# Patient Record
Sex: FEMALE | Race: BLACK OR AFRICAN AMERICAN | NOT HISPANIC OR LATINO | URBAN - METROPOLITAN AREA
[De-identification: names, ages, dates, MRNs, and addresses within clinical notes are randomized per-mention and may not be internally consistent; named-entity substitution may affect disease eponyms.]

---

## 2017-10-05 ENCOUNTER — OUTPATIENT (OUTPATIENT)
Dept: OUTPATIENT SERVICES | Facility: HOSPITAL | Age: 22
LOS: 1 days | Discharge: HOME | End: 2017-10-05
Payer: MEDICAID

## 2017-10-05 DIAGNOSIS — F20.9 SCHIZOPHRENIA, UNSPECIFIED: ICD-10-CM

## 2019-04-29 ENCOUNTER — EMERGENCY (EMERGENCY)
Facility: HOSPITAL | Age: 24
LOS: 1 days | Discharge: HOME | End: 2019-04-29
Admitting: EMERGENCY MEDICINE
Payer: MEDICAID

## 2019-04-29 VITALS
DIASTOLIC BLOOD PRESSURE: 68 MMHG | HEART RATE: 107 BPM | TEMPERATURE: 98 F | SYSTOLIC BLOOD PRESSURE: 126 MMHG | RESPIRATION RATE: 16 BRPM | OXYGEN SATURATION: 100 %

## 2019-04-29 PROCEDURE — 73562 X-RAY EXAM OF KNEE 3: CPT | Mod: 26,RT

## 2019-04-29 PROCEDURE — 99283 EMERGENCY DEPT VISIT LOW MDM: CPT

## 2019-04-29 RX ORDER — IBUPROFEN 200 MG
600 TABLET ORAL ONCE
Qty: 0 | Refills: 0 | Status: COMPLETED | OUTPATIENT
Start: 2019-04-29 | End: 2019-04-29

## 2019-04-29 RX ADMIN — Medication 600 MILLIGRAM(S): at 18:28

## 2019-04-29 NOTE — ED PROVIDER NOTE - NSFOLLOWUPINSTRUCTIONS_ED_ALL_ED_FT
Take motrin 600mg (3 advil) every 8 hours as needed for pain.  Avoid any strenuous activity,  Follow up with an orthopedic within 1-2 weeks.  Return to ED for any worsening pain, swelling, redness or fever.

## 2019-04-29 NOTE — ED PROVIDER NOTE - CLINICAL SUMMARY MEDICAL DECISION MAKING FREE TEXT BOX
25 yo female c acute onset of right knee pain after hearing a "pop" sound.  likely ligamentous/meniscal injury. will get xrays, nsaids, ortho follow up

## 2019-04-29 NOTE — ED PROVIDER NOTE - MUSCULOSKELETAL MINIMAL EXAM
right knee: TTP medial and lateral joint lines, FROM, no lig instability, no swelling, erythema or ecchymosis noted

## 2019-04-29 NOTE — ED ADULT TRIAGE NOTE - CHIEF COMPLAINT QUOTE
pt comes to ED for knee pain pt was walking and she felt a pop in her knee. pt states this happened before. pt was supposed to use a brace. pt VSS pt appears comfortable NAD

## 2019-05-20 ENCOUNTER — INPATIENT (INPATIENT)
Facility: HOSPITAL | Age: 24
LOS: 0 days | Discharge: HOME | DRG: 563 | End: 2019-05-21
Attending: STUDENT IN AN ORGANIZED HEALTH CARE EDUCATION/TRAINING PROGRAM | Admitting: HOSPITALIST
Payer: MEDICAID

## 2019-05-20 ENCOUNTER — APPOINTMENT (OUTPATIENT)
Dept: ORTHOPEDIC SURGERY | Facility: CLINIC | Age: 24
End: 2019-05-20

## 2019-05-20 VITALS
OXYGEN SATURATION: 96 % | HEART RATE: 114 BPM | DIASTOLIC BLOOD PRESSURE: 69 MMHG | WEIGHT: 274.92 LBS | SYSTOLIC BLOOD PRESSURE: 120 MMHG | HEIGHT: 68 IN | RESPIRATION RATE: 16 BRPM

## 2019-05-20 DIAGNOSIS — Z29.9 ENCOUNTER FOR PROPHYLACTIC MEASURES, UNSPECIFIED: ICD-10-CM

## 2019-05-20 DIAGNOSIS — R10.13 EPIGASTRIC PAIN: ICD-10-CM

## 2019-05-20 DIAGNOSIS — R09.89 OTHER SPECIFIED SYMPTOMS AND SIGNS INVOLVING THE CIRCULATORY AND RESPIRATORY SYSTEMS: ICD-10-CM

## 2019-05-20 DIAGNOSIS — F20.9 SCHIZOPHRENIA, UNSPECIFIED: ICD-10-CM

## 2019-05-20 DIAGNOSIS — M25.561 PAIN IN RIGHT KNEE: ICD-10-CM

## 2019-05-20 DIAGNOSIS — R26.2 DIFFICULTY IN WALKING, NOT ELSEWHERE CLASSIFIED: ICD-10-CM

## 2019-05-20 LAB
ALBUMIN SERPL ELPH-MCNC: 4.2 G/DL — SIGNIFICANT CHANGE UP (ref 3.3–5)
ALP SERPL-CCNC: 119 U/L — SIGNIFICANT CHANGE UP (ref 40–120)
ALT FLD-CCNC: 28 U/L — SIGNIFICANT CHANGE UP (ref 10–45)
ANION GAP SERPL CALC-SCNC: 15 MMOL/L — SIGNIFICANT CHANGE UP (ref 5–17)
AST SERPL-CCNC: 17 U/L — SIGNIFICANT CHANGE UP (ref 10–40)
BASOPHILS # BLD AUTO: 0 K/UL — SIGNIFICANT CHANGE UP (ref 0–0.2)
BASOPHILS NFR BLD AUTO: 0.4 % — SIGNIFICANT CHANGE UP (ref 0–2)
BILIRUB SERPL-MCNC: 0.4 MG/DL — SIGNIFICANT CHANGE UP (ref 0.2–1.2)
BUN SERPL-MCNC: 11 MG/DL — SIGNIFICANT CHANGE UP (ref 7–23)
CALCIUM SERPL-MCNC: 9.7 MG/DL — SIGNIFICANT CHANGE UP (ref 8.4–10.5)
CHLORIDE SERPL-SCNC: 103 MMOL/L — SIGNIFICANT CHANGE UP (ref 96–108)
CO2 SERPL-SCNC: 21 MMOL/L — LOW (ref 22–31)
CREAT SERPL-MCNC: 0.75 MG/DL — SIGNIFICANT CHANGE UP (ref 0.5–1.3)
EOSINOPHIL # BLD AUTO: 0.1 K/UL — SIGNIFICANT CHANGE UP (ref 0–0.5)
EOSINOPHIL NFR BLD AUTO: 1.1 % — SIGNIFICANT CHANGE UP (ref 0–6)
GAS PNL BLDV: SIGNIFICANT CHANGE UP
GLUCOSE SERPL-MCNC: 111 MG/DL — HIGH (ref 70–99)
HCG SERPL-ACNC: <2 MIU/ML — SIGNIFICANT CHANGE UP
HCT VFR BLD CALC: 42.1 % — SIGNIFICANT CHANGE UP (ref 34.5–45)
HGB BLD-MCNC: 13.4 G/DL — SIGNIFICANT CHANGE UP (ref 11.5–15.5)
LYMPHOCYTES # BLD AUTO: 24.5 % — SIGNIFICANT CHANGE UP (ref 13–44)
LYMPHOCYTES # BLD AUTO: 3.1 K/UL — SIGNIFICANT CHANGE UP (ref 1–3.3)
MCHC RBC-ENTMCNC: 30.1 PG — SIGNIFICANT CHANGE UP (ref 27–34)
MCHC RBC-ENTMCNC: 31.7 GM/DL — LOW (ref 32–36)
MCV RBC AUTO: 94.9 FL — SIGNIFICANT CHANGE UP (ref 80–100)
MONOCYTES # BLD AUTO: 0.9 K/UL — SIGNIFICANT CHANGE UP (ref 0–0.9)
MONOCYTES NFR BLD AUTO: 6.7 % — SIGNIFICANT CHANGE UP (ref 2–14)
NEUTROPHILS # BLD AUTO: 8.6 K/UL — HIGH (ref 1.8–7.4)
NEUTROPHILS NFR BLD AUTO: 67.3 % — SIGNIFICANT CHANGE UP (ref 43–77)
PLATELET # BLD AUTO: 433 K/UL — HIGH (ref 150–400)
POTASSIUM SERPL-MCNC: 3.7 MMOL/L — SIGNIFICANT CHANGE UP (ref 3.5–5.3)
POTASSIUM SERPL-SCNC: 3.7 MMOL/L — SIGNIFICANT CHANGE UP (ref 3.5–5.3)
PROT SERPL-MCNC: 8.3 G/DL — SIGNIFICANT CHANGE UP (ref 6–8.3)
RBC # BLD: 4.44 M/UL — SIGNIFICANT CHANGE UP (ref 3.8–5.2)
RBC # FLD: 13.4 % — SIGNIFICANT CHANGE UP (ref 10.3–14.5)
SODIUM SERPL-SCNC: 139 MMOL/L — SIGNIFICANT CHANGE UP (ref 135–145)
WBC # BLD: 12.7 K/UL — HIGH (ref 3.8–10.5)
WBC # FLD AUTO: 12.7 K/UL — HIGH (ref 3.8–10.5)

## 2019-05-20 PROCEDURE — 99223 1ST HOSP IP/OBS HIGH 75: CPT

## 2019-05-20 PROCEDURE — 73562 X-RAY EXAM OF KNEE 3: CPT | Mod: 26,RT

## 2019-05-20 PROCEDURE — 99285 EMERGENCY DEPT VISIT HI MDM: CPT

## 2019-05-20 PROCEDURE — 93971 EXTREMITY STUDY: CPT | Mod: 26

## 2019-05-20 RX ORDER — HALOPERIDOL DECANOATE 100 MG/ML
10 INJECTION INTRAMUSCULAR AT BEDTIME
Refills: 0 | Status: DISCONTINUED | OUTPATIENT
Start: 2019-05-20 | End: 2019-05-21

## 2019-05-20 RX ORDER — FAMOTIDINE 10 MG/ML
20 INJECTION INTRAVENOUS ONCE
Refills: 0 | Status: DISCONTINUED | OUTPATIENT
Start: 2019-05-20 | End: 2019-05-20

## 2019-05-20 RX ORDER — ACETAMINOPHEN 500 MG
975 TABLET ORAL ONCE
Refills: 0 | Status: COMPLETED | OUTPATIENT
Start: 2019-05-20 | End: 2019-05-20

## 2019-05-20 RX ORDER — IBUPROFEN 200 MG
600 TABLET ORAL ONCE
Refills: 0 | Status: COMPLETED | OUTPATIENT
Start: 2019-05-20 | End: 2019-05-20

## 2019-05-20 RX ORDER — ACETAMINOPHEN 500 MG
650 TABLET ORAL EVERY 6 HOURS
Refills: 0 | Status: DISCONTINUED | OUTPATIENT
Start: 2019-05-20 | End: 2019-05-21

## 2019-05-20 RX ORDER — SODIUM CHLORIDE 9 MG/ML
1000 INJECTION INTRAMUSCULAR; INTRAVENOUS; SUBCUTANEOUS ONCE
Refills: 0 | Status: COMPLETED | OUTPATIENT
Start: 2019-05-20 | End: 2019-05-20

## 2019-05-20 RX ORDER — ENOXAPARIN SODIUM 100 MG/ML
40 INJECTION SUBCUTANEOUS EVERY 24 HOURS
Refills: 0 | Status: DISCONTINUED | OUTPATIENT
Start: 2019-05-20 | End: 2019-05-21

## 2019-05-20 RX ORDER — FAMOTIDINE 10 MG/ML
20 INJECTION INTRAVENOUS ONCE
Refills: 0 | Status: DISCONTINUED | OUTPATIENT
Start: 2019-05-20 | End: 2019-05-21

## 2019-05-20 RX ORDER — POLYETHYLENE GLYCOL 3350 17 G/17G
17 POWDER, FOR SOLUTION ORAL DAILY
Refills: 0 | Status: DISCONTINUED | OUTPATIENT
Start: 2019-05-20 | End: 2019-05-21

## 2019-05-20 RX ORDER — OXYCODONE HYDROCHLORIDE 5 MG/1
5 TABLET ORAL EVERY 6 HOURS
Refills: 0 | Status: DISCONTINUED | OUTPATIENT
Start: 2019-05-20 | End: 2019-05-21

## 2019-05-20 RX ORDER — HALOPERIDOL DECANOATE 100 MG/ML
5 INJECTION INTRAMUSCULAR DAILY
Refills: 0 | Status: DISCONTINUED | OUTPATIENT
Start: 2019-05-20 | End: 2019-05-21

## 2019-05-20 RX ORDER — PANTOPRAZOLE SODIUM 20 MG/1
40 TABLET, DELAYED RELEASE ORAL
Refills: 0 | Status: DISCONTINUED | OUTPATIENT
Start: 2019-05-20 | End: 2019-05-21

## 2019-05-20 RX ADMIN — Medication 600 MILLIGRAM(S): at 20:51

## 2019-05-20 RX ADMIN — Medication 600 MILLIGRAM(S): at 17:54

## 2019-05-20 RX ADMIN — Medication 975 MILLIGRAM(S): at 17:32

## 2019-05-20 RX ADMIN — Medication 975 MILLIGRAM(S): at 20:51

## 2019-05-20 NOTE — ED PROVIDER NOTE - NS_ ATTENDINGSCRIBEDETAILS _ED_A_ED_FT
I performed a history and physical exam of the patient and discussed their management with the resident. I reviewed the scribe's note and agree with the documented findings and plan of care.  Slime Cruz MD

## 2019-05-20 NOTE — ED PROVIDER NOTE - SKIN, MLM
Skin normal color for race, warm, dry and intact. No evidence of rash.  no erythema of right knee but some warmth

## 2019-05-20 NOTE — ED PROVIDER NOTE - CLINICAL SUMMARY MEDICAL DECISION MAKING FREE TEXT BOX
23 yo female with unremarkable pmhx presenting with inability to walk for 3 weeks. Suggestive of pain 2/2 prior dislocation. Will get xray, cbc, cmp, will give ivf and will reassess after tylenol

## 2019-05-20 NOTE — H&P ADULT - NSHPPHYSICALEXAM_GEN_ALL_CORE
Vital Signs Last 24 Hrs  T(C): 37.2 (05-20-19 @ 17:55), Max: 37.2 (05-20-19 @ 17:55)  T(F): 98.9 (05-20-19 @ 17:55), Max: 98.9 (05-20-19 @ 17:55)  HR: 116 (05-20-19 @ 17:55) (114 - 116)  BP: 131/84 (05-20-19 @ 17:55) (120/69 - 131/84)  BP(mean): --  RR: 17 (05-20-19 @ 17:55) (16 - 17)  SpO2: 95% (05-20-19 @ 17:55) (95% - 96%)

## 2019-05-20 NOTE — CONSULT NOTE ADULT - SUBJECTIVE AND OBJECTIVE BOX
24y Female presents to [facility] ED s/p [facility] fall 1 week ago c/o worsening R pain and inability to ambulate. Here with mother. Was seen at an outside ER - told she may have dislocated patella - did not require formal reduction. States she twisted her knee and fall to the ground. Initially had pain that resolved over 24 hrs, then 2-3 days later started having worsening pain. Her pain has slowly worsened over the past 4-5 days. No new injuries. Cannot localize her pain. Denies any mechanical symptoms. Does get some swelling. No paresthesias. No back or hip pain. No radiating pain. Patient denies headstrike or LOC. Patient denies numbness/tingling/burning in the RLE. No other bone/joint complaints. No history of knee pain. No fevers or chills.     PAST MEDICAL & SURGICAL HISTORY:  Autism  Schizo-affective psychosis  Schizophrenia  Autism  No significant past surgical history    MEDICATIONS  (STANDING):  enoxaparin Injectable 40 milliGRAM(s) SubCutaneous every 24 hours  haloperidol     Tablet 5 milliGRAM(s) Oral daily  haloperidol     Tablet 10 milliGRAM(s) Oral at bedtime  pantoprazole    Tablet 40 milliGRAM(s) Oral before breakfast  polyethylene glycol 3350 17 Gram(s) Oral daily  sodium chloride 0.9% Bolus 1000 milliLiter(s) IV Bolus once    MEDICATIONS  (PRN):  acetaminophen   Tablet .. 650 milliGRAM(s) Oral every 6 hours PRN Mild Pain (1 - 3), Moderate Pain (4 - 6)  famotidine Injectable 20 milliGRAM(s) IV Push once PRN epigastric burning  oxyCODONE    IR 5 milliGRAM(s) Oral every 6 hours PRN Severe Pain (7 - 10)    Allergies    No Known Allergies    Intolerances        T(C): 36.7 (05-20-19 @ 22:17), Max: 37.2 (05-20-19 @ 17:55)  HR: 98 (05-20-19 @ 22:17) (98 - 116)  BP: 132/85 (05-20-19 @ 22:17) (120/69 - 132/85)  RR: 16 (05-20-19 @ 22:17) (16 - 17)  SpO2: 96% (05-20-19 @ 22:17) (95% - 96%)  Wt(kg): --    PE   RLE:  Skin intact; large leg, difficult to determine any sizeable effusion.   Diffuse TTP.   No deformity.   No quad defect.   full PROM 0-110, pain throughout arc of motion.   Refuses active ROM  No clinical sign of patellar dislocation - appears well tracking. No apprehension. 1+ medial and lateral glide.   Stable collaterals.   Compartments soft;  No TTP to hip/leg/ankle/foot   Able to SLR; - Log Roll/Heel Strike  Motor intact GS/TA/FHL/EHL  SILT L2-S1  DP/PT pulses 2+    LLE/BUE:   No bony TTP; Good ROM w/o pain; Exam Unremarkable    Secondary Survey: No TTP over bony prominences, SILT, palpable pulses, full/painless range of motion, compartments soft      Imaging:  XR demonstrating no fracture. No sunrise view available.     24F with R knee sprain    - Xrays unremarkable.   - History and exam somewhat limited to due to autism/psych hx/baseline mental status.   - Could have dislocated patella however inconsistent exam.   - Would obtain sunrise view.   - Treat as initial patellar dislocator - WBAT in KI locked in extension.   - Would benefit from repeat exam after acute phase to determine if continued ROM restriction required.   - Pain control  - rest, ice, nsaids.   - No acute surgical intervention.   - FU as an outpatient in 7-10 days.   - Will sign off.   - Will DW attending.

## 2019-05-20 NOTE — ED PROVIDER NOTE - PROGRESS NOTE DETAILS
Pawan Lorenz PGY1  Spoke to social work about getting walker for home for right knee pain 2/2 h/o patellar dislocation. SW will evaluate patient. Pawan Lorenz PGY1  Attempted to walk patient with knee immobilizer with walker, patient failed ambulation 2/2 instability and weakness. SW recommended PT eval and possible rehab placement. Will get basic labs for admission. Pawan Lorenz PGY1  Admitted to hospitalist for rehab eval. ortho will come to evaluate patient

## 2019-05-20 NOTE — ED ADULT NURSE NOTE - OBJECTIVE STATEMENT
25 y/o female presents to ed c/o right knee pain. States she hurt her knee 3 weeks ago and was seen at [facility] and told to f/up with ortho. Has not been able to make an appointment. Took Motrin yesterday with slight relief. Denies chest pain, sob, ha, n/v/d, abdominal pain, f/c, urinary symptoms, hematuria. A&Ox4, vss, skin warm dry and intact, MAEx4, lungs CTA, abd soft obese and nontender. Pt resting comfortably with VSS, no complaints at this time. Patient's bed in the lowest position, explained plan of care to patient and family members. Will continue to reassess.

## 2019-05-20 NOTE — H&P ADULT - PROBLEM SELECTOR PLAN 1
Given degree of symptoms will likely need further evaluation. Work up as per orthopedic team.  -F/u ortho recommendations  -Tylenol and oxycodone PRN pain  -miralax  -RLE Doppler

## 2019-05-20 NOTE — CHART NOTE - NSCHARTNOTEFT_GEN_A_CORE
EMERGENCY ROOM SOCIAL WORK: CALI consulted for DME referral. Chart reviewed. Patient is a 23 y/o, female, with PMH of Autism, Schizophrenia, presents to the ED c/o knee pain. Per treating MD, plan is to discharge patient with walker for assist with ambulation. Walker presented at bedside for assessment, patient unable to safety ambulate with walker. Patient reports pain and unsteady balance. Patient attempted ambulation with straight cane, patient unable to support self. Patient resides with mother on a 2nd floor apartment in [place], [place] (1 flight of stairs to navigate). Mother, Priyanka (ph. [phone]) identified as emergency contact. Mother reports patient has been unable to leave home for medical appointments and unable to perform ADLs as a result of dislocation. Disposition pending further medical evaluation.

## 2019-05-20 NOTE — ED PROVIDER NOTE - INTERPRETATION
Possible left atrial enlargement. Left ventricular hypertrophy. Nonspecific T wave abnormality./abnormal

## 2019-05-20 NOTE — H&P ADULT - ATTENDING COMMENTS
I was asked to see this patient by the hospitalist in charge overnight. Day hospitalist to assume care in AM and thereafter.

## 2019-05-20 NOTE — ED PROVIDER NOTE - OBJECTIVE STATEMENT
25 y/o f with no PMHX knee pain s/p twisted her knee when falling 3 weeks ago. Fell in the bathroom onto the right knee and was unable to walk. Went to [facility] after fall they got X-ray and Ibuprofen but it was normal. Pt's mom states that the knee has continued to swell and pain. This happened a year ago but resolved on its own.

## 2019-05-20 NOTE — H&P ADULT - HISTORY OF PRESENT ILLNESS
24F w/ schizoaffective disorder on haldol, recent R patella dislocation? p/w worsening R knee pain and inability to ambulate or perform ADLs. Roughly 3 weeks ago pt was on going to sit on the toilet when she felt a large pop and experienced severe R knee pain which made her unable to ambulate. She was taken to ER where she had x-ray performed and discharged with ortho follow up. Pt was unable to follow up given severe pain and inability to ambulate there. She has been taking ibuprofen and tylenol at home for pain with minimal effect. Pain has not improved or possibly worsened. Pt needs help ADLs. Also endorses some epigastric burning as well. Mother concerned she will need surgery and brought her back to ER today. Pt had similar incident happen to contralateral knee several years ago. Denies trauma.    In ER: Given NS 1L, Tylenol 975mg IV x2, ibuprofen 600mg PO

## 2019-05-20 NOTE — ED PROVIDER NOTE - MUSCULOSKELETAL, MLM
Spine appears normal, range of motion is not limited, no muscle or joint tenderness. limited ROM on right knee secondary to pain, tenderness to palpation right anterior patella.

## 2019-05-20 NOTE — H&P ADULT - ASSESSMENT
24F w/ schizoaffective disorder on haldol, recent R patella dislocation? p/w worsening R knee pain and inability to ambulate or perform ADLs

## 2019-05-20 NOTE — ED PROVIDER NOTE - ATTENDING CONTRIBUTION TO CARE
I performed a history and physical exam of the patient and discussed their management with the resident. I reviewed the resident's note and agree with the documented findings and plan of care.  Slime Cruz MD

## 2019-05-21 ENCOUNTER — TRANSCRIPTION ENCOUNTER (OUTPATIENT)
Age: 24
End: 2019-05-21

## 2019-05-21 VITALS
RESPIRATION RATE: 18 BRPM | TEMPERATURE: 98 F | DIASTOLIC BLOOD PRESSURE: 74 MMHG | HEART RATE: 102 BPM | SYSTOLIC BLOOD PRESSURE: 108 MMHG | OXYGEN SATURATION: 97 %

## 2019-05-21 DIAGNOSIS — E66.01 MORBID (SEVERE) OBESITY DUE TO EXCESS CALORIES: ICD-10-CM

## 2019-05-21 DIAGNOSIS — S83.004A UNSPECIFIED DISLOCATION OF RIGHT PATELLA, INITIAL ENCOUNTER: ICD-10-CM

## 2019-05-21 LAB
ANION GAP SERPL CALC-SCNC: 15 MMOL/L — SIGNIFICANT CHANGE UP (ref 5–17)
APTT BLD: 34.2 SEC — SIGNIFICANT CHANGE UP (ref 27.5–36.3)
BASOPHILS # BLD AUTO: 0.04 K/UL — SIGNIFICANT CHANGE UP (ref 0–0.2)
BASOPHILS NFR BLD AUTO: 0.4 % — SIGNIFICANT CHANGE UP (ref 0–2)
BUN SERPL-MCNC: 12 MG/DL — SIGNIFICANT CHANGE UP (ref 7–23)
CALCIUM SERPL-MCNC: 9.1 MG/DL — SIGNIFICANT CHANGE UP (ref 8.4–10.5)
CHLORIDE SERPL-SCNC: 101 MMOL/L — SIGNIFICANT CHANGE UP (ref 96–108)
CO2 SERPL-SCNC: 22 MMOL/L — SIGNIFICANT CHANGE UP (ref 22–31)
CREAT SERPL-MCNC: 0.7 MG/DL — SIGNIFICANT CHANGE UP (ref 0.5–1.3)
EOSINOPHIL # BLD AUTO: 0.13 K/UL — SIGNIFICANT CHANGE UP (ref 0–0.5)
EOSINOPHIL NFR BLD AUTO: 1.4 % — SIGNIFICANT CHANGE UP (ref 0–6)
GLUCOSE SERPL-MCNC: 95 MG/DL — SIGNIFICANT CHANGE UP (ref 70–99)
HCT VFR BLD CALC: 38.5 % — SIGNIFICANT CHANGE UP (ref 34.5–45)
HGB BLD-MCNC: 12.1 G/DL — SIGNIFICANT CHANGE UP (ref 11.5–15.5)
IMM GRANULOCYTES NFR BLD AUTO: 0.3 % — SIGNIFICANT CHANGE UP (ref 0–1.5)
INR BLD: 1.3 RATIO — HIGH (ref 0.88–1.16)
LYMPHOCYTES # BLD AUTO: 3 K/UL — SIGNIFICANT CHANGE UP (ref 1–3.3)
LYMPHOCYTES # BLD AUTO: 31.5 % — SIGNIFICANT CHANGE UP (ref 13–44)
MAGNESIUM SERPL-MCNC: 2 MG/DL — SIGNIFICANT CHANGE UP (ref 1.6–2.6)
MCHC RBC-ENTMCNC: 29.7 PG — SIGNIFICANT CHANGE UP (ref 27–34)
MCHC RBC-ENTMCNC: 31.4 GM/DL — LOW (ref 32–36)
MCV RBC AUTO: 94.4 FL — SIGNIFICANT CHANGE UP (ref 80–100)
MONOCYTES # BLD AUTO: 0.67 K/UL — SIGNIFICANT CHANGE UP (ref 0–0.9)
MONOCYTES NFR BLD AUTO: 7 % — SIGNIFICANT CHANGE UP (ref 2–14)
NEUTROPHILS # BLD AUTO: 5.65 K/UL — SIGNIFICANT CHANGE UP (ref 1.8–7.4)
NEUTROPHILS NFR BLD AUTO: 59.4 % — SIGNIFICANT CHANGE UP (ref 43–77)
PLATELET # BLD AUTO: 366 K/UL — SIGNIFICANT CHANGE UP (ref 150–400)
POTASSIUM SERPL-MCNC: 3.3 MMOL/L — LOW (ref 3.5–5.3)
POTASSIUM SERPL-SCNC: 3.3 MMOL/L — LOW (ref 3.5–5.3)
PROTHROM AB SERPL-ACNC: 14.8 SEC — HIGH (ref 10–13.1)
RBC # BLD: 4.08 M/UL — SIGNIFICANT CHANGE UP (ref 3.8–5.2)
RBC # FLD: 14.7 % — HIGH (ref 10.3–14.5)
SODIUM SERPL-SCNC: 138 MMOL/L — SIGNIFICANT CHANGE UP (ref 135–145)
WBC # BLD: 9.52 K/UL — SIGNIFICANT CHANGE UP (ref 3.8–10.5)
WBC # FLD AUTO: 9.52 K/UL — SIGNIFICANT CHANGE UP (ref 3.8–10.5)

## 2019-05-21 PROCEDURE — 99239 HOSP IP/OBS DSCHRG MGMT >30: CPT

## 2019-05-21 RX ORDER — ACETAMINOPHEN 500 MG
2 TABLET ORAL
Qty: 0 | Refills: 0 | DISCHARGE
Start: 2019-05-21

## 2019-05-21 RX ORDER — FAMOTIDINE 10 MG/ML
1 INJECTION INTRAVENOUS
Qty: 15 | Refills: 0
Start: 2019-05-21 | End: 2019-06-04

## 2019-05-21 RX ORDER — POTASSIUM CHLORIDE 20 MEQ
40 PACKET (EA) ORAL ONCE
Refills: 0 | Status: COMPLETED | OUTPATIENT
Start: 2019-05-21 | End: 2019-05-21

## 2019-05-21 RX ORDER — IBUPROFEN 200 MG
2 TABLET ORAL
Qty: 45 | Refills: 0
Start: 2019-05-21 | End: 2019-06-04

## 2019-05-21 RX ADMIN — PANTOPRAZOLE SODIUM 40 MILLIGRAM(S): 20 TABLET, DELAYED RELEASE ORAL at 05:02

## 2019-05-21 RX ADMIN — HALOPERIDOL DECANOATE 5 MILLIGRAM(S): 100 INJECTION INTRAMUSCULAR at 09:02

## 2019-05-21 RX ADMIN — Medication 40 MILLIEQUIVALENT(S): at 10:26

## 2019-05-21 RX ADMIN — HALOPERIDOL DECANOATE 10 MILLIGRAM(S): 100 INJECTION INTRAMUSCULAR at 00:19

## 2019-05-21 RX ADMIN — SODIUM CHLORIDE 1000 MILLILITER(S): 9 INJECTION INTRAMUSCULAR; INTRAVENOUS; SUBCUTANEOUS at 00:19

## 2019-05-21 RX ADMIN — ENOXAPARIN SODIUM 40 MILLIGRAM(S): 100 INJECTION SUBCUTANEOUS at 05:02

## 2019-05-21 NOTE — DISCHARGE NOTE PROVIDER - CARE PROVIDER_API CALL
Glenn Sethi (MD)  Orthopaedic Surgery  [address]  [place] [address]  Phone: [phone]  Fax: [phone]  Follow Up Time:

## 2019-05-21 NOTE — PATIENT PROFILE ADULT - FLU SEASON?
02/23/18 0726   Patient Assessment/Suction   Level of Consciousness (AVPU) alert   Respiratory Effort Normal;Unlabored   Expansion/Accessory Muscles/Retractions expansion symmetric   All Lung Fields Breath Sounds clear;equal bilaterally   Rhythm/Pattern, Respiratory pattern regular   Cough Frequency infrequent   Cough Type none   PRE-TX-O2-ETCO2   O2 Device (Oxygen Therapy) nasal cannula   $ Is the patient on Low Flow Oxygen? Yes   Flow (L/min) 2          No

## 2019-05-21 NOTE — DISCHARGE NOTE PROVIDER - HOSPITAL COURSE
24F with R knee sprain        - Xrays unremarkable.     - History and exam somewhat limited to due to autism/psych hx/baseline mental status.     - Could have dislocated patella however inconsistent exam.     - Would obtain sunrise view.     - Treat as initial patellar dislocator - WBAT in KI locked in extension.     - Would benefit from repeat exam after acute phase to determine if continued ROM restriction required.     - Pain control    - rest, ice, nsaids.     - No acute surgical intervention.     - FU as an outpatient in 7-10 days.     - Will sign off.     - Will DW attending.             Electronic Signatures:    Alexey Gill ()  (Signature Pending)    	Co-Signer: Consult Note, Referral/Consultation, Subjective and Objective    Nelson Crocker)  (Signed 20-May-2019 22:52)    	Authored: Consult Note, Referral/Consultation, Subjective and Objective 24F with R knee sprain        - Xrays unremarkable.     - History and exam somewhat limited to due to autism/psych hx/baseline mental status.     - Could have dislocated patella however inconsistent exam.     - Would obtain sunrise view.     - Treat as initial patellar dislocator - WBAT in KI locked in extension.     - Would benefit from repeat exam after acute phase to determine if continued ROM restriction required.     - Pain control    - rest, ice, nsaids.     - No acute surgical intervention.     - FU as an outpatient in 7-10 days. with orthopedic     - DR enrico murrell for D/c 24F with R knee sprain        - Xrays unremarkable.     - History and exam somewhat limited to due to autism/psych hx/baseline mental status.     - Could have dislocated patella however inconsistent exam.     - Treat as initial patellar dislocator - WBAT in KI locked in extension.     - Would benefit from repeat exam after acute phase to determine if continued ROM restriction required.     - Pain control    - rest, ice, nsaids.     - No acute surgical intervention.     - FU as an outpatient in 7-10 days. with orthopedic     - DR enrico murrell for D/c

## 2019-05-21 NOTE — PROGRESS NOTE ADULT - PROBLEM SELECTOR PLAN 1
No further inpatient workup as per Ortho, will treat as patellar dislocation. -F/u ortho outpatient   -Tylenol and oxycodone PRN pain  -Needs PT evaluation   -Miralax  -RLE Doppler negative

## 2019-05-21 NOTE — PHYSICAL THERAPY INITIAL EVALUATION ADULT - PRECAUTIONS/LIMITATIONS, REHAB EVAL
Pt was unable to follow up given severe pain and inability to ambulate there. She has been taking ibuprofen and tylenol at home for pain with minimal effect. Pain has not improved or possibly worsened.  Mother concerned she will need surgery and brought her back to ER today. Pt had similar incident happen to contralateral knee several years ago. Denies trauma. XR R knee: There is no acute fracture or dislocation of the right knee. The joint  spaces are preserved. Small joint effusion

## 2019-05-21 NOTE — PHYSICAL THERAPY INITIAL EVALUATION ADULT - LEVEL OF INDEPENDENCE: SUPINE/SIT, REHAB EVAL
impression [facility] is a 14 y.o. female s/p laparoscopic cholecystectomy, 9/22/2017. She is recovering well. Labs pending. [facility] may follow up in pediatric surgery clinic as needed. It is my pleasure to be involved in Layla's surgical care. If I can be of further assistance please do not hesitate to contact our office. Respectfully,  Selvin Friedman MD   I have seen and examined patient. I have read the residents note above and agree with plan. moderate assist (50% patients effort)

## 2019-05-21 NOTE — PROGRESS NOTE ADULT - SUBJECTIVE AND OBJECTIVE BOX
Patient is a 24y old  Female who presents with a chief complaint of R knee pain and inability to ambulate (20 May 2019 22:44)      INTERVAL HPI/OVERNIGHT EVENTS:  No acute overnight events.          Review of Systems: 12 point review of systems otherwise negative  ( - )fevers/chills  ( - ) dyspnea  ( - ) cough  ( - ) chest pain  ( - ) palpitations  ( - ) dizziness/lightheadedness  ( - ) nausea/vomiting  ( - ) abd pain  ( - ) diarrhea  ( - ) melena  ( - ) hematochezia  ( - ) dysuria  ( - ) hematuria  ( - ) leg swelling  ( -) calf tenderness  ( - ) motor weakness  ( - ) extremity numbness  ( - ) back pain  ( + ) tolerating POs  ( + ) BM    MEDICATIONS  (STANDING):  enoxaparin Injectable 40 milliGRAM(s) SubCutaneous every 24 hours  haloperidol     Tablet 5 milliGRAM(s) Oral daily  haloperidol     Tablet 10 milliGRAM(s) Oral at bedtime  pantoprazole    Tablet 40 milliGRAM(s) Oral before breakfast  polyethylene glycol 3350 17 Gram(s) Oral daily    MEDICATIONS  (PRN):  acetaminophen   Tablet .. 650 milliGRAM(s) Oral every 6 hours PRN Mild Pain (1 - 3), Moderate Pain (4 - 6)  famotidine Injectable 20 milliGRAM(s) IV Push once PRN epigastric burning  oxyCODONE    IR 5 milliGRAM(s) Oral every 6 hours PRN Severe Pain (7 - 10)      Allergies    No Known Allergies    Intolerances          Vital Signs Last 24 Hrs  T(C): 36.8 (21 May 2019 07:56), Max: 37.2 (20 May 2019 17:55)  T(F): 98.2 (21 May 2019 07:56), Max: 98.9 (20 May 2019 17:55)  HR: 111 (21 May 2019 07:56) (98 - 116)  BP: 116/76 (21 May 2019 07:56) (103/74 - 132/85)  BP(mean): --  RR: 18 (21 May 2019 07:56) (16 - 18)  SpO2: 94% (21 May 2019 07:56) (94% - 97%)  CAPILLARY BLOOD GLUCOSE            Physical Exam:    Daily Height in cm: 172.72 (20 May 2019 15:37)      General:  NAD  HEENT:  Nonicteric, PERRLA  CV:  RRR, no murmur  Lungs:  CTA B/L, no wheezes, rales, rhonchi  Abdomen:  Soft, non-tender, no distended, positive BS,  Extremities: R knee mildly swollen    Skin:  Warm and dry, no rashes  :  No monterroso  Neuro:  AAOx3, non-focal  No Restraints    LABS:                        12.1   9.52  )-----------( 366      ( 21 May 2019 09:18 )             38.5     05-21    138  |  101  |  12  ----------------------------<  95  3.3<L>   |  22  |  0.70    Ca    9.1      21 May 2019 05:27  Mg     2.0     05-21    TPro  8.3  /  Alb  4.2  /  TBili  0.4  /  DBili  x   /  AST  17  /  ALT  28  /  AlkPhos  119  05-20    PT/INR - ( 21 May 2019 09:16 )   PT: 14.8 sec;   INR: 1.30 ratio         PTT - ( 21 May 2019 09:16 )  PTT:34.2 sec        RADIOLOGY & ADDITIONAL TESTS:    ---------------------------------------------------------------------------  I personally reviewed: [  ]EKG   [  ]CXR    [  ] CT    [  ]Other  ---------------------------------------------------------------------------  PLEASE CHECK WHEN PRESENT:     [  ]Heart Failure     [  ] Acute     [  ] Acute on Chronic     [  ] Chronic  -------------------------------------------------------------------     [  ]Diastolic [HFpEF]     [  ]Systolic [HFrEF]     [  ]Combined [HFpEF & HFrEF]     [  ]Other:  -------------------------------------------------------------------  [  ]SANDRA     [  ]ATN     [  ]Reneal Medullary Necrosis     [  ]Renal Cortical Necrosis     [  ]Other Pathological Lesions:    [  ]CKD 1  [  ]CKD 2  [  ]CKD 3  [  ]CKD 4  [  ]CKD 5  [  ]Other  -------------------------------------------------------------------  [  ]Other/Unspecified:    --------------------------------------------------------------------    Abdominal Nutritional Status  [  ]Malnutrition: See Nutrition Note  [  ]Cachexia  [  ]Other:   [  ]Supplement Ordered:  [  ]Morbid Obesity (BMI >=40]

## 2019-05-21 NOTE — DISCHARGE NOTE PROVIDER - NSDCCPCAREPLAN_GEN_ALL_CORE_FT
PRINCIPAL DISCHARGE DIAGNOSIS  Diagnosis: Inability to walk  Assessment and Plan of Treatment: rt knee PAIN RESOLVED

## 2019-05-21 NOTE — PHYSICAL THERAPY INITIAL EVALUATION ADULT - PERTINENT HX OF CURRENT PROBLEM, REHAB EVAL
Pt is a  25 y/o female admitted to [facility] on 5/20/19 w/ schizoaffective disorder on haldol, recent R patella dislocation? p/w worsening R knee pain and inability to ambulate or perform ADLs. Roughly 3 weeks ago pt was on going to sit on the toilet when she felt a large pop and experienced severe R knee pain which made her unable to ambulate. She was taken to ER where she had x-ray performed and discharged with ortho follow up.

## 2019-05-21 NOTE — DISCHARGE NOTE NURSING/CASE MANAGEMENT/SOCIAL WORK - NSDCDPATPORTLINK_GEN_ALL_CORE
You can access the [facility] Patient Portal, offered by [facility], by registering with the following website: [email or web address]

## 2019-05-21 NOTE — PHYSICAL THERAPY INITIAL EVALUATION ADULT - PLANNED THERAPY INTERVENTIONS, PT EVAL
stair negotiation: GOAL: Pt will be able to negotiate 10 steps +HR independently with reciprocal pattern in 2 weeks./gait training/bed mobility training/transfer training

## 2019-05-21 NOTE — PHYSICAL THERAPY INITIAL EVALUATION ADULT - BED MOBILITY LIMITATIONS, REHAB EVAL
Recent PHQ 2/9 Score    PHQ 2:  Date PHQ 2 Score   10/10/2017 0       PHQ 9:            decreased ability to use legs for bridging/pushing

## 2019-05-21 NOTE — PHYSICAL THERAPY INITIAL EVALUATION ADULT - GAIT DEVIATIONS NOTED, PT EVAL
decreased step length/decreased weight-shifting ability/decreased dorita/decreased velocity of limb motion

## 2019-05-22 ENCOUNTER — INBOUND DOCUMENT (OUTPATIENT)
Age: 24
End: 2019-05-22

## 2019-05-25 ENCOUNTER — INPATIENT (INPATIENT)
Facility: HOSPITAL | Age: 24
LOS: 3 days | Discharge: HOME | DRG: 563 | End: 2019-05-29
Attending: HOSPITALIST | Admitting: INTERNAL MEDICINE
Payer: MEDICAID

## 2019-05-25 VITALS
RESPIRATION RATE: 18 BRPM | OXYGEN SATURATION: 96 % | WEIGHT: 270.07 LBS | DIASTOLIC BLOOD PRESSURE: 86 MMHG | HEART RATE: 109 BPM | SYSTOLIC BLOOD PRESSURE: 123 MMHG | TEMPERATURE: 99 F

## 2019-05-25 DIAGNOSIS — M25.561 PAIN IN RIGHT KNEE: ICD-10-CM

## 2019-05-25 LAB
ALBUMIN SERPL ELPH-MCNC: 4 G/DL — SIGNIFICANT CHANGE UP (ref 3.3–5)
ALP SERPL-CCNC: 90 U/L — SIGNIFICANT CHANGE UP (ref 40–120)
ALT FLD-CCNC: 26 U/L — SIGNIFICANT CHANGE UP (ref 10–45)
ANION GAP SERPL CALC-SCNC: 13 MMOL/L — SIGNIFICANT CHANGE UP (ref 5–17)
AST SERPL-CCNC: 22 U/L — SIGNIFICANT CHANGE UP (ref 10–40)
BASOPHILS # BLD AUTO: 0 K/UL — SIGNIFICANT CHANGE UP (ref 0–0.2)
BASOPHILS NFR BLD AUTO: 0.4 % — SIGNIFICANT CHANGE UP (ref 0–2)
BILIRUB SERPL-MCNC: 0.2 MG/DL — SIGNIFICANT CHANGE UP (ref 0.2–1.2)
BUN SERPL-MCNC: 8 MG/DL — SIGNIFICANT CHANGE UP (ref 7–23)
CALCIUM SERPL-MCNC: 9.4 MG/DL — SIGNIFICANT CHANGE UP (ref 8.4–10.5)
CHLORIDE SERPL-SCNC: 106 MMOL/L — SIGNIFICANT CHANGE UP (ref 96–108)
CO2 SERPL-SCNC: 22 MMOL/L — SIGNIFICANT CHANGE UP (ref 22–31)
CREAT SERPL-MCNC: 0.63 MG/DL — SIGNIFICANT CHANGE UP (ref 0.5–1.3)
EOSINOPHIL # BLD AUTO: 0.2 K/UL — SIGNIFICANT CHANGE UP (ref 0–0.5)
EOSINOPHIL NFR BLD AUTO: 3.4 % — SIGNIFICANT CHANGE UP (ref 0–6)
GLUCOSE SERPL-MCNC: 101 MG/DL — HIGH (ref 70–99)
HCT VFR BLD CALC: 38.5 % — SIGNIFICANT CHANGE UP (ref 34.5–45)
HGB BLD-MCNC: 12.9 G/DL — SIGNIFICANT CHANGE UP (ref 11.5–15.5)
LYMPHOCYTES # BLD AUTO: 2.7 K/UL — SIGNIFICANT CHANGE UP (ref 1–3.3)
LYMPHOCYTES # BLD AUTO: 38.1 % — SIGNIFICANT CHANGE UP (ref 13–44)
MCHC RBC-ENTMCNC: 32 PG — SIGNIFICANT CHANGE UP (ref 27–34)
MCHC RBC-ENTMCNC: 33.4 GM/DL — SIGNIFICANT CHANGE UP (ref 32–36)
MCV RBC AUTO: 95.7 FL — SIGNIFICANT CHANGE UP (ref 80–100)
MONOCYTES # BLD AUTO: 0.6 K/UL — SIGNIFICANT CHANGE UP (ref 0–0.9)
MONOCYTES NFR BLD AUTO: 8.5 % — SIGNIFICANT CHANGE UP (ref 2–14)
NEUTROPHILS # BLD AUTO: 3.5 K/UL — SIGNIFICANT CHANGE UP (ref 1.8–7.4)
NEUTROPHILS NFR BLD AUTO: 49.6 % — SIGNIFICANT CHANGE UP (ref 43–77)
PLATELET # BLD AUTO: 411 K/UL — HIGH (ref 150–400)
POTASSIUM SERPL-MCNC: 3.8 MMOL/L — SIGNIFICANT CHANGE UP (ref 3.5–5.3)
POTASSIUM SERPL-SCNC: 3.8 MMOL/L — SIGNIFICANT CHANGE UP (ref 3.5–5.3)
PROT SERPL-MCNC: 7.5 G/DL — SIGNIFICANT CHANGE UP (ref 6–8.3)
RBC # BLD: 4.03 M/UL — SIGNIFICANT CHANGE UP (ref 3.8–5.2)
RBC # FLD: 13.7 % — SIGNIFICANT CHANGE UP (ref 10.3–14.5)
SODIUM SERPL-SCNC: 141 MMOL/L — SIGNIFICANT CHANGE UP (ref 135–145)
WBC # BLD: 7 K/UL — SIGNIFICANT CHANGE UP (ref 3.8–10.5)
WBC # FLD AUTO: 7 K/UL — SIGNIFICANT CHANGE UP (ref 3.8–10.5)

## 2019-05-25 PROCEDURE — 73564 X-RAY EXAM KNEE 4 OR MORE: CPT | Mod: 26,RT

## 2019-05-25 PROCEDURE — 99285 EMERGENCY DEPT VISIT HI MDM: CPT

## 2019-05-25 RX ORDER — ACETAMINOPHEN 500 MG
975 TABLET ORAL ONCE
Refills: 0 | Status: COMPLETED | OUTPATIENT
Start: 2019-05-25 | End: 2019-05-25

## 2019-05-25 RX ORDER — IBUPROFEN 200 MG
600 TABLET ORAL ONCE
Refills: 0 | Status: COMPLETED | OUTPATIENT
Start: 2019-05-25 | End: 2019-05-25

## 2019-05-25 RX ADMIN — Medication 975 MILLIGRAM(S): at 21:09

## 2019-05-25 NOTE — ED PROVIDER NOTE - PHYSICAL EXAMINATION
Gen: AAOx3, non-toxic  Head: NCAT  HEENT: EOMI, oral mucosa moist, normal conjunctiva  Lung: CTAB, no respiratory distress, no wheezes/rhonchi/rales B/L, speaking in full sentences  CV: RRR, no murmurs, rubs or gallops  Abd: soft, NTND, no guarding  MSK: no visible deformities, full R knee ROM, pain with ROM  Neuro: No focal sensory or motor deficits  Skin: Warm, well perfused, no rash  Psych: normal affect.   ~Cedric Castro M.D. Resident

## 2019-05-25 NOTE — ED ADULT NURSE NOTE - OBJECTIVE STATEMENT
25 y/o female PMH   c/o right knee pain. States she hurt her knee 3 weeks ago and was seen at [facility] and told to f/up with ortho. Has not been able to make an appointment. Took Motrin yesterday with slight relief. Denies chest pain, sob, ha, n/v/d, abdominal pain, f/c, urinary symptoms, hematuria. A&Ox4, vss, skin warm dry and intact, MAEx4, lungs CTA, abd soft obese and nontender. Pt resting comfortably with VSS, no complaints at this time. Patient's bed in the lowest position, explained plan of care to patient and family members. Will continue to reassess. 23 y/o female PMH autism presents to ED c/o R knee pain,  due to difficult ambulation at home x 3 weeks s/p fall. Pt had a fall 3 weeks in the bathroom, denies hitting head or LOC. Since then, she has been having difficulty walking at  home. Has since been bedbound/chairbound. Was seen in ED 4 days ago, admitted and discharged a day later. Pt's mom hoped pt could be placed in a rehab after, but they were unable to make arrangements. Pt returned due to continued difficulty to walk and R knee pain. Mom also states pt has had skin peeling/ulceration to bottom and R thigh. Pt's mom says she doesn't feel safe taking care of her at home and would like  to see them. Pt denies abdominal pain, chest pain, SOB, n/v/d. Pt A&O x 3. Skin warm, dry. Skin peeling noted to buttocks and diffuse small skin ulcerations. Abd. soft, obese, non-tender. 20G IV placed in R hand. Safety and comfort provided. Family at bedside.

## 2019-05-25 NOTE — ED ADULT NURSE NOTE - NS ED PATIENT SAFETY CONERN FT
pt's mom says she can't take care of pt appropriately alone with pt being unable to ambulate currently.

## 2019-05-25 NOTE — ED PROVIDER NOTE - OBJECTIVE STATEMENT
25 yo F PMHx schizophrenia on haldol p/w R knee pain. Pt fell about 3 weeks ago and had been to the ER (recently discharged from [facility] on 5/21 with ortho follow up in knee immobilizer). Xrays negative for fracture/dislocation, showed small joint effusion. Pt has been essentially bedbound 2/2 pain. She has not been able to ambulate on her leg. She returns to ER with mom for rehab placement. She denies weakness/numbness. Pt had outpatient appt with orthopedics but was unable to go because she couldn't get up out of bed.

## 2019-05-25 NOTE — ED PROVIDER NOTE - ATTENDING CONTRIBUTION TO CARE
Attending MD Linder:  I personally have seen and examined this patient.  Resident note reviewed and agree on plan of care and except where noted.  See HPI, PE, and MDM for details.       Patient presenting from home with acute on chronic knee pain and inability to ambulate, history of patellar dislocations, no obvious clinical dislocation on exam currently, no recurrent trauma. XR without fracture. Will admit for PT and possible placement as family unable to care for patient at home given comorbid psych illness and morbid obesity. Orthopedics was consulted from the ED and will see patient in-house.

## 2019-05-25 NOTE — ED ADULT NURSE NOTE - NSIMPLEMENTINTERV_GEN_ALL_ED
Implemented All Fall Risk Interventions:  [place] to call system. Call bell, personal items and telephone within reach. Instruct patient to call for assistance. Room bathroom lighting operational. Non-slip footwear when patient is off stretcher. Physically safe environment: no spills, clutter or unnecessary equipment. Stretcher in lowest position, wheels locked, appropriate side rails in place. Provide visual cue, wrist band, yellow gown, etc. Monitor gait and stability. Monitor for mental status changes and reorient to person, place, and time. Review medications for side effects contributing to fall risk. Reinforce activity limits and safety measures with patient and family.

## 2019-05-25 NOTE — ED PROVIDER NOTE - CLINICAL SUMMARY MEDICAL DECISION MAKING FREE TEXT BOX
25 yo F PMHx schizophrenia on haldol p/w R knee pain, full joint ROM, painful ROM, no concern for fracture or dislocation, possible ligamentous injury, pt unable to bear weight 2/2 pain, will repeat Xray imaging, pain control, discuss admission for rehab

## 2019-05-25 NOTE — ED PROVIDER NOTE - PROGRESS NOTE DETAILS
Cedric Castro M.D. Resident: Hospitalist paged for admission Cedric Castro M.D. Resident: Discussed patient with orthopedics, recommended referral to previous note with recommendations

## 2019-05-25 NOTE — ED PROVIDER NOTE - NS ED ROS FT
GENERAL: No fever or chills, EYES: no change in vision, HEENT: no trouble swallowing or speaking, CARDIAC: no chest pain, PULMONARY: no cough or SOB, GI: no abdominal pain, no nausea, no vomiting, no diarrhea or constipation, : No changes in urination, SKIN: no rashes, NEURO: no headache,  MSK: R knee pain ~Cedric Castro M.D. Resident

## 2019-05-26 ENCOUNTER — TRANSCRIPTION ENCOUNTER (OUTPATIENT)
Age: 24
End: 2019-05-26

## 2019-05-26 DIAGNOSIS — Z29.9 ENCOUNTER FOR PROPHYLACTIC MEASURES, UNSPECIFIED: ICD-10-CM

## 2019-05-26 DIAGNOSIS — R26.2 DIFFICULTY IN WALKING, NOT ELSEWHERE CLASSIFIED: ICD-10-CM

## 2019-05-26 DIAGNOSIS — M25.561 PAIN IN RIGHT KNEE: ICD-10-CM

## 2019-05-26 DIAGNOSIS — K64.9 UNSPECIFIED HEMORRHOIDS: ICD-10-CM

## 2019-05-26 DIAGNOSIS — F25.9 SCHIZOAFFECTIVE DISORDER, UNSPECIFIED: ICD-10-CM

## 2019-05-26 DIAGNOSIS — L90.9 ATROPHIC DISORDER OF SKIN, UNSPECIFIED: ICD-10-CM

## 2019-05-26 LAB — ERYTHROCYTE [SEDIMENTATION RATE] IN BLOOD: 35 MM/HR — HIGH (ref 0–15)

## 2019-05-26 PROCEDURE — 73560 X-RAY EXAM OF KNEE 1 OR 2: CPT | Mod: 26,RT

## 2019-05-26 PROCEDURE — 99223 1ST HOSP IP/OBS HIGH 75: CPT

## 2019-05-26 RX ORDER — COLCHICINE 0.6 MG
1.2 TABLET ORAL ONCE
Refills: 0 | Status: COMPLETED | OUTPATIENT
Start: 2019-05-26 | End: 2019-05-26

## 2019-05-26 RX ORDER — FAMOTIDINE 10 MG/ML
20 INJECTION INTRAVENOUS
Refills: 0 | Status: DISCONTINUED | OUTPATIENT
Start: 2019-05-26 | End: 2019-05-29

## 2019-05-26 RX ORDER — HALOPERIDOL DECANOATE 100 MG/ML
5 INJECTION INTRAMUSCULAR DAILY
Refills: 0 | Status: DISCONTINUED | OUTPATIENT
Start: 2019-05-26 | End: 2019-05-26

## 2019-05-26 RX ORDER — COLCHICINE 0.6 MG
0.6 TABLET ORAL ONCE
Refills: 0 | Status: COMPLETED | OUTPATIENT
Start: 2019-05-26 | End: 2019-05-26

## 2019-05-26 RX ORDER — ACETAMINOPHEN 500 MG
975 TABLET ORAL EVERY 8 HOURS
Refills: 0 | Status: DISCONTINUED | OUTPATIENT
Start: 2019-05-26 | End: 2019-05-29

## 2019-05-26 RX ORDER — HALOPERIDOL DECANOATE 100 MG/ML
10 INJECTION INTRAMUSCULAR AT BEDTIME
Refills: 0 | Status: DISCONTINUED | OUTPATIENT
Start: 2019-05-26 | End: 2019-05-29

## 2019-05-26 RX ORDER — POLYETHYLENE GLYCOL 3350 17 G/17G
17 POWDER, FOR SOLUTION ORAL DAILY
Refills: 0 | Status: DISCONTINUED | OUTPATIENT
Start: 2019-05-26 | End: 2019-05-28

## 2019-05-26 RX ORDER — ENOXAPARIN SODIUM 100 MG/ML
40 INJECTION SUBCUTANEOUS EVERY 24 HOURS
Refills: 0 | Status: DISCONTINUED | OUTPATIENT
Start: 2019-05-26 | End: 2019-05-29

## 2019-05-26 RX ORDER — HALOPERIDOL DECANOATE 100 MG/ML
5 INJECTION INTRAMUSCULAR ONCE
Refills: 0 | Status: COMPLETED | OUTPATIENT
Start: 2019-05-26 | End: 2019-05-26

## 2019-05-26 RX ORDER — HYDROCORTISONE 1 %
1 OINTMENT (GRAM) TOPICAL
Refills: 0 | Status: DISCONTINUED | OUTPATIENT
Start: 2019-05-26 | End: 2019-05-29

## 2019-05-26 RX ORDER — HALOPERIDOL DECANOATE 100 MG/ML
10 INJECTION INTRAMUSCULAR DAILY
Refills: 0 | Status: DISCONTINUED | OUTPATIENT
Start: 2019-05-27 | End: 2019-05-29

## 2019-05-26 RX ORDER — PANTOPRAZOLE SODIUM 20 MG/1
40 TABLET, DELAYED RELEASE ORAL
Refills: 0 | Status: DISCONTINUED | OUTPATIENT
Start: 2019-05-26 | End: 2019-05-29

## 2019-05-26 RX ORDER — OXYCODONE HYDROCHLORIDE 5 MG/1
5 TABLET ORAL EVERY 8 HOURS
Refills: 0 | Status: DISCONTINUED | OUTPATIENT
Start: 2019-05-26 | End: 2019-05-29

## 2019-05-26 RX ADMIN — HALOPERIDOL DECANOATE 5 MILLIGRAM(S): 100 INJECTION INTRAMUSCULAR at 11:11

## 2019-05-26 RX ADMIN — Medication 1.2 MILLIGRAM(S): at 17:36

## 2019-05-26 RX ADMIN — POLYETHYLENE GLYCOL 3350 17 GRAM(S): 17 POWDER, FOR SOLUTION ORAL at 15:01

## 2019-05-26 RX ADMIN — Medication 1 SUPPOSITORY(S): at 14:34

## 2019-05-26 RX ADMIN — Medication 500 MILLIGRAM(S): at 17:36

## 2019-05-26 RX ADMIN — Medication 500 MILLIGRAM(S): at 05:27

## 2019-05-26 RX ADMIN — Medication 975 MILLIGRAM(S): at 04:37

## 2019-05-26 RX ADMIN — Medication 975 MILLIGRAM(S): at 06:09

## 2019-05-26 RX ADMIN — Medication 500 MILLIGRAM(S): at 05:24

## 2019-05-26 RX ADMIN — Medication 0.6 MILLIGRAM(S): at 18:37

## 2019-05-26 RX ADMIN — OXYCODONE HYDROCHLORIDE 5 MILLIGRAM(S): 5 TABLET ORAL at 21:50

## 2019-05-26 RX ADMIN — Medication 975 MILLIGRAM(S): at 21:22

## 2019-05-26 RX ADMIN — OXYCODONE HYDROCHLORIDE 5 MILLIGRAM(S): 5 TABLET ORAL at 21:20

## 2019-05-26 RX ADMIN — ENOXAPARIN SODIUM 40 MILLIGRAM(S): 100 INJECTION SUBCUTANEOUS at 05:24

## 2019-05-26 RX ADMIN — HALOPERIDOL DECANOATE 10 MILLIGRAM(S): 100 INJECTION INTRAMUSCULAR at 21:21

## 2019-05-26 RX ADMIN — Medication 975 MILLIGRAM(S): at 21:52

## 2019-05-26 RX ADMIN — OXYCODONE HYDROCHLORIDE 5 MILLIGRAM(S): 5 TABLET ORAL at 15:01

## 2019-05-26 RX ADMIN — HALOPERIDOL DECANOATE 5 MILLIGRAM(S): 100 INJECTION INTRAMUSCULAR at 09:34

## 2019-05-26 NOTE — DISCHARGE NOTE PROVIDER - HOSPITAL COURSE
23 yo F w/PMH schizoaffective disorder, autism p/w R knee pain and inability to ambulate; PTA had fall with possible rt patellar dislocation. Pt was treated w/naproxen and oxycodone for pain; ortho re-consulted. She was started on colchicine for possible gout flare. MRI knee was performed, showing... 23 yo F with history of schizoaffective disorder and autism who presented with R knee pain and inability to ambulate. The patient had fall with a possible right patellar dislocation. She was treated with naproxen and oxycodone for pain. Ortho was consulted. She was started on colchicine for a possible gout flare. MRI knee with severe lateral subluxation of the patella without current dislocation, and moderate large joint effusion. The patient is to follow up with Orthopedic Surgery as an outpatient. She was discharged as hemodynamically stable. 25 yo F with history of schizoaffective disorder and autism who presented with R knee pain and inability to ambulate. The patient had fall with a possible right patellar dislocation. She was treated with naproxen and oxycodone for pain. Ortho was consulted.. MRI knee with severe lateral subluxation of the patella without current dislocation, and moderate large joint effusion. Ortho rec 4 weeks of immbolizer to remain on at all times, no surgical intervention. Patient was also noted to have a UTI and was treated with 2 days of ceftriaxone, 3rd day to be completed at home with keflex as Hd stable, afebrile, no leukocytosis and no sign of  upper tract intfections, mild urine cx penidng, no hx of resistant organisms. The patient is to follow up with Orthopedic Surgery as an outpatien PT rec home PT vs [facility] but patient elected for home PT. She was discharged as hemodynamically stable.

## 2019-05-26 NOTE — H&P ADULT - NSHPLABSRESULTS_GEN_ALL_CORE
Labs and imaging personally reviewed and interpreted.     LABS:                        12.9   7.0   )-----------( 411      ( 25 May 2019 21:15 )             38.5     141  |  106  |  8   ----------------------------<  101<H>  3.8   |  22  |  0.63    Ca    9.4      25 May 2019 21:15    TPro  7.5  /  Alb  4.0  /  TBili  0.2  /  DBili  x   /  AST  22  /  ALT  26  /  AlkPhos  90  05-25      RADIOLOGY & ADDITIONAL TESTS:  Imaging Personally Reviewed:    Knee Xray:  ******PRELIMINARY REPORT******        EXAM:  KNEE COMPLETE RIGHT (4 VIEWS)                        PROCEDURE DATE:  05/25/2019    INTERPRETATION:  No acute fracture or dislocation.    Consultant(s) Notes Reviewed:  Yes  Care Discussed with Consultants/Other Providers: Yes  Outpatient and prior hospitalization records reviewed: Yes

## 2019-05-26 NOTE — PROGRESS NOTE ADULT - ASSESSMENT
25 yo F w/ PMH schizoaffective disorder, autism p/w R knee pain and inability to ambulate, presumably from prior patellar dislocation.

## 2019-05-26 NOTE — PROGRESS NOTE ADULT - PROBLEM SELECTOR PLAN 1
--presumed R patella dislocation. Unclear from history if analgesic use or knee immobilizer use has been consistent. Regardless, she remains mostly bedbound and unable to perform ADLs. Mother having difficulty caring for her and assisting with transfers. Today was able to ambulate with assistance but cannot bear weight on rt LE. May also include other etiology, such as gout flare, given hx of gout in family.  -Ortho consulted by ED, follow up recommendations. Will obtain MRI  -c/w naproxen tylenol PRN. For severe pain, can use low dose oxycodone PRN.  -PT consult, likely to need [facility] admission. --presumed R patella dislocation. Unclear from history if analgesic use or knee immobilizer use has been consistent. Remains mostly bedbound and unable to perform ADLs. Mother having difficulty caring for her and assisting with transfers. Today was able to ambulate with assistance but cannot bear weight on rt LE. May also include other etiology, such as gout flare, given hx of gout in family.  -Ortho consulted by ED, follow up recommendations. Will obtain MRI  -c/w naproxen tylenol PRN. For severe pain, can use low dose oxycodone PRN.  -PT consult, likely to need [facility] admission  -trial of colchicine for possible gout flare

## 2019-05-26 NOTE — H&P ADULT - PROBLEM SELECTOR PLAN 3
--pain control. Use knee immobilizer.   --PT consult. Ortho evaluation.   --patient unable to perform ADLs at home and mother having difficulty caring for patient.

## 2019-05-26 NOTE — PROGRESS NOTE ADULT - PROBLEM SELECTOR PLAN 3
--pain control. Use knee immobilizer.   -PT consult. Ortho evaluation.   -patient unable to perform ADLs at home and mother having difficulty caring for patient. -pain control. Use knee immobilizer.   -PT consult. Ortho evaluation.   -patient unable to perform ADLs at home and mother having difficulty caring for patient.

## 2019-05-26 NOTE — PROGRESS NOTE ADULT - PROBLEM SELECTOR PLAN 5
-inconsistent use of topical treatment. Will start standing suppositories. Bowel regimen. -inconsistent use of topical treatment. C/w anusol

## 2019-05-26 NOTE — PROGRESS NOTE ADULT - SUBJECTIVE AND OBJECTIVE BOX
Contact info:  Sherrill Miguel MD  Internal Medicine, PGY3  Pager: [phone] (NS)/[phone] ([facility])    M-F 7AM-7PM: pager covered by primary day team  Mon-Sun 7PM-7AM: Night float page [phone] for teams 1-3, [phone] for teams 4, CMA, CMB  Sa-Sun 7AM-12PM: please see contact sheet in front of chart and/or Provider to RN, primary day team  Sa-Sun 12PM-7PM: Page [phone] for teams 1-4 (NS), primary team for CMA/CMB, [facility] please page covering resident    24 HOUR EVENTS/ROS:    MEDICATIONS:  enoxaparin Injectable 40 milliGRAM(s) SubCutaneous every 24 hours        acetaminophen   Tablet .. 975 milliGRAM(s) Oral every 8 hours PRN  haloperidol     Tablet 5 milliGRAM(s) Oral daily  haloperidol     Tablet 10 milliGRAM(s) Oral at bedtime  naproxen 500 milliGRAM(s) Oral two times a day  oxyCODONE    IR 5 milliGRAM(s) Oral every 8 hours PRN    famotidine    Tablet 20 milliGRAM(s) Oral two times a day PRN  pantoprazole    Tablet 40 milliGRAM(s) Oral before breakfast  polyethylene glycol 3350 17 Gram(s) Oral daily PRN      hydrocortisone hemorrhoidal Suppository 1 Suppository(s) Rectal two times a day      PHYSICAL EXAM:  T(C): 36.9 (05-26-19 @ 06:07), Max: 37 (05-25-19 @ 19:44)  HR: 98 (05-26-19 @ 06:07) (98 - 113)  BP: 108/75 (05-26-19 @ 06:07) (108/75 - 130/84)  RR: 18 (05-26-19 @ 06:07) (18 - 18)  SpO2: 95% (05-26-19 @ 06:07) (95% - 99%)  Wt(kg): --  Daily Height in cm: 162.56 (26 May 2019 06:07)    Daily   I&O's Summary    TELEMETRY:     Appearance: NAD	  HEENT:   Normal oral mucosa, PERRL, EOMI	  Lymphatic: No lymphadenopathy  Cardiovascular: Normal S1 S2, No JVD, No murmurs, No edema  Respiratory: Lungs clear to auscultation	  Neuro/psych: Grossly non-focal, CN 2-12 intact, AAOX3, mood and affect normal  Gastrointestinal:  Soft, Non-tender, + BS	  Skin: No rashes, No ecchymoses, No cyanosis	  Extremities: Normal range of motion, No clubbing, cyanosis or edema  Vascular: Peripheral pulses palpable 2+ bilaterally    LABS:	 	                        12.9   7.0   )-----------( 411      ( 25 May 2019 21:15 )             38.5     05-25    141  |  106  |  8   ----------------------------<  101<H>  3.8   |  22  |  0.63    Ca    9.4      25 May 2019 21:15    TPro  7.5  /  Alb  4.0  /  TBili  0.2  /  DBili  x   /  AST  22  /  ALT  26  /  AlkPhos  90  05-25    proBNP:   Lipid Profile:   HgA1c:   TSH:   FS: CAPILLARY BLOOD GLUCOSE        BCX/UCX:   Coags:     CARDIAC MARKERS:            	    ECG:  	  RADIOLOGY:    Consult notes reviewed:  Care discussed with providers: Contact info:  Sherrill Miguel MD  Internal Medicine, PGY3  Pager: [phone] (NS)/[phone] ([facility])    M-F 7AM-7PM: pager covered by primary day team  Mon-Sun 7PM-7AM: Night float page [phone] for teams 1-3, [phone] for teams 4, CMA, CMB  Sa-Sun 7AM-12PM: please see contact sheet in front of chart and/or Provider to RN, primary day team  Sa-Sun 12PM-7PM: Page [phone] for teams 1-4 (NS), primary team for CMA/CMB, [facility] please page covering resident    24 HOUR EVENTS/ROS: No acute interval events. Pt feels leg may be "stronger." Denies leg/knee pain at rest. Denies headaches, F/C, dizziness, syncope, CP/SOB, N/V, abdominal pain, dysuria, changes in BM, peripheral swelling, skin changes, new joint aches. Tolerating PO.    MEDICATIONS:  enoxaparin Injectable 40 milliGRAM(s) SubCutaneous every 24 hours  acetaminophen   Tablet .. 975 milliGRAM(s) Oral every 8 hours PRN  haloperidol     Tablet 5 milliGRAM(s) Oral daily  haloperidol     Tablet 10 milliGRAM(s) Oral at bedtime  naproxen 500 milliGRAM(s) Oral two times a day  oxyCODONE    IR 5 milliGRAM(s) Oral every 8 hours PRN  famotidine    Tablet 20 milliGRAM(s) Oral two times a day PRN  pantoprazole    Tablet 40 milliGRAM(s) Oral before breakfast  polyethylene glycol 3350 17 Gram(s) Oral daily PRN  hydrocortisone hemorrhoidal Suppository 1 Suppository(s) Rectal two times a day      PHYSICAL EXAM:  T(C): 36.9 (05-26-19 @ 06:07), Max: 37 (05-25-19 @ 19:44)  HR: 98 (05-26-19 @ 06:07) (98 - 113)  BP: 108/75 (05-26-19 @ 06:07) (108/75 - 130/84)  RR: 18 (05-26-19 @ 06:07) (18 - 18)  SpO2: 95% (05-26-19 @ 06:07) (95% - 99%)  Wt(kg): --  Daily Height in cm: 162.56 (26 May 2019 06:07)    Daily   I&O's Summary    Appearance: obese dark-skinned young female pt, in NAD lying on bed	  HEENT:   Normal oral mucosa, PERRL, EOMI	  Lymphatic: No lymphadenopathy  Cardiovascular: Normal S1 S2, No JVD, No murmurs, No edema  Respiratory: Lungs clear to auscultation	  Neuro/psych: Grossly non-focal, CN 2-12 intact, AAOX3, mood and affect normal  Gastrointestinal:  Soft, Non-tender, + BS	  Skin: No rashes, No ecchymoses, No cyanosis	  Extremities: pain with passive rt knee extension, mild warmth, but no effusions or fluctuance palpated  Vascular: Peripheral pulses palpable 2+ bilaterally    LABS:	 	                        12.9   7.0   )-----------( 411      ( 25 May 2019 21:15 )             38.5     05-25    141  |  106  |  8   ----------------------------<  101<H>  3.8   |  22  |  0.63    Ca    9.4      25 May 2019 21:15    TPro  7.5  /  Alb  4.0  /  TBili  0.2  /  DBili  x   /  AST  22  /  ALT  26  /  AlkPhos  90  05-25      RADIOLOGY:  rt knee xray:  FINDINGS:    No acute fracture. No dislocation. Joint spaces are maintained. No soft   tissue swelling. Unable to assess for joint effusion due to lack of a   true lateral view. No radiopaque foreign body.      IMPRESSION:  No acute fracture or dislocation.        Consult notes reviewed:  Care discussed with providers:

## 2019-05-26 NOTE — DISCHARGE NOTE PROVIDER - CARE PROVIDER_API CALL
Cathy Winn)  Orthopaedic Surgery  [address], [address]  [place] [address]  Phone: [phone]  Fax: [phone]  Follow Up Time: 1 week

## 2019-05-26 NOTE — DISCHARGE NOTE PROVIDER - NSDCCPCAREPLAN_GEN_ALL_CORE_FT
PRINCIPAL DISCHARGE DIAGNOSIS  Diagnosis: Knee pain, right  Assessment and Plan of Treatment: PRINCIPAL DISCHARGE DIAGNOSIS  Diagnosis: Knee pain, right  Assessment and Plan of Treatment: You were admitted because you injured your knee after a fall. You were found to have a patellar dislocation which was confirmed with imaging studies. You were treated with pain management and supportive care. You were seen by Orthopedic Surgery who suggested that you wear a knee immbilizer. You began to show signs of clinical improvement. Please continue to wear the knee immobilizer at all times for four weeks. There is a high risk for recurrent dislocations if this is not followed. Please follow up with Orthopedic Surgery as an outpatient.      SECONDARY DISCHARGE DIAGNOSES  Diagnosis: Hemorrhoids  Assessment and Plan of Treatment: During your admission, you were experiencing pain in you rectum due to your hemorrhoids. You were treated with supportive care, suppositories, and other medications to help with your constipation. Please continue with the Anusol and sitz baths as prescribed. Please follow up with your primary care physician on this matter.    Diagnosis: UTI (urinary tract infection)  Assessment and Plan of Treatment: During your admission, you were found to have an infection in your urinary tract. You were treated with antibiotics. Please continue with the keflex as prescribed. We will notify you of the urine culture results if abnormal.  If you begin to experience trouble with urination, burning with urination or stomach pain, please return to the hospital as soon as possible.    Diagnosis: Schizoaffective disorder  Assessment and Plan of Treatment: Please continue with your home medication. PRINCIPAL DISCHARGE DIAGNOSIS  Diagnosis: Knee pain, right  Assessment and Plan of Treatment: You were admitted because you injured your knee after a fall. You were found to have a patellar dislocation which was confirmed with imaging studies. You were treated with pain management and supportive care. You were seen by Orthopedic Surgery who suggested that you wear a knee immbilizer. You began to show signs of clinical improvement. Please continue to wear the knee immobilizer at all times for four weeks. There is a high risk for recurrent dislocations if this is not followed. Please follow up with Orthopedic Surgery as an outpatient.      SECONDARY DISCHARGE DIAGNOSES  Diagnosis: Hemorrhoids  Assessment and Plan of Treatment: During your admission, you were experiencing pain in you rectum due to your hemorrhoids. You were treated with supportive care, suppositories, and other medications to help with your constipation. Please continue with the Anusol as prescribed. You can also find sitz baths over-the-counter at your local pharmacy. Please follow up with your primary care physician on this matter.    Diagnosis: UTI (urinary tract infection)  Assessment and Plan of Treatment: During your admission, you were found to have an infection in your urinary tract. You were treated with antibiotics. Please continue with the keflex as prescribed. We will notify you of the urine culture results if abnormal.  If you begin to experience trouble with urination, burning with urination or stomach pain, please return to the hospital as soon as possible.    Diagnosis: Schizoaffective disorder  Assessment and Plan of Treatment: Please continue with your home medication. PRINCIPAL DISCHARGE DIAGNOSIS  Diagnosis: Patellar dislocation, right, initial encounter  Assessment and Plan of Treatment: You were admitted because you injured your knee after a fall. You were found to have a patellar dislocation which was confirmed with imaging studies. You were treated with pain management and supportive care. You were seen by Orthopedic Surgery who suggested that you wear a knee immbilizer for 4 weeks. You began to show signs of clinical improvement. Please continue to wear the knee immobilizer at all times for four weeks. There is a high risk for recurrent dislocations if this is not followed. Please follow up with Orthopedic Surgery as an outpatient.      SECONDARY DISCHARGE DIAGNOSES  Diagnosis: Hemorrhoids  Assessment and Plan of Treatment: During your admission, you were experiencing pain in you rectum due to your hemorrhoids. You were treated with supportive care, suppositories, and other medications to help with your constipation. Please continue with the Anusol as prescribed. You can also find sitz baths over-the-counter at your local pharmacy. Please follow up with your primary care physician on this matter.    Diagnosis: UTI (urinary tract infection)  Assessment and Plan of Treatment: During your admission, you were found to have an infection in your urinary tract. You were treated with antibiotics. Please continue with the keflex as prescribed. We will notify you of the urine culture results if abnormal.  If you begin to experience trouble with urination, burning with urination or stomach pain, fevers, back pain please return to the hospital as soon as possible.    Diagnosis: Schizoaffective disorder  Assessment and Plan of Treatment: Please continue with your home medication.

## 2019-05-26 NOTE — H&P ADULT - NSHPPHYSICALEXAM_GEN_ALL_CORE
Vital Signs Last 24 Hrs  T(C): 37 (05-25-19 @ 19:44)  T(F): 98.6 (05-25-19 @ 19:44), Max: 98.6 (05-25-19 @ 19:44)  HR: 109 (05-25-19 @ 19:44) (109 - 109)  BP: 123/86 (05-25-19 @ 19:44)  BP(mean): --  RR: 18 (05-25-19 @ 19:44) (18 - 18)  SpO2: 96% (05-25-19 @ 19:44) (96% - 96%)  Wt(kg): --    PHYSICAL EXAM:  GENERAL: NAD, well-developed, obese  HEAD:  Atraumatic, Normocephalic  EYES: EOMI, PERRLA, conjunctiva and sclera clear  NECK: Supple, No JVD  CHEST/LUNG: Clear to auscultation bilaterally; No wheeze  HEART: Regular rate and rhythm; No murmurs, rubs, or gallops  ABDOMEN: Soft, Nontender, Nondistended; Bowel sounds present  EXTREMITIES:  2+ Peripheral Pulses, No clubbing, cyanosis, or edema; no TTP over knee, has full ROM, pain with passive ROM, pt declining active ROM; no swelling  PSYCH: AAOx3  NEUROLOGY: non-focal  SKIN: spin peeling and small wounds R thigh and buttocks

## 2019-05-26 NOTE — DISCHARGE NOTE PROVIDER - NSDCFUADDINST_GEN_ALL_CORE_FT
Please use the knee immobilizer at all times for weeks total. There is a high risk for recurrent dislocations if this is not followed.

## 2019-05-26 NOTE — PROGRESS NOTE ADULT - PROBLEM SELECTOR PLAN 2
--continue with home haldol 10mg in the AM, 10mg PM -continue with home haldol 10mg in the AM, 10mg PM

## 2019-05-26 NOTE — H&P ADULT - NSHPREVIEWOFSYSTEMS_GEN_ALL_CORE
Review of Systems:   CONSTITUTIONAL: No fever, weight loss, or fatigue  EYES: No eye pain, visual disturbances, or discharge  ENMT:  No difficulty hearing, tinnitus, vertigo; No sinus or throat pain  NECK: No pain or stiffness  RESPIRATORY: No cough, wheezing, chills or hemoptysis; No shortness of breath  CARDIOVASCULAR: No chest pain, palpitations, dizziness, or leg swelling  GASTROINTESTINAL: +hemorrhoids; No abdominal or epigastric pain. No nausea, vomiting, or hematemesis; No diarrhea or constipation. No melena or hematochezia.  GENITOURINARY: No dysuria, frequency, hematuria, or incontinence  NEUROLOGICAL: No headaches, memory loss, loss of strength, numbness, or tremors  SKIN: No itching, burning, rashes, or lesions   LYMPH NODES: No enlarged glands  ENDOCRINE: No heat or cold intolerance; No hair loss  MUSCULOSKELETAL: +R knee pain, difficulty ambulating; No muscle, back, or extremity pain  PSYCHIATRIC: No depression, anxiety, mood swings, or difficulty sleeping  HEME/LYMPH: No easy bruising, or bleeding gums

## 2019-05-26 NOTE — H&P ADULT - HISTORY OF PRESENT ILLNESS
24F PMH schizoaffective disorder, autism p/w R knee pain and inability to ambulate. 24F PMH schizoaffective disorder, autism p/w R knee pain and inability to ambulate. Patient's mother Priyanka at bedside providing additional information. Patient experienced fall about 3-4 weeks ago when she was trying to sit down. Felt a large pop and experienced severe R knee pain making it nearly impossible to ambulate. At OSH at time of episode, she had normal xrays, was told she had a possible R patella dislocation, and told to follow up with ortho outpatient. Patient was unable to due to her inability to ambulate, so came to [facility] last week and was admitted for one night. Ortho recommended knee immobilizer and pain control. Was discharged home, but she again could not perform most of her ADLs and has been essentially bedbound over the past few days. Reports knee brace use has been intermittent because she feels like it "isn't working." 24F PMH schizoaffective disorder, autism p/w R knee pain and inability to ambulate. Patient's mother Priyanka at bedside providing additional information. Patient experienced fall about 3-4 weeks ago when she was trying to sit down. Felt a large pop and experienced severe R knee pain making it nearly impossible to ambulate. At OSH at time of episode, she had normal xrays, was told she had a possible R patella dislocation, and told to follow up with ortho outpatient. Patient was unable to due to her inability to ambulate, so came to [facility] last week and was admitted for one night. Ortho recommended knee immobilizer and pain control. Was discharged home, but she again could not perform most of her ADLs and has been essentially bedbound over the past few days. Reports knee brace use has been intermittent because she feels like it "isn't working." She can't elaborate what she means by this. Knee pain seems to be about the same as it was last week, not worsened or improved. She has been using ibuprofen 600mg 1-2 times daily only. Her mother reports she has noticed increased skin breakdown on the patient's buttocks as well as R thigh from being bedbound. Patient also complaining of rectal pain and hemorrhoids not responding to preparation H. Denies any rectal bleeding or constipation.

## 2019-05-26 NOTE — H&P ADULT - ASSESSMENT
24F PMH schizoaffective disorder, autism p/w R knee pain and inability to ambulate, presumably from prior patellar dislocation.

## 2019-05-27 LAB
APPEARANCE UR: ABNORMAL
BACTERIA # UR AUTO: ABNORMAL
BILIRUB UR-MCNC: NEGATIVE — SIGNIFICANT CHANGE UP
COLOR SPEC: ABNORMAL
CRP SERPL-MCNC: 1.54 MG/DL — HIGH (ref 0–0.4)
DIFF PNL FLD: ABNORMAL
EPI CELLS # UR: 17 /HPF — HIGH
GLUCOSE UR QL: NEGATIVE — SIGNIFICANT CHANGE UP
HYALINE CASTS # UR AUTO: 2 /LPF — SIGNIFICANT CHANGE UP (ref 0–2)
KETONES UR-MCNC: NEGATIVE — SIGNIFICANT CHANGE UP
LEUKOCYTE ESTERASE UR-ACNC: ABNORMAL
NITRITE UR-MCNC: NEGATIVE — SIGNIFICANT CHANGE UP
PH UR: 6.5 — SIGNIFICANT CHANGE UP (ref 5–8)
PROT UR-MCNC: ABNORMAL
RBC CASTS # UR COMP ASSIST: 995 /HPF — HIGH (ref 0–4)
SP GR SPEC: 1.02 — SIGNIFICANT CHANGE UP (ref 1.01–1.02)
UROBILINOGEN FLD QL: NEGATIVE — SIGNIFICANT CHANGE UP
WBC UR QL: 307 /HPF — HIGH (ref 0–5)

## 2019-05-27 PROCEDURE — 99232 SBSQ HOSP IP/OBS MODERATE 35: CPT | Mod: GC

## 2019-05-27 PROCEDURE — 73560 X-RAY EXAM OF KNEE 1 OR 2: CPT | Mod: 26,RT

## 2019-05-27 RX ORDER — CALAMINE AND ZINC OXIDE AND PHENOL 160; 10 MG/ML; MG/ML
1 LOTION TOPICAL DAILY
Refills: 0 | Status: DISCONTINUED | OUTPATIENT
Start: 2019-05-27 | End: 2019-05-29

## 2019-05-27 RX ORDER — OXYCODONE HYDROCHLORIDE 5 MG/1
5 TABLET ORAL ONCE
Refills: 0 | Status: DISCONTINUED | OUTPATIENT
Start: 2019-05-27 | End: 2019-05-27

## 2019-05-27 RX ORDER — PHENYLEPHRINE-SHARK LIVER OIL-MINERAL OIL-PETROLATUM RECTAL OINTMENT
1 OINTMENT (GRAM) RECTAL ONCE
Refills: 0 | Status: COMPLETED | OUTPATIENT
Start: 2019-05-27 | End: 2019-05-27

## 2019-05-27 RX ORDER — COLCHICINE 0.6 MG
0.6 TABLET ORAL DAILY
Refills: 0 | Status: COMPLETED | OUTPATIENT
Start: 2019-05-27 | End: 2019-05-28

## 2019-05-27 RX ORDER — DIPHENHYDRAMINE HCL 50 MG
25 CAPSULE ORAL ONCE
Refills: 0 | Status: COMPLETED | OUTPATIENT
Start: 2019-05-27 | End: 2019-05-27

## 2019-05-27 RX ADMIN — PHENYLEPHRINE-SHARK LIVER OIL-MINERAL OIL-PETROLATUM RECTAL OINTMENT 1 APPLICATION(S): at 17:00

## 2019-05-27 RX ADMIN — Medication 500 MILLIGRAM(S): at 17:09

## 2019-05-27 RX ADMIN — CALAMINE AND ZINC OXIDE AND PHENOL 1 APPLICATION(S): 160; 10 LOTION TOPICAL at 11:44

## 2019-05-27 RX ADMIN — HALOPERIDOL DECANOATE 10 MILLIGRAM(S): 100 INJECTION INTRAMUSCULAR at 21:39

## 2019-05-27 RX ADMIN — OXYCODONE HYDROCHLORIDE 5 MILLIGRAM(S): 5 TABLET ORAL at 10:00

## 2019-05-27 RX ADMIN — ENOXAPARIN SODIUM 40 MILLIGRAM(S): 100 INJECTION SUBCUTANEOUS at 05:22

## 2019-05-27 RX ADMIN — Medication 0.6 MILLIGRAM(S): at 17:07

## 2019-05-27 RX ADMIN — OXYCODONE HYDROCHLORIDE 5 MILLIGRAM(S): 5 TABLET ORAL at 01:40

## 2019-05-27 RX ADMIN — OXYCODONE HYDROCHLORIDE 5 MILLIGRAM(S): 5 TABLET ORAL at 11:00

## 2019-05-27 RX ADMIN — POLYETHYLENE GLYCOL 3350 17 GRAM(S): 17 POWDER, FOR SOLUTION ORAL at 16:59

## 2019-05-27 RX ADMIN — OXYCODONE HYDROCHLORIDE 5 MILLIGRAM(S): 5 TABLET ORAL at 02:10

## 2019-05-27 RX ADMIN — Medication 1 SUPPOSITORY(S): at 05:23

## 2019-05-27 RX ADMIN — HALOPERIDOL DECANOATE 10 MILLIGRAM(S): 100 INJECTION INTRAMUSCULAR at 11:45

## 2019-05-27 RX ADMIN — Medication 500 MILLIGRAM(S): at 05:23

## 2019-05-27 RX ADMIN — PANTOPRAZOLE SODIUM 40 MILLIGRAM(S): 20 TABLET, DELAYED RELEASE ORAL at 05:24

## 2019-05-27 RX ADMIN — OXYCODONE HYDROCHLORIDE 5 MILLIGRAM(S): 5 TABLET ORAL at 18:01

## 2019-05-27 RX ADMIN — Medication 1 SUPPOSITORY(S): at 17:10

## 2019-05-27 RX ADMIN — Medication 25 MILLIGRAM(S): at 01:40

## 2019-05-27 NOTE — PROGRESS NOTE ADULT - SUBJECTIVE AND OBJECTIVE BOX
Patient is a 24y old  Female who presents with a chief complaint of knee pain (27 May 2019 04:14)      SUBJECTIVE / OVERNIGHT EVENTS:  Overnight, the patient had rectal pain with itching.  She received benadryl and calamine lotion.  This morning, she had no complaints in all review of systems.    MEDICATIONS  (STANDING):  calamine Lotion 1 Application(s) Topical daily  enoxaparin Injectable 40 milliGRAM(s) SubCutaneous every 24 hours  haloperidol     Tablet 10 milliGRAM(s) Oral daily  haloperidol     Tablet 10 milliGRAM(s) Oral at bedtime  hydrocortisone hemorrhoidal Suppository 1 Suppository(s) Rectal two times a day  naproxen 500 milliGRAM(s) Oral two times a day  pantoprazole    Tablet 40 milliGRAM(s) Oral before breakfast    MEDICATIONS  (PRN):  acetaminophen   Tablet .. 975 milliGRAM(s) Oral every 8 hours PRN Mild Pain (1 - 3), Moderate Pain (4 - 6)  famotidine    Tablet 20 milliGRAM(s) Oral two times a day PRN reflux/heartburn  oxyCODONE    IR 5 milliGRAM(s) Oral every 8 hours PRN Severe Pain (7 - 10)  polyethylene glycol 3350 17 Gram(s) Oral daily PRN Constipation      Vital Signs Last 24 Hrs  T(C): 36.6 (27 May 2019 05:49), Max: 36.9 (26 May 2019 14:31)  T(F): 97.9 (27 May 2019 05:49), Max: 98.5 (26 May 2019 14:31)  HR: 88 (27 May 2019 05:49) (80 - 107)  BP: 130/84 (27 May 2019 05:49) (130/84 - 139/85)  BP(mean): --  RR: 20 (27 May 2019 05:49) (18 - 20)  SpO2: 94% (27 May 2019 05:49) (94% - 96%)  CAPILLARY BLOOD GLUCOSE        I&O's Summary    26 May 2019 07:01  -  27 May 2019 07:00  --------------------------------------------------------  IN: 850 mL / OUT: 1850 mL / NET: -1000 mL        PHYSICAL EXAM:  GENERAL: NAD, well-developed  HEAD:  Atraumatic, Normocephalic  EYES: EOMI, PERRLA, conjunctiva and sclera clear  NECK: Supple, No JVD  CHEST/LUNG: Clear to auscultation bilaterally; No wheeze  HEART: Regular rate and rhythm; No murmurs, rubs, or gallops  ABDOMEN: Soft, Nontender, Nondistended; Bowel sounds present  EXTREMITIES:  2+ Peripheral Pulses, No clubbing, cyanosis, or edema; right knee tender to palpation without erythema or swelling  PSYCH: AAOx3  NEUROLOGY: non-focal  SKIN: No rashes or lesions    LABS:                        12.9   7.0   )-----------( 411      ( 25 May 2019 21:15 )             38.5     05-25    141  |  106  |  8   ----------------------------<  101<H>  3.8   |  22  |  0.63    Ca    9.4      25 May 2019 21:15    TPro  7.5  /  Alb  4.0  /  TBili  0.2  /  DBili  x   /  AST  22  /  ALT  26  /  AlkPhos  90  05-25              RADIOLOGY & ADDITIONAL TESTS:    Imaging Personally Reviewed:    Consultant(s) Notes Reviewed:      Care Discussed with Consultants/Other Providers:

## 2019-05-27 NOTE — PROGRESS NOTE ADULT - PROBLEM SELECTOR PLAN 1
Presumed R patella dislocation. Remains mostly bedbound and unable to perform ADLs.    - Ortho is following; MRI is pending  - c/w naproxen Tylenol PRN and low dose oxycodone PRN for severe pain  - PT evaluation pending Presumed R patella dislocation. Remains mostly bedbound and unable to perform ADLs.    - Ortho is following; MRI is pending  - pain improved with trial of colchicine; will give another two doses.  - c/w naproxen Tylenol PRN and low dose oxycodone PRN for severe pain  - PT evaluation pending

## 2019-05-27 NOTE — PHYSICAL THERAPY INITIAL EVALUATION ADULT - ACTIVE RANGE OF MOTION EXAMINATION, REHAB EVAL
bilateral upper extremity Active ROM was WFL (within functional limits)/RLE in a KI/Right LE Active ROM was WFL (within functional limits)

## 2019-05-27 NOTE — PROGRESS NOTE ADULT - ASSESSMENT
23 yo F w/ PMH schizoaffective disorder, autism p/w R knee pain and inability to ambulate, presumably from prior patellar dislocation.

## 2019-05-27 NOTE — CONSULT NOTE ADULT - SUBJECTIVE AND OBJECTIVE BOX
24F PMH schizoaffective disorder, autism p/w R knee pain and inability to ambulate. Pt was previously seen by ortho on 5/20 for similar R knee pain s/p fall. It was determined at that time patient had an initial patellar dislocation. She was placed in knee immobilizer and scheduled for outpt follow up. Now pt with continuation of same pain. Patient has not been compliant with knee immobilizer. She states the pain is the same as prior. No new trauma. Denies other extremity pain. Denies nubmness/tingling/weakness to any extremities.       PAST MEDICAL & SURGICAL HISTORY:  Autism  Schizo-affective psychosis  Schizophrenia  Autism  No significant past surgical history    [] No significant past history as reviewed with the patient and family    MEDICATIONS  (STANDING):  calamine Lotion 1 Application(s) Topical daily  enoxaparin Injectable 40 milliGRAM(s) SubCutaneous every 24 hours  haloperidol     Tablet 10 milliGRAM(s) Oral daily  haloperidol     Tablet 10 milliGRAM(s) Oral at bedtime  hydrocortisone hemorrhoidal Suppository 1 Suppository(s) Rectal two times a day  naproxen 500 milliGRAM(s) Oral two times a day  pantoprazole    Tablet 40 milliGRAM(s) Oral before breakfast    MEDICATIONS  (PRN):  acetaminophen   Tablet .. 975 milliGRAM(s) Oral every 8 hours PRN Mild Pain (1 - 3), Moderate Pain (4 - 6)  famotidine    Tablet 20 milliGRAM(s) Oral two times a day PRN reflux/heartburn  oxyCODONE    IR 5 milliGRAM(s) Oral every 8 hours PRN Severe Pain (7 - 10)  polyethylene glycol 3350 17 Gram(s) Oral daily PRN Constipation    Allergies    No Known Allergies    Intolerances        Vital Signs Last 24 Hrs  T(C): 36.4 (26 May 2019 21:37), Max: 36.9 (26 May 2019 06:07)  T(F): 97.5 (26 May 2019 21:37), Max: 98.5 (26 May 2019 14:31)  HR: 80 (26 May 2019 21:37) (80 - 113)  BP: 139/85 (26 May 2019 21:37) (108/75 - 139/85)  BP(mean): --  RR: 20 (26 May 2019 21:37) (18 - 20)  SpO2: 96% (26 May 2019 21:37) (95% - 99%)    05-26 @ 07:01  -  05-27 @ 04:15  --------------------------------------------------------  IN: 0 mL / OUT: 1050 mL / NET: -1050 mL        PHYSICAL EXAM:  NAD  R knee: ttp, mild effusion  +5/5 T/GS/EHL/FHL  +SILT DP/SP/T/S/S  2+ DP  WWP                          12.9   7.0   )-----------( 411      ( 25 May 2019 21:15 )             38.5     05-25    141  |  106  |  8   ----------------------------<  101<H>  3.8   |  22  |  0.63    Ca    9.4      25 May 2019 21:15    TPro  7.5  /  Alb  4.0  /  TBili  0.2  /  DBili  x   /  AST  22  /  ALT  26  /  AlkPhos  90  05-25          IMAGING STUDIES:  XR R knee: dislocated patella    Procedure - closed reduction was preformed at bedside. XRay not available for post reduction films at time. Will follow up post reduction films.

## 2019-05-27 NOTE — PHYSICAL THERAPY INITIAL EVALUATION ADULT - CRITERIA FOR SKILLED THERAPEUTIC INTERVENTIONS
risk reduction/prevention/impairments found/functional limitations in following categories/therapy frequency/anticipated equipment needs at discharge/rehab potential/predicted duration of therapy intervention/anticipated discharge recommendation

## 2019-05-27 NOTE — PHYSICAL THERAPY INITIAL EVALUATION ADULT - RANGE OF MOTION EXAMINATION, REHAB EVAL
RLE in a KI/Right LE ROM was WFL (within functional limits)/bilateral upper extremity ROM was WFL (within functional limits)

## 2019-05-27 NOTE — PHYSICAL THERAPY INITIAL EVALUATION ADULT - ADDITIONAL COMMENTS
as per mother at bedside. pt can live on first floor of house and previous to all knee issues Pt was Ind with all ADLs and amb without AD. since knee dislocations pt has required more assistance and needed a RW.

## 2019-05-27 NOTE — PROGRESS NOTE ADULT - PROBLEM SELECTOR PLAN 3
- c/w plan as above. Use knee immobilizer.   - patient unable to perform ADLs at home and mother having difficulty caring for patient.

## 2019-05-27 NOTE — CONSULT NOTE ADULT - ASSESSMENT
24F w/ recurrent R patellar dislocation  -Follow up post reduction films  -R knee MRI  -WBAT in Knee immobilizer

## 2019-05-28 DIAGNOSIS — N39.0 URINARY TRACT INFECTION, SITE NOT SPECIFIED: ICD-10-CM

## 2019-05-28 LAB
APPEARANCE UR: ABNORMAL
BACTERIA # UR AUTO: ABNORMAL
BILIRUB UR-MCNC: NEGATIVE — SIGNIFICANT CHANGE UP
COLOR SPEC: YELLOW — SIGNIFICANT CHANGE UP
DIFF PNL FLD: ABNORMAL
EPI CELLS # UR: 0 /HPF — SIGNIFICANT CHANGE UP
GLUCOSE UR QL: NEGATIVE — SIGNIFICANT CHANGE UP
HYALINE CASTS # UR AUTO: 0 /LPF — SIGNIFICANT CHANGE UP (ref 0–2)
KETONES UR-MCNC: NEGATIVE — SIGNIFICANT CHANGE UP
LEUKOCYTE ESTERASE UR-ACNC: ABNORMAL
NITRITE UR-MCNC: NEGATIVE — SIGNIFICANT CHANGE UP
PH UR: 6.5 — SIGNIFICANT CHANGE UP (ref 5–8)
PROT UR-MCNC: ABNORMAL
RBC CASTS # UR COMP ASSIST: 10 /HPF — SIGNIFICANT CHANGE UP (ref 0–4)
SP GR SPEC: 1.01 — SIGNIFICANT CHANGE UP (ref 1.01–1.02)
UROBILINOGEN FLD QL: NEGATIVE — SIGNIFICANT CHANGE UP
WBC UR QL: >50 /HPF — SIGNIFICANT CHANGE UP (ref 0–5)

## 2019-05-28 PROCEDURE — 73721 MRI JNT OF LWR EXTRE W/O DYE: CPT | Mod: 26,RT

## 2019-05-28 PROCEDURE — 99232 SBSQ HOSP IP/OBS MODERATE 35: CPT | Mod: GC

## 2019-05-28 RX ORDER — SENNA PLUS 8.6 MG/1
2 TABLET ORAL AT BEDTIME
Refills: 0 | Status: DISCONTINUED | OUTPATIENT
Start: 2019-05-28 | End: 2019-05-29

## 2019-05-28 RX ORDER — POLYETHYLENE GLYCOL 3350 17 G/17G
17 POWDER, FOR SOLUTION ORAL DAILY
Refills: 0 | Status: DISCONTINUED | OUTPATIENT
Start: 2019-05-28 | End: 2019-05-29

## 2019-05-28 RX ORDER — CEFTRIAXONE 500 MG/1
INJECTION, POWDER, FOR SOLUTION INTRAMUSCULAR; INTRAVENOUS
Refills: 0 | Status: DISCONTINUED | OUTPATIENT
Start: 2019-05-28 | End: 2019-05-29

## 2019-05-28 RX ORDER — CEFTRIAXONE 500 MG/1
1 INJECTION, POWDER, FOR SOLUTION INTRAMUSCULAR; INTRAVENOUS ONCE
Refills: 0 | Status: COMPLETED | OUTPATIENT
Start: 2019-05-28 | End: 2019-05-28

## 2019-05-28 RX ORDER — DOCUSATE SODIUM 100 MG
100 CAPSULE ORAL THREE TIMES A DAY
Refills: 0 | Status: DISCONTINUED | OUTPATIENT
Start: 2019-05-28 | End: 2019-05-29

## 2019-05-28 RX ORDER — CEFTRIAXONE 500 MG/1
1 INJECTION, POWDER, FOR SOLUTION INTRAMUSCULAR; INTRAVENOUS EVERY 24 HOURS
Refills: 0 | Status: DISCONTINUED | OUTPATIENT
Start: 2019-05-29 | End: 2019-05-29

## 2019-05-28 RX ADMIN — Medication 500 MILLIGRAM(S): at 17:09

## 2019-05-28 RX ADMIN — HALOPERIDOL DECANOATE 10 MILLIGRAM(S): 100 INJECTION INTRAMUSCULAR at 23:22

## 2019-05-28 RX ADMIN — HALOPERIDOL DECANOATE 10 MILLIGRAM(S): 100 INJECTION INTRAMUSCULAR at 12:32

## 2019-05-28 RX ADMIN — Medication 0.6 MILLIGRAM(S): at 12:31

## 2019-05-28 RX ADMIN — Medication 100 MILLIGRAM(S): at 12:32

## 2019-05-28 RX ADMIN — SENNA PLUS 2 TABLET(S): 8.6 TABLET ORAL at 23:22

## 2019-05-28 RX ADMIN — CALAMINE AND ZINC OXIDE AND PHENOL 1 APPLICATION(S): 160; 10 LOTION TOPICAL at 12:32

## 2019-05-28 RX ADMIN — PANTOPRAZOLE SODIUM 40 MILLIGRAM(S): 20 TABLET, DELAYED RELEASE ORAL at 06:32

## 2019-05-28 RX ADMIN — Medication 975 MILLIGRAM(S): at 19:37

## 2019-05-28 RX ADMIN — Medication 500 MILLIGRAM(S): at 06:32

## 2019-05-28 RX ADMIN — Medication 1 SUPPOSITORY(S): at 06:40

## 2019-05-28 RX ADMIN — Medication 975 MILLIGRAM(S): at 19:01

## 2019-05-28 RX ADMIN — ENOXAPARIN SODIUM 40 MILLIGRAM(S): 100 INJECTION SUBCUTANEOUS at 06:32

## 2019-05-28 RX ADMIN — Medication 100 MILLIGRAM(S): at 23:22

## 2019-05-28 RX ADMIN — POLYETHYLENE GLYCOL 3350 17 GRAM(S): 17 POWDER, FOR SOLUTION ORAL at 12:32

## 2019-05-28 RX ADMIN — CEFTRIAXONE 100 GRAM(S): 500 INJECTION, POWDER, FOR SOLUTION INTRAMUSCULAR; INTRAVENOUS at 17:09

## 2019-05-28 NOTE — PROGRESS NOTE ADULT - SUBJECTIVE AND OBJECTIVE BOX
Patient is a 24y old  Female who presents with a chief complaint of knee pain (27 May 2019 10:04)      SUBJECTIVE / OVERNIGHT EVENTS:  There were no acute events overnight.  The knee pain is better.  She still cannot move her bowels and continues to have rectal pain.    MEDICATIONS  (STANDING):  calamine Lotion 1 Application(s) Topical daily  colchicine 0.6 milliGRAM(s) Oral daily  enoxaparin Injectable 40 milliGRAM(s) SubCutaneous every 24 hours  haloperidol     Tablet 10 milliGRAM(s) Oral daily  haloperidol     Tablet 10 milliGRAM(s) Oral at bedtime  hydrocortisone hemorrhoidal Suppository 1 Suppository(s) Rectal two times a day  naproxen 500 milliGRAM(s) Oral two times a day  pantoprazole    Tablet 40 milliGRAM(s) Oral before breakfast    MEDICATIONS  (PRN):  acetaminophen   Tablet .. 975 milliGRAM(s) Oral every 8 hours PRN Mild Pain (1 - 3), Moderate Pain (4 - 6)  famotidine    Tablet 20 milliGRAM(s) Oral two times a day PRN reflux/heartburn  oxyCODONE    IR 5 milliGRAM(s) Oral every 8 hours PRN Severe Pain (7 - 10)  polyethylene glycol 3350 17 Gram(s) Oral daily PRN Constipation      Vital Signs Last 24 Hrs  T(C): 36.8 (28 May 2019 04:51), Max: 37.1 (27 May 2019 14:54)  T(F): 98.2 (28 May 2019 04:51), Max: 98.7 (27 May 2019 14:54)  HR: 89 (28 May 2019 04:51) (89 - 114)  BP: 120/81 (28 May 2019 04:51) (108/68 - 136/88)  BP(mean): --  RR: 20 (28 May 2019 04:51) (18 - 20)  SpO2: 96% (28 May 2019 04:51) (95% - 96%)  CAPILLARY BLOOD GLUCOSE        I&O's Summary    27 May 2019 07:01  -  28 May 2019 07:00  --------------------------------------------------------  IN: 360 mL / OUT: 800 mL / NET: -440 mL        PHYSICAL EXAM:  GENERAL: NAD, well-developed  HEAD:  Atraumatic, Normocephalic  EYES: EOMI, PERRLA, conjunctiva and sclera clear  NECK: Supple, No JVD  CHEST/LUNG: Clear to auscultation bilaterally; No wheeze  HEART: Regular rate and rhythm; No murmurs, rubs, or gallops  ABDOMEN: Soft, Nontender, Nondistended; Bowel sounds present  EXTREMITIES:  2+ Peripheral Pulses, No clubbing, cyanosis, or edema; right knee tender with increased temperature, but no erythema, or swelling. Wearing right knee immobilizer.  PSYCH: AAOx3  NEUROLOGY: non-focal  SKIN: No rashes or lesions    LABS:                Urinalysis Basic - ( 27 May 2019 13:22 )    Color: Light Orange / Appearance: Turbid / S.016 / pH: x  Gluc: x / Ketone: Negative  / Bili: Negative / Urobili: Negative   Blood: x / Protein: 30 mg/dL / Nitrite: Negative   Leuk Esterase: Large / RBC: 995 /hpf /  /HPF   Sq Epi: x / Non Sq Epi: 17 /hpf / Bacteria: Few        RADIOLOGY & ADDITIONAL TESTS:    Imaging Personally Reviewed:    Consultant(s) Notes Reviewed:      Care Discussed with Consultants/Other Providers: Patient is a 24y old  Female who presents with a chief complaint of knee pain (27 May 2019 10:04)      SUBJECTIVE / OVERNIGHT EVENTS:  There were no acute events overnight.  The knee pain is better.  She still cannot move her bowels and continues to have rectal pain.  She also endorsed dysuria 2 days ago and currently is unsure if it still burns. Denies f/c, back pain, abd pain.  [place] menstruation     MEDICATIONS  (STANDING):  calamine Lotion 1 Application(s) Topical daily  colchicine 0.6 milliGRAM(s) Oral daily  enoxaparin Injectable 40 milliGRAM(s) SubCutaneous every 24 hours  haloperidol     Tablet 10 milliGRAM(s) Oral daily  haloperidol     Tablet 10 milliGRAM(s) Oral at bedtime  hydrocortisone hemorrhoidal Suppository 1 Suppository(s) Rectal two times a day  naproxen 500 milliGRAM(s) Oral two times a day  pantoprazole    Tablet 40 milliGRAM(s) Oral before breakfast    MEDICATIONS  (PRN):  acetaminophen   Tablet .. 975 milliGRAM(s) Oral every 8 hours PRN Mild Pain (1 - 3), Moderate Pain (4 - 6)  famotidine    Tablet 20 milliGRAM(s) Oral two times a day PRN reflux/heartburn  oxyCODONE    IR 5 milliGRAM(s) Oral every 8 hours PRN Severe Pain (7 - 10)  polyethylene glycol 3350 17 Gram(s) Oral daily PRN Constipation      Vital Signs Last 24 Hrs  T(C): 36.8 (28 May 2019 04:51), Max: 37.1 (27 May 2019 14:54)  T(F): 98.2 (28 May 2019 04:51), Max: 98.7 (27 May 2019 14:54)  HR: 89 (28 May 2019 04:51) (89 - 114)  BP: 120/81 (28 May 2019 04:51) (108/68 - 136/88)  BP(mean): --  RR: 20 (28 May 2019 04:51) (18 - 20)  SpO2: 96% (28 May 2019 04:51) (95% - 96%)  CAPILLARY BLOOD GLUCOSE        I&O's Summary    27 May 2019 07:01  -  28 May 2019 07:00  --------------------------------------------------------  IN: 360 mL / OUT: 800 mL / NET: -440 mL        PHYSICAL EXAM:  GENERAL: NAD, well-developed  HEAD:  Atraumatic, Normocephalic  EYES: EOMI, PERRLA, conjunctiva and sclera clear  NECK: Supple, No JVD  CHEST/LUNG: Clear to auscultation bilaterally; No wheeze  HEART: Regular rate and rhythm; No murmurs, rubs, or gallops  ABDOMEN: Soft, Nontender, Nondistended; Bowel sounds present  EXTREMITIES:  2+ Peripheral Pulses, No clubbing, cyanosis, or edema; right knee mild tenderness but no erythema, or swelling. Wearing right knee immobilizer.  PSYCH: AAOx3  NEUROLOGY: non-focal      LABS:              Urinalysis Basic - ( 27 May 2019 13:22 )    Color: Light Orange / Appearance: Turbid / S.016 / pH: x  Gluc: x / Ketone: Negative  / Bili: Negative / Urobili: Negative   Blood: x / Protein: 30 mg/dL / Nitrite: Negative   Leuk Esterase: Large / RBC: 995 /hpf /  /HPF   Sq Epi: x / Non Sq Epi: 17 /hpf / Bacteria: Few        RADIOLOGY & ADDITIONAL TESTS:    Imaging Personally Reviewed:    Consultant(s) Notes Reviewed:      Care Discussed with Consultants/Other Providers:

## 2019-05-28 NOTE — PROGRESS NOTE ADULT - PROBLEM SELECTOR PLAN 6
- DVT: lovenox  - Diet: regular  - Dispo: home with PT vs [facility] - inconsistent use of topical treatment. C/w anusol

## 2019-05-28 NOTE — PROGRESS NOTE ADULT - PROBLEM SELECTOR PLAN 5
- inconsistent use of topical treatment. C/w anusol - wound care by nursing, frequent repositioning.

## 2019-05-28 NOTE — PROGRESS NOTE ADULT - PROBLEM SELECTOR PLAN 3
- c/w plan as above. Use knee immobilizer.   - patient unable to perform ADLs at home and mother having difficulty caring for patient. - continue with home haldol 10mg in the AM, 10mg PM

## 2019-05-28 NOTE — PROGRESS NOTE ADULT - PROBLEM SELECTOR PLAN 1
Presumed R patella dislocation. Remains mostly bedbound and unable to perform ADLs.    - Ortho is following; MRI is pending  - pain improved with trial of colchicine; will give another dose today.  - c/w naproxen, Tylenol PRN and low dose oxycodone PRN for severe pain  - PT evaluation pending Presumed R patella dislocation.  - Ortho is following; MRI is pending  - pain improved s/p colchicine   - c/w naproxen, Tylenol PRN and low dose oxycodone PRN for severe pain  - PT evaluation rec home PT vs [facility]

## 2019-05-28 NOTE — PROGRESS NOTE ADULT - PROBLEM SELECTOR PLAN 2
- continue with home haldol 10mg in the AM, 10mg PM +UA x  2with intermittent dysuria   -repeat UA with +bacteria, LE, wbc no epithtial cells  -start ceftriaxone 1 gram daily  -f/u urine culture  -cbc in AM

## 2019-05-29 ENCOUNTER — TRANSCRIPTION ENCOUNTER (OUTPATIENT)
Age: 24
End: 2019-05-29

## 2019-05-29 VITALS
SYSTOLIC BLOOD PRESSURE: 130 MMHG | RESPIRATION RATE: 20 BRPM | OXYGEN SATURATION: 98 % | DIASTOLIC BLOOD PRESSURE: 82 MMHG | TEMPERATURE: 99 F | HEART RATE: 101 BPM

## 2019-05-29 LAB
BASOPHILS # BLD AUTO: 0 K/UL — SIGNIFICANT CHANGE UP (ref 0–0.2)
BASOPHILS NFR BLD AUTO: 0.5 % — SIGNIFICANT CHANGE UP (ref 0–2)
EOSINOPHIL # BLD AUTO: 0.2 K/UL — SIGNIFICANT CHANGE UP (ref 0–0.5)
EOSINOPHIL NFR BLD AUTO: 2.7 % — SIGNIFICANT CHANGE UP (ref 0–6)
HCT VFR BLD CALC: 36.4 % — SIGNIFICANT CHANGE UP (ref 34.5–45)
HGB BLD-MCNC: 12.2 G/DL — SIGNIFICANT CHANGE UP (ref 11.5–15.5)
LYMPHOCYTES # BLD AUTO: 3.3 K/UL — SIGNIFICANT CHANGE UP (ref 1–3.3)
LYMPHOCYTES # BLD AUTO: 39.7 % — SIGNIFICANT CHANGE UP (ref 13–44)
MCHC RBC-ENTMCNC: 32.2 PG — SIGNIFICANT CHANGE UP (ref 27–34)
MCHC RBC-ENTMCNC: 33.5 GM/DL — SIGNIFICANT CHANGE UP (ref 32–36)
MCV RBC AUTO: 95.9 FL — SIGNIFICANT CHANGE UP (ref 80–100)
MONOCYTES # BLD AUTO: 0.8 K/UL — SIGNIFICANT CHANGE UP (ref 0–0.9)
MONOCYTES NFR BLD AUTO: 9.9 % — SIGNIFICANT CHANGE UP (ref 2–14)
NEUTROPHILS # BLD AUTO: 3.9 K/UL — SIGNIFICANT CHANGE UP (ref 1.8–7.4)
NEUTROPHILS NFR BLD AUTO: 47.2 % — SIGNIFICANT CHANGE UP (ref 43–77)
PLATELET # BLD AUTO: 381 K/UL — SIGNIFICANT CHANGE UP (ref 150–400)
RBC # BLD: 3.8 M/UL — SIGNIFICANT CHANGE UP (ref 3.8–5.2)
RBC # FLD: 14.1 % — SIGNIFICANT CHANGE UP (ref 10.3–14.5)
WBC # BLD: 8.3 K/UL — SIGNIFICANT CHANGE UP (ref 3.8–10.5)
WBC # FLD AUTO: 8.3 K/UL — SIGNIFICANT CHANGE UP (ref 3.8–10.5)

## 2019-05-29 PROCEDURE — 87086 URINE CULTURE/COLONY COUNT: CPT

## 2019-05-29 PROCEDURE — 80053 COMPREHEN METABOLIC PANEL: CPT

## 2019-05-29 PROCEDURE — 99239 HOSP IP/OBS DSCHRG MGMT >30: CPT

## 2019-05-29 PROCEDURE — 97162 PT EVAL MOD COMPLEX 30 MIN: CPT

## 2019-05-29 PROCEDURE — 84443 ASSAY THYROID STIM HORMONE: CPT

## 2019-05-29 PROCEDURE — 99285 EMERGENCY DEPT VISIT HI MDM: CPT

## 2019-05-29 PROCEDURE — 85027 COMPLETE CBC AUTOMATED: CPT

## 2019-05-29 PROCEDURE — 73560 X-RAY EXAM OF KNEE 1 OR 2: CPT

## 2019-05-29 PROCEDURE — 85652 RBC SED RATE AUTOMATED: CPT

## 2019-05-29 PROCEDURE — 73564 X-RAY EXAM KNEE 4 OR MORE: CPT

## 2019-05-29 PROCEDURE — 86140 C-REACTIVE PROTEIN: CPT

## 2019-05-29 PROCEDURE — 81001 URINALYSIS AUTO W/SCOPE: CPT

## 2019-05-29 PROCEDURE — 73721 MRI JNT OF LWR EXTRE W/O DYE: CPT

## 2019-05-29 PROCEDURE — 87186 SC STD MICRODIL/AGAR DIL: CPT

## 2019-05-29 RX ORDER — HYDROCORTISONE 1 %
1 OINTMENT (GRAM) TOPICAL
Qty: 28 | Refills: 0
Start: 2019-05-29 | End: 2019-06-11

## 2019-05-29 RX ORDER — CEPHALEXIN 500 MG
1 CAPSULE ORAL
Qty: 3 | Refills: 0
Start: 2019-05-29 | End: 2019-05-29

## 2019-05-29 RX ADMIN — Medication 100 MILLIGRAM(S): at 06:10

## 2019-05-29 RX ADMIN — CEFTRIAXONE 100 GRAM(S): 500 INJECTION, POWDER, FOR SOLUTION INTRAMUSCULAR; INTRAVENOUS at 13:07

## 2019-05-29 RX ADMIN — ENOXAPARIN SODIUM 40 MILLIGRAM(S): 100 INJECTION SUBCUTANEOUS at 06:09

## 2019-05-29 RX ADMIN — POLYETHYLENE GLYCOL 3350 17 GRAM(S): 17 POWDER, FOR SOLUTION ORAL at 11:07

## 2019-05-29 RX ADMIN — PANTOPRAZOLE SODIUM 40 MILLIGRAM(S): 20 TABLET, DELAYED RELEASE ORAL at 06:09

## 2019-05-29 RX ADMIN — Medication 500 MILLIGRAM(S): at 06:09

## 2019-05-29 RX ADMIN — CALAMINE AND ZINC OXIDE AND PHENOL 1 APPLICATION(S): 160; 10 LOTION TOPICAL at 11:07

## 2019-05-29 RX ADMIN — HALOPERIDOL DECANOATE 10 MILLIGRAM(S): 100 INJECTION INTRAMUSCULAR at 11:07

## 2019-05-29 RX ADMIN — Medication 100 MILLIGRAM(S): at 11:07

## 2019-05-29 NOTE — PROGRESS NOTE ADULT - PROBLEM SELECTOR PLAN 7
- DVT: lovenox  - Diet: regular  - Dispo: home with PT
- DVT: lovenox  - Diet: regular  - Dispo: home with PT vs [facility]

## 2019-05-29 NOTE — PROGRESS NOTE ADULT - ASSESSMENT
25 yo F w/ PMH schizoaffective disorder, autism p/w R knee pain and inability to ambulate, presumably from prior patellar dislocation. 23 yo F w/ PMH schizoaffective disorder, autism p/w R knee pain and inability to ambulate, presumably from prior patellar dislocation also with UTI

## 2019-05-29 NOTE — PROGRESS NOTE ADULT - PROBLEM SELECTOR PLAN 1
2/2 R patella dislocation.  - Ortho is following; MRI with severe lateral subluxation of patella without current dislocation, moderate large joint effusion  - as per Ortho, no further inpatient plans, will discharge with outpatient follow up  - patient is to remain in knee immobilizer at all times for 4 weeks.  - pain improved, s/p colchicine   - c/w naproxen, Tylenol PRN and low dose oxycodone PRN for severe pain  - PT evaluation rec home PT vs [facility]; patient will go home with PT 2/2 R patella dislocation.  - Ortho is following; MRI with severe lateral subluxation of patella without current dislocation, moderate large joint effusion  - as per Ortho, no further inpatient plans, will discharge with outpatient follow up  - patient is to remain in knee immobilizer at all times for 4 weeks, outpatient f/u with Dr Yepez  - c/w naproxen, Tylenol PRN and low dose oxycodone PRN for severe pain  - PT evaluation rec home PT vs [facility]; patient choose home with PT

## 2019-05-29 NOTE — PROGRESS NOTE ADULT - PROBLEM SELECTOR PLAN 2
+UA x 2 with intermittent dysuria   - repeat U/A with +bacteria, LE, wbc no epithelial cells  - Day 2/3 ceftriaxone; will discharge with one day of kephlex  - UCX pending  - CBC today WNL Improvined, +UA x 2 with intermittent dysuria, urine cx with GNR, symptoms improved no leukocytosis, afebrile, no hx of resistant or recurrent UTI per mom  - repeat U/A with +bacteria, LE, wbc no epithelial cells  - Day 2/3 ceftriaxone; will discharge with one day of keflex 500mg TID to complete 3 day course for uncomplicated UTI, will call patient if any resistance on urine cx

## 2019-05-29 NOTE — PROGRESS NOTE ADULT - SUBJECTIVE AND OBJECTIVE BOX
Patient is a 24y old  Female who presents with a chief complaint of knee pain (28 May 2019 10:21)      SUBJECTIVE / OVERNIGHT EVENTS:  There were no acute events overnight.   The patient's right knee pain is better.  She is moving her bowels more.    MEDICATIONS  (STANDING):  calamine Lotion 1 Application(s) Topical daily  cefTRIAXone   IVPB 1 Gram(s) IV Intermittent every 24 hours  cefTRIAXone   IVPB      docusate sodium 100 milliGRAM(s) Oral three times a day  enoxaparin Injectable 40 milliGRAM(s) SubCutaneous every 24 hours  haloperidol     Tablet 10 milliGRAM(s) Oral daily  haloperidol     Tablet 10 milliGRAM(s) Oral at bedtime  hydrocortisone hemorrhoidal Suppository 1 Suppository(s) Rectal two times a day  naproxen 500 milliGRAM(s) Oral two times a day  pantoprazole    Tablet 40 milliGRAM(s) Oral before breakfast  polyethylene glycol 3350 17 Gram(s) Oral daily  senna 2 Tablet(s) Oral at bedtime    MEDICATIONS  (PRN):  acetaminophen   Tablet .. 975 milliGRAM(s) Oral every 8 hours PRN Mild Pain (1 - 3), Moderate Pain (4 - 6)  famotidine    Tablet 20 milliGRAM(s) Oral two times a day PRN reflux/heartburn  oxyCODONE    IR 5 milliGRAM(s) Oral every 8 hours PRN Severe Pain (7 - 10)      Vital Signs Last 24 Hrs  T(C): 36.7 (29 May 2019 04:35), Max: 37.1 (28 May 2019 13:35)  T(F): 98.1 (29 May 2019 04:35), Max: 98.8 (28 May 2019 13:35)  HR: 96 (29 May 2019 04:35) (96 - 106)  BP: 113/75 (29 May 2019 04:35) (113/75 - 136/81)  BP(mean): --  RR: 18 (29 May 2019 04:35) (18 - 20)  SpO2: 97% (29 May 2019 04:35) (95% - 97%)  CAPILLARY BLOOD GLUCOSE        I&O's Summary    28 May 2019 07:01  -  29 May 2019 07:00  --------------------------------------------------------  IN: 720 mL / OUT: 1400 mL / NET: -680 mL        PHYSICAL EXAM:  GENERAL: NAD, well-developed  HEAD:  Atraumatic, Normocephalic  EYES: EOMI, PERRLA, conjunctiva and sclera clear  NECK: Supple, No JVD  CHEST/LUNG: Clear to auscultation bilaterally; No wheeze  HEART: Regular rate and rhythm; No murmurs, rubs, or gallops  ABDOMEN: Soft, Nontender, Nondistended; Bowel sounds present  EXTREMITIES:  2+ Peripheral Pulses, No clubbing, cyanosis, or edema; right knee is nontender with normal temperature; knee immobilizer absent  PSYCH: AAOx3  NEUROLOGY: non-focal  SKIN: No rashes or lesions    LABS:                        12.2   8.3   )-----------( 381      ( 29 May 2019 06:46 )             36.4                 Urinalysis Basic - ( 28 May 2019 12:55 )    Color: Yellow / Appearance: Turbid / S.014 / pH: x  Gluc: x / Ketone: Negative  / Bili: Negative / Urobili: Negative   Blood: x / Protein: Trace / Nitrite: Negative   Leuk Esterase: Large / RBC: 10 /hpf / WBC >50 /HPF   Sq Epi: x / Non Sq Epi: 0 /hpf / Bacteria: Many        RADIOLOGY & ADDITIONAL TESTS:    Imaging Personally Reviewed:    Consultant(s) Notes Reviewed:      Care Discussed with Consultants/Other Providers:    < from:  Knee No Cont, Right (19 @ 22:40) >      < end of copied text > Patient is a 24y old  Female who presents with a chief complaint of knee pain (28 May 2019 10:21)      SUBJECTIVE / OVERNIGHT EVENTS:  There were no acute events overnight.   The patient's right knee pain is better.  She is moving her bowels more.    MEDICATIONS  (STANDING):  calamine Lotion 1 Application(s) Topical daily  cefTRIAXone   IVPB 1 Gram(s) IV Intermittent every 24 hours  cefTRIAXone   IVPB      docusate sodium 100 milliGRAM(s) Oral three times a day  enoxaparin Injectable 40 milliGRAM(s) SubCutaneous every 24 hours  haloperidol     Tablet 10 milliGRAM(s) Oral daily  haloperidol     Tablet 10 milliGRAM(s) Oral at bedtime  hydrocortisone hemorrhoidal Suppository 1 Suppository(s) Rectal two times a day  naproxen 500 milliGRAM(s) Oral two times a day  pantoprazole    Tablet 40 milliGRAM(s) Oral before breakfast  polyethylene glycol 3350 17 Gram(s) Oral daily  senna 2 Tablet(s) Oral at bedtime    MEDICATIONS  (PRN):  acetaminophen   Tablet .. 975 milliGRAM(s) Oral every 8 hours PRN Mild Pain (1 - 3), Moderate Pain (4 - 6)  famotidine    Tablet 20 milliGRAM(s) Oral two times a day PRN reflux/heartburn  oxyCODONE    IR 5 milliGRAM(s) Oral every 8 hours PRN Severe Pain (7 - 10)      Vital Signs Last 24 Hrs  T(C): 36.7 (29 May 2019 04:35), Max: 37.1 (28 May 2019 13:35)  T(F): 98.1 (29 May 2019 04:35), Max: 98.8 (28 May 2019 13:35)  HR: 96 (29 May 2019 04:35) (96 - 106)  BP: 113/75 (29 May 2019 04:35) (113/75 - 136/81)  BP(mean): --  RR: 18 (29 May 2019 04:35) (18 - 20)  SpO2: 97% (29 May 2019 04:35) (95% - 97%)  CAPILLARY BLOOD GLUCOSE        I&O's Summary    28 May 2019 07:01  -  29 May 2019 07:00  --------------------------------------------------------  IN: 720 mL / OUT: 1400 mL / NET: -680 mL        PHYSICAL EXAM:  GENERAL: NAD, well-developed  HEAD:  Atraumatic, Normocephalic  EYES: EOMI, PERRLA, conjunctiva and sclera clear  NECK: Supple, No JVD  CHEST/LUNG: Clear to auscultation bilaterally; No wheeze  HEART: Regular rate and rhythm; No murmurs, rubs, or gallops  ABDOMEN: Soft, Nontender, Nondistended; Bowel sounds present  EXTREMITIES:  2+ Peripheral Pulses, No clubbing, cyanosis, or edema; right knee is nontender with normal temperature; knee immobilizer absent  PSYCH: AAOx3  NEUROLOGY: non-focal  SKIN: No rashes or lesions    LABS:                        12.2   8.3   )-----------( 381      ( 29 May 2019 06:46 )             36.4                 Urinalysis Basic - ( 28 May 2019 12:55 )    Color: Yellow / Appearance: Turbid / S.014 / pH: x  Gluc: x / Ketone: Negative  / Bili: Negative / Urobili: Negative   Blood: x / Protein: Trace / Nitrite: Negative   Leuk Esterase: Large / RBC: 10 /hpf / WBC >50 /HPF   Sq Epi: x / Non Sq Epi: 0 /hpf / Bacteria: Many        RADIOLOGY & ADDITIONAL TESTS:    Imaging Personally Reviewed:    Consultant(s) Notes Reviewed:      Care Discussed with Consultants/Other Providers:    < from: MR Knee No Cont, Right (19 @ 22:40) >  IMPRESSION: Findings compatible with a recent transient patellar   dislocation with persistent lateral subluxation of the patella but   without current dislocation. Partial tearing at the MPFL origin and   retinacular insertion medially. Partial tearing of the lateral   retinacular structures as well with marked adjacent periretinacular and   perimuscular soft tissue edema laterally and posteriorly.    Moderate to large joint effusion.

## 2019-05-29 NOTE — PROGRESS NOTE ADULT - PROBLEM SELECTOR PLAN 5
- wound care by nursing, frequent repositioning. - inconsistent use of topical treatment. C/w anusol

## 2019-05-29 NOTE — CHART NOTE - NSCHARTNOTEFT_GEN_A_CORE
MRI Reviewed.   Evidence of recent patellar dislocation. patella laterally subluxed.   Recommend WBAT in Knee immobilizer at all times x 4 weeks total.   High risk for recurrent dislocations with noncompliance.   FU with Dr. Winn in office in 1 week.   No further inpatient treatment at this time.

## 2019-05-29 NOTE — PROGRESS NOTE ADULT - PROBLEM SELECTOR PLAN 6
- inconsistent use of topical treatment. C/w anusol - DVT: lovenox  - Diet: regular  - Dispo: discharge home with PT with outpatient ortho followup   spent 45 min on discharge process time

## 2019-05-29 NOTE — PROGRESS NOTE ADULT - PROBLEM SELECTOR PROBLEM 1
Right knee pain, unspecified chronicity

## 2019-05-30 LAB
-  AMIKACIN: SIGNIFICANT CHANGE UP
-  AMIKACIN: SIGNIFICANT CHANGE UP
-  AMPICILLIN/SULBACTAM: SIGNIFICANT CHANGE UP
-  AMPICILLIN/SULBACTAM: SIGNIFICANT CHANGE UP
-  AMPICILLIN: SIGNIFICANT CHANGE UP
-  AMPICILLIN: SIGNIFICANT CHANGE UP
-  AZTREONAM: SIGNIFICANT CHANGE UP
-  AZTREONAM: SIGNIFICANT CHANGE UP
-  CEFEPIME: SIGNIFICANT CHANGE UP
-  CEFEPIME: SIGNIFICANT CHANGE UP
-  CEFOXITIN: SIGNIFICANT CHANGE UP
-  CEFOXITIN: SIGNIFICANT CHANGE UP
-  CEFTRIAXONE: SIGNIFICANT CHANGE UP
-  CEFTRIAXONE: SIGNIFICANT CHANGE UP
-  CIPROFLOXACIN: SIGNIFICANT CHANGE UP
-  CIPROFLOXACIN: SIGNIFICANT CHANGE UP
-  ERTAPENEM: SIGNIFICANT CHANGE UP
-  ERTAPENEM: SIGNIFICANT CHANGE UP
-  GENTAMICIN: SIGNIFICANT CHANGE UP
-  GENTAMICIN: SIGNIFICANT CHANGE UP
-  IMIPENEM: SIGNIFICANT CHANGE UP
-  LEVOFLOXACIN: SIGNIFICANT CHANGE UP
-  LEVOFLOXACIN: SIGNIFICANT CHANGE UP
-  MEROPENEM: SIGNIFICANT CHANGE UP
-  MEROPENEM: SIGNIFICANT CHANGE UP
-  NITROFURANTOIN: SIGNIFICANT CHANGE UP
-  NITROFURANTOIN: SIGNIFICANT CHANGE UP
-  PIPERACILLIN/TAZOBACTAM: SIGNIFICANT CHANGE UP
-  PIPERACILLIN/TAZOBACTAM: SIGNIFICANT CHANGE UP
-  TIGECYCLINE: SIGNIFICANT CHANGE UP
-  TOBRAMYCIN: SIGNIFICANT CHANGE UP
-  TOBRAMYCIN: SIGNIFICANT CHANGE UP
-  TRIMETHOPRIM/SULFAMETHOXAZOLE: SIGNIFICANT CHANGE UP
-  TRIMETHOPRIM/SULFAMETHOXAZOLE: SIGNIFICANT CHANGE UP
CULTURE RESULTS: SIGNIFICANT CHANGE UP
METHOD TYPE: SIGNIFICANT CHANGE UP
METHOD TYPE: SIGNIFICANT CHANGE UP
ORGANISM # SPEC MICROSCOPIC CNT: SIGNIFICANT CHANGE UP
SPECIMEN SOURCE: SIGNIFICANT CHANGE UP

## 2019-06-21 ENCOUNTER — APPOINTMENT (OUTPATIENT)
Dept: ORTHOPEDIC SURGERY | Facility: CLINIC | Age: 24
End: 2019-06-21
Payer: MEDICAID

## 2019-06-21 VITALS — WEIGHT: 258 LBS | HEIGHT: 63 IN | BODY MASS INDEX: 45.71 KG/M2

## 2019-06-21 DIAGNOSIS — S83.004A UNSPECIFIED DISLOCATION OF RIGHT PATELLA, INITIAL ENCOUNTER: ICD-10-CM

## 2019-06-21 DIAGNOSIS — M25.461 EFFUSION, RIGHT KNEE: ICD-10-CM

## 2019-06-21 DIAGNOSIS — M25.561 PAIN IN RIGHT KNEE: ICD-10-CM

## 2019-06-21 PROCEDURE — 73562 X-RAY EXAM OF KNEE 3: CPT | Mod: RT

## 2019-06-21 PROCEDURE — 99204 OFFICE O/P NEW MOD 45 MIN: CPT

## 2019-07-10 PROCEDURE — 80048 BASIC METABOLIC PNL TOTAL CA: CPT

## 2019-07-10 PROCEDURE — 85730 THROMBOPLASTIN TIME PARTIAL: CPT

## 2019-07-10 PROCEDURE — 84295 ASSAY OF SERUM SODIUM: CPT

## 2019-07-10 PROCEDURE — 82947 ASSAY GLUCOSE BLOOD QUANT: CPT

## 2019-07-10 PROCEDURE — 82550 ASSAY OF CK (CPK): CPT

## 2019-07-10 PROCEDURE — 85027 COMPLETE CBC AUTOMATED: CPT

## 2019-07-10 PROCEDURE — 85610 PROTHROMBIN TIME: CPT

## 2019-07-10 PROCEDURE — 73562 X-RAY EXAM OF KNEE 3: CPT

## 2019-07-10 PROCEDURE — 84702 CHORIONIC GONADOTROPIN TEST: CPT

## 2019-07-10 PROCEDURE — 82803 BLOOD GASES ANY COMBINATION: CPT

## 2019-07-10 PROCEDURE — 97161 PT EVAL LOW COMPLEX 20 MIN: CPT

## 2019-07-10 PROCEDURE — 85014 HEMATOCRIT: CPT

## 2019-07-10 PROCEDURE — 82435 ASSAY OF BLOOD CHLORIDE: CPT

## 2019-07-10 PROCEDURE — 82330 ASSAY OF CALCIUM: CPT

## 2019-07-10 PROCEDURE — 93971 EXTREMITY STUDY: CPT

## 2019-07-10 PROCEDURE — 80053 COMPREHEN METABOLIC PANEL: CPT

## 2019-07-10 PROCEDURE — 84132 ASSAY OF SERUM POTASSIUM: CPT

## 2019-07-10 PROCEDURE — 99285 EMERGENCY DEPT VISIT HI MDM: CPT

## 2019-07-10 PROCEDURE — 83735 ASSAY OF MAGNESIUM: CPT

## 2019-07-10 PROCEDURE — 83605 ASSAY OF LACTIC ACID: CPT

## 2019-12-19 NOTE — H&P ADULT - MUSCULOSKELETAL
A1c 6.5 been off the januvia for 4 months due to cost  LDL 36  GFR stable 42, last year normal urine microalbumin, repeat lab ordered  Meds: asa 81 mg,, simvastatin 40 mg, glimepiride 2 mg, fenofibrate 134mg  Fasting BS are excellent  since he stopped drinking a gallon of milk daily.    details… detailed exam

## 2020-01-19 ENCOUNTER — EMERGENCY (EMERGENCY)
Facility: HOSPITAL | Age: 25
LOS: 1 days | Discharge: HOME | End: 2020-01-19
Admitting: PERSONAL EMERGENCY RESPONSE ATTENDANT
Payer: MEDICAID

## 2020-01-19 VITALS
DIASTOLIC BLOOD PRESSURE: 78 MMHG | RESPIRATION RATE: 18 BRPM | TEMPERATURE: 98 F | SYSTOLIC BLOOD PRESSURE: 126 MMHG | HEART RATE: 101 BPM | OXYGEN SATURATION: 100 %

## 2020-01-19 PROCEDURE — 71046 X-RAY EXAM CHEST 2 VIEWS: CPT | Mod: 26

## 2020-01-19 PROCEDURE — 99283 EMERGENCY DEPT VISIT LOW MDM: CPT

## 2020-01-19 RX ORDER — IPRATROPIUM/ALBUTEROL SULFATE 18-103MCG
3 AEROSOL WITH ADAPTER (GRAM) INHALATION ONCE
Refills: 0 | Status: COMPLETED | OUTPATIENT
Start: 2020-01-19 | End: 2020-01-19

## 2020-01-19 RX ADMIN — Medication 3 MILLILITER(S): at 14:54

## 2020-01-19 NOTE — ED PROVIDER NOTE - PATIENT PORTAL LINK FT
You can access the FollowMyHealth Patient Portal offered by [facility] by registering at the following website: [email or web address]. By joining [employer]’s FollowMyHealth portal, you will also be able to view your health information using other applications (apps) compatible with our system.

## 2020-01-19 NOTE — ED PROVIDER NOTE - CLINICAL SUMMARY MEDICAL DECISION MAKING FREE TEXT BOX
26 y/o female c/o cough x 2 weeks after influenza infection  -probable flu residual, r/o pna  -cxr, duoneb  -tessalon rx, pmd follow up

## 2020-01-19 NOTE — ED ADULT TRIAGE NOTE - CHIEF COMPLAINT QUOTE
as per mother with dry non productive cough, persistent causing nausea and vomiting at times. denies fever, reports' was diagnosed with flu 2 weeks ago. hx of Autism, on Haldol for schizophrenia. pt is calm and cooperative in triage, denies c/p or SOB at present

## 2020-01-19 NOTE — ED PROVIDER NOTE - OBJECTIVE STATEMENT
26 y/o female hx autism, schizoaffetive (on haldol) presents to ER c/o cough x 2 weeks. Pt. states 2 weeks ago developed congestion cough ect - went to urgent care and diagnosed with + flu. Pt. states overall feeling much better but states still persistent cough - dry and usually worse at night. Denies fever chills nausea vomit diarrhea weakness dizziness.

## 2020-02-23 ENCOUNTER — EMERGENCY (EMERGENCY)
Facility: HOSPITAL | Age: 25
LOS: 1 days | Discharge: HOME | End: 2020-02-23
Attending: EMERGENCY MEDICINE
Payer: MEDICAID

## 2020-02-23 VITALS
TEMPERATURE: 98 F | DIASTOLIC BLOOD PRESSURE: 90 MMHG | HEART RATE: 85 BPM | SYSTOLIC BLOOD PRESSURE: 134 MMHG | OXYGEN SATURATION: 99 % | RESPIRATION RATE: 16 BRPM

## 2020-02-23 PROCEDURE — 99283 EMERGENCY DEPT VISIT LOW MDM: CPT

## 2020-02-23 RX ORDER — LORATADINE 10 MG/1
1 TABLET ORAL
Qty: 30 | Refills: 0
Start: 2020-02-23 | End: 2020-03-23

## 2020-02-23 NOTE — ED PROVIDER NOTE - NSFOLLOWUPCLINICS_GEN_ALL_ED_FT
[facility] Pulmonolgy and Sleep Medicine  Pulmonology  [address] [place] [address]  Phone: [phone]  Fax:   Follow Up Time:

## 2020-02-23 NOTE — ED ADULT TRIAGE NOTE - SOURCE OF INFORMATION
The Service to [facility] med order in workqueue [id number] requested on 2/1/2019 has been removed as, unable to contact. Ordering provider has been notified.    Please contact patient, if further communication is needed.   Patient

## 2020-02-23 NOTE — ED PROVIDER NOTE - PATIENT PORTAL LINK FT
You can access the FollowMyHealth Patient Portal offered by [facility] by registering at the following website: [email or web address]. By joining [employer]’s FollowMyHealth portal, you will also be able to view your health information using other applications (apps) compatible with our system.

## 2020-02-23 NOTE — ED PROVIDER NOTE - OBJECTIVE STATEMENT
26 y/o female with Schizo-affective disorder presents to the ED c/o cough for several weeks. States her cough is worse at night. She had an albuterol treatment at  ER but not at home which minimally helped her cough. She was prescribed a course of antibiotic, but only took Augmentin for 3 days as she experienced diarrhea that prevented her from completing the course. She does not note any sinus infection or pain. Denies ear pain. Mouth is dry from her schizo affective disorder medication. She is not on any conlgentin. Denies any other concerns. Denies headache, N/V, fever or SOB. She does report occasional diarrhea. She is in the exam room with her mother. Her last menstrual was mid January and states she is not having any sex. Her mother confirms that her menstrual cycle is irregular. Denies smoking or any one in the family smoking . She does not find that walking in the cold makes her cough any worse and she does not have any exercise in her life to compare her breathing to while exercising.

## 2020-02-23 NOTE — ED PROVIDER NOTE - CLINICAL SUMMARY MEDICAL DECISION MAKING FREE TEXT BOX
24 y/o female with likely allergic cough not on Benadryl. Will give benadryl and refer her to get PFT with her PMD.

## 2020-02-23 NOTE — ED PROVIDER NOTE - CONSTITUTIONAL, MLM
normal... Obese, Well appearing, awake, alert, oriented to person, place, time/situation and in no apparent distress.

## 2020-02-23 NOTE — ED PROVIDER NOTE - NSFOLLOWUPINSTRUCTIONS_ED_ALL_ED_FT
cough at night is often allergies.  you can take loratidine (longer acting) 10mg 1 time a day or 50 mg of benadryl (diphenhydramine) , especially at night but can be every 6 hours    return if fever.    go to primary care doctor and ask for pulmonary function tests

## 2020-09-28 NOTE — ED ADULT NURSE NOTE - NSFALLRSKOUTCOME_ED_ALL_ED
[Normal Growth] : growth [Normal Development] : development  [No Elimination Concerns] : elimination [Continue Regimen] : feeding [No Skin Concerns] : skin [Normal Sleep Pattern] : sleep [None] : no medical problems [Anticipatory Guidance Given] : Anticipatory guidance addressed as per the history of present illness section [Physical Growth and Development] : physical growth and development [Social and Academic Competence] : social and academic competence [Emotional Well-Being] : emotional well-being [Risk Reduction] : risk reduction [No Vaccines] : no vaccines needed Universal Safety Interventions [No Medications] : ~He/She~ is not on any medications [Patient] : patient [Parent/Guardian] : Parent/Guardian [Full Activity without restrictions including Physical Education & Athletics] : Full Activity without restrictions including Physical Education & Athletics [FreeTextEntry1] : THis is a adolescent patient here for routine exam .I  have recommended that the patient participates in 60 minutes or more of physical activity a day.   I explained that it is important to limit screen time to less than 2 hrs a day. Patients diet was discussed and advice given on how to maintain good healthy caloric intake .\par Physical exam is within normal limits . Immunizations were discussed and received HPV and flu\par Patient to follow up in 1 year for routine exam \par \par \par

## 2021-01-12 NOTE — DISCHARGE NOTE NURSING/CASE MANAGEMENT/SOCIAL WORK - NSDCDPATPORTLINK_GEN_ALL_CORE
Kymberly Torres is a 28 y.o. male is here today for medication management follow-up.    Chief Complaint:  Recheck on behaviors and hallucinations    History of Present Illness: Patient presents with saline staff member.  She states that overall he is doing well.  States that he is sleeping better now on the current medication.  States that his mother has brought up that she would like him to have the XR version of the Adderall instead of the immediate release.  Staff member does state that his Adderall seems to wear off early in the afternoon and he becomes a little more irritable and hyperactive.  States that there has only been 1 episode of bad behavior.  She says that he always does this prior to a home visit.  He did go home over [place] and a couple of days before going he was out of control and was biting, scratching and tried to throw a table on a staff member.  She says that other than this behavior it has been manageable.  Says that his Adderall seems to be wearing off around noon to 1:00 PM.  Says that he has not been talking to himself or laughing hysterically as in the past since he is back up on the current dose of Seroquel.  Sleeping well without any difficulty.  No acute medical stressors.  No negative side effects to the medication.  No tremors.Body mass index is 25.19 kg/m². no appetite changes.      The following portions of the patient's history were reviewed and updated as appropriate: allergies, current medications, past family history, past medical history, past social history, past surgical history and problem list.    Review of Systems   Constitutional: Negative for activity change, appetite change and fatigue.   HENT: Negative.    Eyes: Negative for visual disturbance.   Respiratory: Negative.    Cardiovascular: Negative.    Gastrointestinal: Negative for nausea.   Endocrine: Negative.    Genitourinary: Negative.    Musculoskeletal: Negative for arthralgias.   Skin:  "Negative.    Allergic/Immunologic: Negative.    Neurological: Negative for dizziness, seizures and headaches.   Hematological: Negative.    Psychiatric/Behavioral: Positive for agitation and behavioral problems. Negative for confusion, decreased concentration, dysphoric mood, hallucinations, self-injury, sleep disturbance and suicidal ideas. The patient is not nervous/anxious and is not hyperactive.        Objective   Physical Exam  Vitals signs reviewed.   Constitutional:       Appearance: He is normal weight.   Neck:      Musculoskeletal: Normal range of motion.   Neurological:      Mental Status: He is alert.   Psychiatric:         Attention and Perception: He is inattentive.         Mood and Affect: Mood is anxious.         Behavior: Behavior is cooperative.         Cognition and Memory: Cognition is impaired.         Judgment: Judgment is impulsive.      Comments: Cooperative.  Had to be redirected by staff member several times.  Speech is hard to understand.         Blood pressure 122/78, pulse 97, temperature 96.9 °F (36.1 °C), height 162.6 cm (64.02\"), weight 66.6 kg (146 lb 12.8 oz).    Medication List:   Current Outpatient Medications   Medication Sig Dispense Refill   • levothyroxine (SYNTHROID, LEVOTHROID) 125 MCG tablet Take 125 mcg by mouth Daily.     • propranolol (INDERAL) 20 MG tablet Take 1 tablet by mouth 2 (Two) Times a Day. 60 tablet 2   • QUEtiapine (SEROquel) 400 MG tablet 1/2 in the AM 1/2 in the afternoon and 1 at HS 60 tablet 2   • amphetamine-dextroamphetamine XR (ADDERALL XR) 15 MG 24 hr capsule Take 1 capsule by mouth Every Morning 30 capsule 0     No current facility-administered medications for this visit.        Mental Status Exam:   Hygiene:   fair  Cooperation:  Cooperative  Eye Contact:  Poor  Psychomotor Behavior:  Restless  Affect:  Blunted  Hopelessness: Denies  Speech:  difficult to understand  Thought Process:  Unable to demonstrate  Thought Content:  Unable to " demonstrate  Suicidal:  None  Homicidal:  None  Hallucinations:  Not demonstrated today  Delusion:  Unable to demonstrate  Memory:  Unable to evaluate  Orientation:  Unable to evaluate  Reliability:  poor  Insight:  Poor  Judgement:  Poor  Impulse Control:  Poor  Physical/Medical Issues:  Yes hypothyroidism    Assessment/Plan   Problems Addressed this Visit     None      Visit Diagnoses     Intermittent explosive disorder in adult    -  Primary    Relevant Medications    QUEtiapine (SEROquel) 400 MG tablet    propranolol (INDERAL) 20 MG tablet    amphetamine-dextroamphetamine XR (ADDERALL XR) 15 MG 24 hr capsule    Attention deficit hyperactivity disorder (ADHD), combined type        Relevant Medications    QUEtiapine (SEROquel) 400 MG tablet    amphetamine-dextroamphetamine XR (ADDERALL XR) 15 MG 24 hr capsule    Delusional disorder (CMS/HCC)        Relevant Medications    QUEtiapine (SEROquel) 400 MG tablet    amphetamine-dextroamphetamine XR (ADDERALL XR) 15 MG 24 hr capsule    Insomnia due to other mental disorder        Relevant Medications    QUEtiapine (SEROquel) 400 MG tablet    amphetamine-dextroamphetamine XR (ADDERALL XR) 15 MG 24 hr capsule    Long-term use of high-risk medication        Relevant Orders    CBC & Differential    Comprehensive Metabolic Panel    Hemoglobin A1c    Lipid Panel    CBC Auto Differential      Diagnoses       Codes Comments    Intermittent explosive disorder in adult    -  Primary ICD-10-CM: F63.81  ICD-9-CM: 312.34     Attention deficit hyperactivity disorder (ADHD), combined type     ICD-10-CM: F90.2  ICD-9-CM: 314.01     Delusional disorder (CMS/HCC)     ICD-10-CM: F22  ICD-9-CM: 297.1     Insomnia due to other mental disorder     ICD-10-CM: F51.05, F99  ICD-9-CM: 300.9, 327.02     Long-term use of high-risk medication     ICD-10-CM: Z79.899  ICD-9-CM: V58.69           Functionality: pt having significant impairment in important areas of daily functioning. Lives in  residential housing.    Prognosis: Guarded dependent on medication/follow up and treatment plan compliance.    I am changing his adderall to XR version for the ADHD, continuing the inderal for anxiety and the seroquel for the delusions and IED.  Pt tried to urinate for UDs today but could not.  I am adding the drug screen to his blood work panel today.  I am obtaining labs today to assess for metabolic syndrome with long term use antipsychotics.  Staff will notify me should any problems develop.  He will RTC 3 months.                    This document has been electronically signed by TAHIR Galloway on   January 12, 2021 10:27 EST.       You can access the [facility] Patient Portal, offered by [facility], by registering with the following website: [email or web address]

## 2021-04-13 PROCEDURE — 99214 OFFICE O/P EST MOD 30 MIN: CPT | Mod: 95

## 2021-08-24 PROCEDURE — 99214 OFFICE O/P EST MOD 30 MIN: CPT | Mod: 95

## 2021-09-01 ENCOUNTER — APPOINTMENT (OUTPATIENT)
Dept: ORTHOPEDIC SURGERY | Facility: CLINIC | Age: 26
End: 2021-09-01
Payer: MEDICAID

## 2021-09-01 VITALS
HEIGHT: 64 IN | BODY MASS INDEX: 40.29 KG/M2 | DIASTOLIC BLOOD PRESSURE: 88 MMHG | WEIGHT: 236 LBS | SYSTOLIC BLOOD PRESSURE: 148 MMHG | HEART RATE: 88 BPM

## 2021-09-01 DIAGNOSIS — M22.2X1 PATELLOFEMORAL DISORDERS, RIGHT KNEE: ICD-10-CM

## 2021-09-01 PROCEDURE — 99213 OFFICE O/P EST LOW 20 MIN: CPT

## 2021-09-01 PROCEDURE — 73562 X-RAY EXAM OF KNEE 3: CPT | Mod: RT

## 2022-09-13 PROCEDURE — 99214 OFFICE O/P EST MOD 30 MIN: CPT | Mod: 95

## 2022-10-06 NOTE — DISCHARGE NOTE PROVIDER - NSDCHHHOMEBOUND_GEN_ALL_CORE
Fall risk Colchicine Pregnancy And Lactation Text: This medication is Pregnancy Category C and isn't considered safe during pregnancy. It is excreted in breast milk.

## 2022-11-21 ENCOUNTER — EMERGENCY (EMERGENCY)
Facility: HOSPITAL | Age: 27
LOS: 1 days | Discharge: HOME | End: 2022-11-21
Admitting: EMERGENCY MEDICINE
Payer: MEDICAID

## 2022-11-21 VITALS
RESPIRATION RATE: 18 BRPM | TEMPERATURE: 98 F | SYSTOLIC BLOOD PRESSURE: 112 MMHG | OXYGEN SATURATION: 100 % | HEART RATE: 77 BPM | DIASTOLIC BLOOD PRESSURE: 60 MMHG

## 2022-11-21 LAB
APPEARANCE UR: ABNORMAL
BACTERIA # UR AUTO: NEGATIVE — SIGNIFICANT CHANGE UP
BASOPHILS # BLD AUTO: 0.02 K/UL — SIGNIFICANT CHANGE UP (ref 0–0.2)
BASOPHILS NFR BLD AUTO: 0.2 % — SIGNIFICANT CHANGE UP (ref 0–2)
BILIRUB UR-MCNC: NEGATIVE — SIGNIFICANT CHANGE UP
COLOR SPEC: ABNORMAL
DIFF PNL FLD: ABNORMAL
EOSINOPHIL # BLD AUTO: 0 K/UL — SIGNIFICANT CHANGE UP (ref 0–0.5)
EOSINOPHIL NFR BLD AUTO: 0 % — SIGNIFICANT CHANGE UP (ref 0–6)
EPI CELLS # UR: 14 /HPF — HIGH (ref 0–5)
GLUCOSE UR QL: NEGATIVE — SIGNIFICANT CHANGE UP
HCT VFR BLD CALC: 39.1 % — SIGNIFICANT CHANGE UP (ref 34.5–45)
HGB BLD-MCNC: 12.1 G/DL — SIGNIFICANT CHANGE UP (ref 11.5–15.5)
HYALINE CASTS # UR AUTO: 4 /LPF — SIGNIFICANT CHANGE UP (ref 0–7)
IANC: 8.29 K/UL — HIGH (ref 1.8–7.4)
IMM GRANULOCYTES NFR BLD AUTO: 0.3 % — SIGNIFICANT CHANGE UP (ref 0–0.9)
KETONES UR-MCNC: ABNORMAL
LEUKOCYTE ESTERASE UR-ACNC: NEGATIVE — SIGNIFICANT CHANGE UP
LYMPHOCYTES # BLD AUTO: 0.92 K/UL — LOW (ref 1–3.3)
LYMPHOCYTES # BLD AUTO: 9.6 % — LOW (ref 13–44)
MCHC RBC-ENTMCNC: 28.9 PG — SIGNIFICANT CHANGE UP (ref 27–34)
MCHC RBC-ENTMCNC: 30.9 GM/DL — LOW (ref 32–36)
MCV RBC AUTO: 93.3 FL — SIGNIFICANT CHANGE UP (ref 80–100)
MONOCYTES # BLD AUTO: 0.28 K/UL — SIGNIFICANT CHANGE UP (ref 0–0.9)
MONOCYTES NFR BLD AUTO: 2.9 % — SIGNIFICANT CHANGE UP (ref 2–14)
NEUTROPHILS # BLD AUTO: 8.29 K/UL — HIGH (ref 1.8–7.4)
NEUTROPHILS NFR BLD AUTO: 87 % — HIGH (ref 43–77)
NITRITE UR-MCNC: NEGATIVE — SIGNIFICANT CHANGE UP
NRBC # BLD: 0 /100 WBCS — SIGNIFICANT CHANGE UP (ref 0–0)
NRBC # FLD: 0 K/UL — SIGNIFICANT CHANGE UP (ref 0–0)
PH UR: 6.5 — SIGNIFICANT CHANGE UP (ref 5–8)
PLATELET # BLD AUTO: 381 K/UL — SIGNIFICANT CHANGE UP (ref 150–400)
PROT UR-MCNC: ABNORMAL
RBC # BLD: 4.19 M/UL — SIGNIFICANT CHANGE UP (ref 3.8–5.2)
RBC # FLD: 15.7 % — HIGH (ref 10.3–14.5)
RBC CASTS # UR COMP ASSIST: >720 /HPF — HIGH (ref 0–4)
SP GR SPEC: 1.03 — SIGNIFICANT CHANGE UP (ref 1.01–1.05)
UROBILINOGEN FLD QL: ABNORMAL
WBC # BLD: 9.54 K/UL — SIGNIFICANT CHANGE UP (ref 3.8–10.5)
WBC # FLD AUTO: 9.54 K/UL — SIGNIFICANT CHANGE UP (ref 3.8–10.5)
WBC UR QL: 4 /HPF — SIGNIFICANT CHANGE UP (ref 0–5)

## 2022-11-21 PROCEDURE — 99284 EMERGENCY DEPT VISIT MOD MDM: CPT

## 2022-11-21 RX ORDER — FAMOTIDINE 10 MG/ML
20 INJECTION INTRAVENOUS ONCE
Refills: 0 | Status: COMPLETED | OUTPATIENT
Start: 2022-11-21 | End: 2022-11-21

## 2022-11-21 RX ADMIN — Medication 20 MILLIGRAM(S): at 23:11

## 2022-11-21 RX ADMIN — FAMOTIDINE 20 MILLIGRAM(S): 10 INJECTION INTRAVENOUS at 23:13

## 2022-11-21 RX ADMIN — Medication 30 MILLILITER(S): at 23:11

## 2022-11-21 NOTE — ED PROVIDER NOTE - PROGRESS NOTE DETAILS
THEO Sierra- Pt received at sign out pending CT scan. CT + for "0.3 x 0.2 cm left proximal ureteral stone with mild left hydroureteronephrosis, perinephric/renal stranding and delayed nephrogram". UA negative for infection. Labs wnl. Pt denies fevers, chills, nausea, vomiting. Pt tolerating PO. NCVAT b/l. Abdomen is soft and non-tender. Heart and lung sounds normal. Pt stable for DC home and outpatient f/u with urologist. Plan for dc on IBU and zofran ODT. Urology referral given. Pt and family at bedside aware of possible side effect and interactions of zofran and home medications. All questions and concerns addressed.

## 2022-11-21 NOTE — ED ADULT NURSE NOTE - OBJECTIVE STATEMENT
Received in intake 9 with c/o mid abdominal pain and nausea since morning. Not in acute distress. Alert and oriented x 4, safety maintained. 22G IV line inserted in left hand. Due labs sent. Meds given as per order.

## 2022-11-21 NOTE — ED PROVIDER NOTE - NSFOLLOWUPINSTRUCTIONS_ED_ALL_ED_FT
Patient advised to follow up with PRIMARY CARE DOCTOR IN 1-2 DAYS AND UROLOGIST WITHIN WEEK  and told to return to the emergency department immediately for any new or concerning symptoms such as FEVERS, CHILLS, PALPITATIONS, NAUSEA, VOMITING  OR ANY OTHER COMPLAINTS. Patient agrees with plan.    Rest, stay hydrated   Take motrin 600 mg every 6 hours as needed for pain   Can take zofran as needed for nausea or vomiting   urology clinic (call [phone] to make an appointment)      Advance activity as tolerated.  Continue all previously prescribed medications as directed unless otherwise instructed.  Follow up with your primary care physician in 48-72 hours- bring copies of your results.  Return to the ER for worsening or persistent symptoms, and/or ANY NEW OR CONCERNING SYMPTOMS. If you have issues obtaining follow up, please call: [phone] to obtain a doctor or specialist who takes your insurance in your area.  You may call [phone] to make an appointment with the internal medicine clinic.

## 2022-11-21 NOTE — ED PROVIDER NOTE - PATIENT PORTAL LINK FT
You can access the FollowMyHealth Patient Portal offered by [facility] by registering at the following website: [email or web address]. By joining [employer]’s FollowMyHealth portal, you will also be able to view your health information using other applications (apps) compatible with our system.

## 2022-11-21 NOTE — ED PROVIDER NOTE - CLINICAL SUMMARY MEDICAL DECISION MAKING FREE TEXT BOX
27-year-old female past medical history of autism, presenting with a complaint of generalized abdominal pain times few hours associated with nausea.  Patient is well-appearing, vitals are stable, no tenderness appreciated on exam.  Plan for routine labs, including UA, trial of Pepcid, Maalox, Bentyl.  Will review labs, reassess and dispo. imaging not clinically warranted at this time.

## 2022-11-21 NOTE — ED PROVIDER NOTE - OBJECTIVE STATEMENT
Patient is a 27-year-old female with a past medical history of GERD, presenting with a complaint of generalized abdominal pain times few hours prior to arrival.  Pain is sharp associated with nausea.  No vomiting, diarrhea, constipation, urinary symptoms, fever, chills, chest pain shortness of breath.  She denies food as possible inciting factor, sick contacts recent travel Patient is a 27-year-old female with a past medical history of GERD, autism presenting with a complaint of generalized abdominal pain times few hours prior to arrival.  Pain is sharp associated with nausea.  No vomiting, diarrhea, constipation, urinary symptoms, fever, chills, chest pain shortness of breath.  She denies food as possible inciting factor, sick contacts recent travel

## 2022-11-21 NOTE — ED PROVIDER NOTE - NSICDXPASTMEDICALHX_GEN_ALL_CORE_FT
PULMONARY CONSULT    2020 11:00 am    Inpatient consult to Pulmonology  Consult performed by: Jose Pepe MD  Consult ordered by: Coltenromain Key         Accompanied by grandmother. Mother on Video call    CC: Request to wean off home O2 for PMH of BPD    HPI: Beck is a 8 month old male, complex PMH including prematurity at 23wga, BPD with baseline O2 requirement, s/p PDA ligation, and extensive GI history including SB perforation, ostomy creation and reversal who is admitted with incarcerated SB inguinal hernia s/p resection and repair during this admission on 10/14. He is now POD 4 from surgery and has been stable on 0.2L of low flow O2.   Per mom, he was sent home from the NICU on 0.5L of oxygen and HCTZ. His O2 has slowly been weaned to 0.2L. He does not have respiratory distress at baseline, and no increased work of breathing. In terms of his baseline, there is no coughing or wheezing,he does not have any watery eyes or rhinorrhea. He does not snore.      REVIEW OF SYSTEMS: Systems reviewed and negative unless stated above.    PAST MEDICAL HISTORY:   Past Medical History:   Diagnosis Date   • Extreme prematurity 2020   • NEC (necrotizing enterocolitis) (CMS/[facility])      s/p intestinal perforation s/p resection (2020)   • ROP (retinopathy prematurity), unspecified laterality        SURGICAL HISTORY:   Past Surgical History:   Procedure Laterality Date   • Circumcision, non-  2020    performed concurrently with inguinal hernia repair   • Exploratory of abdomen  2020    EXPLORATORY LAPAROTOMY WITH LYSIS OF ADHESIONS, REDUCTION OF PROLAPSED JEJUNOSTOMY, AND CLOSURE OF JEJUNOSTOMY AND MUCOUS FISTULA WITH PRIMARY ANASTAMOSIS  ([facility])   • Ileostomy closure  2020    performed at [facility]   • Inguinal hernia repair  2020    performed concurrently with circumcision ([facility])       FAMILY HISTORY:   family history is not on file.    SOCIAL HISTORY:    Lives at home with  family.    INPATIENT MEDICATIONS:  Current Facility-Administered Medications   Medication   • acetaminophen (TYLENOL) rectal suppository 90 mg   • dextrose 5% / sodium chloride 0.9% 1,000 mL with potassium CHLORIDE 10 mEq infusion   • hydroCHLOROthiazide 5 MG/ML (compounded) suspension 8 mg   • influenza virus quadrivalent vaccine inactivated (PRESERVATIVE FREE) injection 0.5 mL   • lidocaine (ANECREAM/LMX) 4 % cream 1 application   • [MAR Hold] pediatric multivitamin (POLY-VI-SOL) oral solution 1 mL   • [MAR Hold] sodium chloride 4 mEq/mL oral solution 13.2 mEq       HOME MEDICATIONS:  Prior to Admission medications    Medication Sig Start Date End Date Taking? Authorizing Provider   hydroCHLOROthiazide 5 MG/ML (compounded) suspension Take 8 mg by mouth 2 times daily. [facility]: Contact [facility] for compounding recipe, if needed.   Yes Outside Provider   Multiple Vitamin (MULTIVITAMIN PO) Oral drops   Yes Outside Provider   sodium chloride 4 mEq/mL oral solution Take 13.2 mEq by mouth 2 times daily.   Yes Outside Provider       ALLERGIES:   ALLERGIES:  Patient has no known allergies.     IMMUNIZATIONS:   Immunization History   Administered Date(s) Administered   • None   Deferred Date(s) Deferred   • Influenza, injectable, quadrivalent, preservative-free 2020          VITAL SIGNS:  Visit Vitals  BP (!) 97/64 (BP Location: RLE - Right lower extremity)   Pulse 106   Temp 98.1 °F (36.7 °C) (Temporal)   Resp 40   Ht 22.84\" (58 cm)   Wt 6.475 kg (14 lb 4.4 oz)   SpO2 100%   BMI 19.25 kg/m²     Growth percentiles:   <1 %ile (Z= -5.77) based on WHO (Boys, 0-2 years) Length-for-age data based on Length recorded on 2020.  <1 %ile (Z= -2.69) based on WHO (Boys, 0-2 years) weight-for-age data using vitals from 2020.      Intake/Output Summary (Last 24 hours) at 2020 2103  Last data filed at 2020 1859  Gross per 24 hour   Intake 801.5 ml   Output 453 ml   Net 348.5 ml         Vent Settings  Last Value   FiO2 100 % (10/19/20 0712)   Mode     Rate     Tidal Volume     Pressure Support     PEEP/CPAC/EPAP         PHYSICAL EXAM  General: In NAD  Skin: No eczema, rashes noted   HEENT: Anterior fontanelle is flat, NGT in place, NCAT, normal nasal mucosa, Neck supple  Chest: Nontender chest wall, normal chest wall anatomy and symmetry, good aeration in all lung fields, no retractions, wheezing, crackles  CV: RRR, no murmurs noted, capillary refill  <2 seconds  Abdomen: soft, non-tender, non-distended, no masses  Ext: No cyanosis, clubbing, edema  Neuro: Reflexes 2+/2+ bilaterally      IMAGING: Reviewed.  XR CHEST PA OR AP 1 VIEW   Final Result   1. Prominence of the central lung markings may be related to atelectasis from low lung volumes. Airspace disease or viral or reactive small airways disease are not excluded.   2. Gaseous distention of loops of bowel about the abdomen. Ileus or enteritis are not excluded.      Electronically Signed by: RONN KEYS M.D.   Signed on: 2020 09:27 PM      FL UPPER GI AND SMALL BOWEL SINGLE CONTRAST   Final Result   Impression:      Nonspecific dilated small bowel loops, without complete obstruction.  Contrast reaches the colon within three hours of the exam.  Contrast is noted to enter small bowel within a right inguinal hernia.      Gastroesophageal reflux during the exam.      Electronically Signed by: SAEID LARIOS M.D.    Signed on: 2020 3:40 PM                LABS:   Recent Results (from the past 24 hour(s))   BLOOD GAS CAP-RC    Collection Time: 10/19/20 12:42 PM   Result Value Ref Range    PH RC Capillary 7.40 7.35 - 7.45 Units    PCO2 RC Capillary 48 35 - 48 mm Hg    PO2 RC Capillary 43 34 - 45 mm Hg    HCO3 RC Capillary 30 (H) 22 - 28 mmol/L    Base Excess RC Capillary 4 (H) 0 - 3 mmol/L    O2 SAT RC Capillary 79 (L) >95 %    Temperature RC 37.0 degrees    Site RC CAPILLARY     CONDITION RC 0.2 L %    Potassium (performed by respiratory)     Collection Time: 10/19/20 12:42 PM   Result Value Ref Range    Potassium RC 4.5 3.5 - 6.0 mmol/L   DRVVT CONFIRM    Collection Time: 10/19/20 12:42 PM   Result Value Ref Range    Sodium  135 - 145 mmol/L   IONIZED CALCIUM-RC    Collection Time: 10/19/20 12:42 PM   Result Value Ref Range    CALCIUM IONIZED RC 1.37 (H) 1.15 - 1.29 mmol/L         ASSESSMENT:  Ibrahima \"Yakima\" is an 8 month old male, ex-23 weeker with BPD requiring home oxygen when discharged from the NICU, now POD 4 from a small bowel resection and primary anastomosis. He has since been weaned from 0.5L to 0.2L without significant changes in his SpO2 and work of breathing. Recent ECHO completed 9/22 at [facility] showed small residual ductal shunt vs small AP collateral vessel without any pulmonary hypertension and most recent VBG (10/5) showed slightly elevated CO2 at 37.  We will reassess his CO2 status via CBG and wean from 0.2L of O2 to RA if CO2 is within appropriate range.     PLAN:  - Please obtain CBG to determine CO2 retention, if value is appropriate (high 40s), can slow wean off O2 to RA  - Recommend obtaining an initial CBG after weaned off O2 and again a 3-4 days later.    Prognosis and treatment plan d/w Dr. Pepe and hospitalist team in detail. Addendum to follow.    Myriam Martinez, PGY-1  [employer]   [facility]    I have personally interviewed, seen and examined the patient, discussed with resident and agree with the findings and plan as documented. Counseled the family in regards to next steps. Plan was discussed with the team in detail.    Jose Pepe MD  2020 9:04 PM       PAST MEDICAL HISTORY:  Autism     Autism     Schizo-affective psychosis     Schizophrenia

## 2022-11-22 VITALS
TEMPERATURE: 98 F | HEART RATE: 69 BPM | SYSTOLIC BLOOD PRESSURE: 123 MMHG | RESPIRATION RATE: 18 BRPM | DIASTOLIC BLOOD PRESSURE: 79 MMHG | OXYGEN SATURATION: 100 %

## 2022-11-22 LAB
ALBUMIN SERPL ELPH-MCNC: 4.7 G/DL — SIGNIFICANT CHANGE UP (ref 3.3–5)
ALP SERPL-CCNC: 66 U/L — SIGNIFICANT CHANGE UP (ref 40–120)
ALT FLD-CCNC: 5 U/L — SIGNIFICANT CHANGE UP (ref 4–33)
ANION GAP SERPL CALC-SCNC: 15 MMOL/L — HIGH (ref 7–14)
AST SERPL-CCNC: 12 U/L — SIGNIFICANT CHANGE UP (ref 4–32)
BILIRUB SERPL-MCNC: 0.2 MG/DL — SIGNIFICANT CHANGE UP (ref 0.2–1.2)
BUN SERPL-MCNC: 9 MG/DL — SIGNIFICANT CHANGE UP (ref 7–23)
CALCIUM SERPL-MCNC: 9.4 MG/DL — SIGNIFICANT CHANGE UP (ref 8.4–10.5)
CHLORIDE SERPL-SCNC: 104 MMOL/L — SIGNIFICANT CHANGE UP (ref 98–107)
CO2 SERPL-SCNC: 22 MMOL/L — SIGNIFICANT CHANGE UP (ref 22–31)
CREAT SERPL-MCNC: 0.93 MG/DL — SIGNIFICANT CHANGE UP (ref 0.5–1.3)
EGFR: 85 ML/MIN/1.73M2 — SIGNIFICANT CHANGE UP
GLUCOSE SERPL-MCNC: 113 MG/DL — HIGH (ref 70–99)
HCG SERPL-ACNC: <5 MIU/ML — SIGNIFICANT CHANGE UP
LIDOCAIN IGE QN: 22 U/L — SIGNIFICANT CHANGE UP (ref 7–60)
POTASSIUM SERPL-MCNC: 3.9 MMOL/L — SIGNIFICANT CHANGE UP (ref 3.5–5.3)
POTASSIUM SERPL-SCNC: 3.9 MMOL/L — SIGNIFICANT CHANGE UP (ref 3.5–5.3)
PROT SERPL-MCNC: 8.1 G/DL — SIGNIFICANT CHANGE UP (ref 6–8.3)
SODIUM SERPL-SCNC: 141 MMOL/L — SIGNIFICANT CHANGE UP (ref 135–145)

## 2022-11-22 PROCEDURE — 74177 CT ABD & PELVIS W/CONTRAST: CPT | Mod: 26,MA

## 2022-11-22 RX ORDER — IBUPROFEN 200 MG
1 TABLET ORAL
Qty: 28 | Refills: 0
Start: 2022-11-22 | End: 2022-11-28

## 2022-11-22 RX ORDER — ONDANSETRON 8 MG/1
1 TABLET, FILM COATED ORAL
Qty: 9 | Refills: 0
Start: 2022-11-22 | End: 2022-11-24

## 2022-11-22 RX ORDER — SODIUM CHLORIDE 9 MG/ML
1000 INJECTION INTRAMUSCULAR; INTRAVENOUS; SUBCUTANEOUS ONCE
Refills: 0 | Status: COMPLETED | OUTPATIENT
Start: 2022-11-22 | End: 2022-11-22

## 2022-11-22 RX ADMIN — SODIUM CHLORIDE 1000 MILLILITER(S): 9 INJECTION INTRAMUSCULAR; INTRAVENOUS; SUBCUTANEOUS at 01:18

## 2022-11-23 ENCOUNTER — APPOINTMENT (OUTPATIENT)
Dept: UROLOGY | Facility: CLINIC | Age: 27
End: 2022-11-23

## 2022-11-23 VITALS
SYSTOLIC BLOOD PRESSURE: 108 MMHG | HEART RATE: 76 BPM | DIASTOLIC BLOOD PRESSURE: 70 MMHG | BODY MASS INDEX: 36.02 KG/M2 | OXYGEN SATURATION: 100 % | WEIGHT: 211 LBS | HEIGHT: 64 IN | TEMPERATURE: 98.7 F

## 2022-11-23 DIAGNOSIS — N13.30 UNSPECIFIED HYDRONEPHROSIS: ICD-10-CM

## 2022-11-23 DIAGNOSIS — Z78.9 OTHER SPECIFIED HEALTH STATUS: ICD-10-CM

## 2022-11-23 DIAGNOSIS — F84.0 AUTISTIC DISORDER: ICD-10-CM

## 2022-11-23 DIAGNOSIS — F25.9 SCHIZOAFFECTIVE DISORDER, UNSPECIFIED: ICD-10-CM

## 2022-11-23 DIAGNOSIS — R31.0 GROSS HEMATURIA: ICD-10-CM

## 2022-11-23 DIAGNOSIS — N20.1 CALCULUS OF URETER: ICD-10-CM

## 2022-11-23 DIAGNOSIS — Z84.1 FAMILY HISTORY OF DISORDERS OF KIDNEY AND URETER: ICD-10-CM

## 2022-11-23 DIAGNOSIS — N20.0 CALCULUS OF KIDNEY: ICD-10-CM

## 2022-11-23 PROCEDURE — 99204 OFFICE O/P NEW MOD 45 MIN: CPT

## 2022-11-23 RX ORDER — ARIPIPRAZOLE 5 MG/1
5 TABLET ORAL
Qty: 30 | Refills: 0 | Status: ACTIVE | COMMUNITY
Start: 2022-10-11

## 2022-11-23 RX ORDER — IBUPROFEN 600 MG/1
600 TABLET, FILM COATED ORAL
Qty: 28 | Refills: 0 | Status: ACTIVE | COMMUNITY
Start: 2022-11-22

## 2022-11-23 RX ORDER — HALOPERIDOL 10 MG/1
10 TABLET ORAL
Refills: 0 | Status: ACTIVE | COMMUNITY

## 2022-11-23 RX ORDER — FLUTICASONE PROPIONATE 50 UG/1
50 SPRAY, METERED NASAL
Qty: 16 | Refills: 0 | Status: ACTIVE | COMMUNITY
Start: 2022-10-28

## 2022-11-23 RX ORDER — ONDANSETRON 4 MG/1
4 TABLET, ORALLY DISINTEGRATING ORAL
Qty: 9 | Refills: 0 | Status: ACTIVE | COMMUNITY
Start: 2022-11-22

## 2022-11-23 RX ORDER — ARIPIPRAZOLE 5 MG/1
5 TABLET ORAL
Refills: 0 | Status: ACTIVE | COMMUNITY

## 2022-11-23 NOTE — HISTORY OF PRESENT ILLNESS
[FreeTextEntry1] : MIKA SMALLWOOD is a 27 year old F who presents with first stone attack recently: went to ED 2 nights ago.  Had pain, n/v and Gross hematuria. No bacteria present and no WBC. Cr was 0.93 and GFR 85 ml/min\par \par UA with >400 RBC's but also had her period at the time and she tends to get very heavy periods.\par saw Gyn: no fibroids and normal PAP's though currently not sexually active. She has been in the past.\par \par has chronic constipation:  \par 11/22/22 CT with IV contrast - 2 mm prox ureteral stone x 3mm w delayed Left nephrogram\par \par Pt with normal HgBA1c and uric acid (5.5) in 4/21\par \par No STD's or UTI in past\par No known h/o childhood UTI\par

## 2022-11-23 NOTE — LETTER BODY
[Dear  ___] : Dear  [unfilled], [Consult Letter:] : I had the pleasure of evaluating your patient, [unfilled]. [Please see my note below.] : Please see my note below. [Consult Closing:] : Thank you very much for allowing me to participate in the care of this patient.  If you have any questions, please do not hesitate to contact me. [FreeTextEntry1] : Please see my note> I will keep you informed. [FreeTextEntry3] : Sincerely,\par \par Paula Sharma MD\par Clinical \par [facility]\par [facility]\par

## 2022-11-23 NOTE — REVIEW OF SYSTEMS
[Eyesight Problems] : eyesight problems [Abdominal Pain] : abdominal pain [Date of last menstrual period ____] : date of last menstrual period: [unfilled] [Told you have blood in urine on a urine test] : told blood was present in a urine test [Anxiety] : anxiety [Depression] : depression [Negative] : Heme/Lymph

## 2022-11-23 NOTE — ASSESSMENT
[FreeTextEntry1] : Explained all of above and reviewed hospital records and the CT scan with patient and mother. Also explained that though her cr and GFR are "normal", the GFR has dropped c/w her labs in 4/22 from PCP.  Though I do believe her vomiting and significant abdominal pain may have come from gastritis, as there were findings of a "prominent gastric wall which could be from gastritis", and she states she felt better after vomiting green, the fact that there is a delayed nephrogram in setting of no left flank pain, suggests chronic obstruction from this ureteral stone, which is causing hydronephrosis and requires treatment.\par \par Reviewed ureteroscopy, ureteral stent placement and laser lithotripsy, as well as fact that sometimes it must be staged if you can't "fit" up the ureter first time around.\par \par Lastly, pt states she believes the blood in toilet was from her heavy period that day. Either way, we will be in her bladder cystoscopically for the procedure to evaluate bladder.\par \par Only culture sent today as she only had enough urine for culture.\par will book procedure.\par \par Urged to go right back to ED if n/v/f/c or rigors, sweats, etc.\par \par Also, told to work on her bowels as she c/o constipation

## 2022-11-24 LAB
CULTURE RESULTS: SIGNIFICANT CHANGE UP
SPECIMEN SOURCE: SIGNIFICANT CHANGE UP

## 2022-11-25 LAB
APPEARANCE: ABNORMAL
BACTERIA: NEGATIVE
BILIRUBIN URINE: NEGATIVE
BLOOD URINE: ABNORMAL
COLOR: YELLOW
GLUCOSE QUALITATIVE U: NEGATIVE
HYALINE CASTS: 0 /LPF
KETONES URINE: NEGATIVE
LEUKOCYTE ESTERASE URINE: ABNORMAL
MICROSCOPIC-UA: NORMAL
NITRITE URINE: NEGATIVE
PH URINE: 6.5
PROTEIN URINE: ABNORMAL
RED BLOOD CELLS URINE: 224 /HPF
SPECIFIC GRAVITY URINE: 1.02
SQUAMOUS EPITHELIAL CELLS: 16 /HPF
UROBILINOGEN URINE: NORMAL
WHITE BLOOD CELLS URINE: 23 /HPF

## 2022-11-26 LAB — BACTERIA UR CULT: NORMAL

## 2022-12-20 ENCOUNTER — NON-APPOINTMENT (OUTPATIENT)
Age: 27
End: 2022-12-20

## 2023-01-01 NOTE — ED PROVIDER NOTE - NS ED MD DISPO DISCHARGE CCDA
"9 m.o. WELL CHILD CHECKUP    Sussy Harrington is a 9 m.o. female who presents to the office today with both parents for routine health care examination.    Left ear infection 3 days ago - completing amoxicillin     SUBJECTIVE  Concerns: No     PMH: History reviewed. No pertinent past medical history.  PSH: History reviewed. No pertinent surgical history.  FH: No family history on file.  SH: Lives with mother, father, brother  : No   Sleep: crib    ROS:   Nutrition: bottle - Enfamil Gentlease;     Pureed fruits/vegetables: Yes;     Meats: Yes    ;  Peanut butter:   Yes   Voiding and Stooling concerns:  No     Development:      2023     9:09 AM 2023    11:52 AM 2023     1:29 PM 2023    11:12 AM   Survey of Wellbeing of Young Children Milestones   Makes sounds that let you know he or she is happy or upset    Not Yet   Seems happy to see you    Not Yet   Follows a moving toy with his or her eyes    Somewhat   Turns head to find the person who is talking    Somewhat   Holds head steady when being pulled up to a sitting position    Somewhat   Brings hands together    Very Much   Laughs    Not Yet   Keeps head steady when held in a sitting position    Somewhat   Makes sounds like "ga," "ma," or "ba"    Not Yet   Looks when you call his or her name    Not Yet   2-Month Developmental Score Incomplete Incomplete Incomplete 6   Holds head steady when being pulled up to a sitting position   Very Much    Brings hands together   Very Much    Laughs   Very Much    Keeps head steady when held in a sitting position   Very Much    Makes sounds like "ga,"  "ma," or "ba"      Very Much    Looks when you call his or her name   Very Much    Rolls over    Not Yet    Passes a toy from one hand to the other   Very Much    Looks for you or another caregiver when upset   Very Much    Holds two objects and bangs them together   Not Yet    4-Month Developmental Score Incomplete Incomplete 16 Incomplete   Makes sounds " "like "ga", "ma", or "ba"  Somewhat     Looks when you call his or her name  Very Much     Rolls over  Very Much     Passes a toy from one hand to the other  Very Much     Looks for you or another caregiver when upset  Very Much     Holds two objects and bangs them together  Very Much     Holds up arms to be picked up  Somewhat     Gets to a sitting position by him or herself  Somewhat     Picks up food and eats it  Somewhat     Pulls up to standing  Somewhat     6-Month Developmental Score Incomplete 15 Incomplete Incomplete   Holds up arms to be picked up Very Much      Gets to a sitting position by him or herself Very Much      Picks up food and eats it Very Much      Pulls up to standing Very Much      Plays games like "peek-a-barker" or "pat-a-cake" Very Much      Calls you "mama" or "sky" or similar name Somewhat      Looks around when you say things like "Where's your bottle?" or "Where's your blanket?" Very Much      Copies sounds that you make Somewhat      Walks across a room without help Not Yet      Follows directions - like "Come here" or "Give me the ball" Somewhat      9-Month Developmental Score 15 Incomplete Incomplete Incomplete   12-Month Developmental Score Incomplete Incomplete Incomplete Incomplete   15-Month Developmental Score Incomplete Incomplete Incomplete Incomplete   18-Month Developmental Score Incomplete Incomplete Incomplete Incomplete   24-Month Developmental Score Incomplete Incomplete Incomplete Incomplete   30-Month Developmental Score Incomplete Incomplete Incomplete Incomplete   36-Month Developmental Score Incomplete Incomplete Incomplete Incomplete   48-Month Developmental Score Incomplete Incomplete Incomplete Incomplete   60-Month Developmental Score Incomplete Incomplete Incomplete Incomplete           OBJECTIVE:   64 %ile (Z= 0.36) based on WHO (Girls, 0-2 years) weight-for-age data using vitals from 2023.  55 %ile (Z= 0.13) based on WHO (Girls, 0-2 years) " Length-for-age data based on Length recorded on 2023.    PHYSICAL  GENERAL: WDWN female  HEAD: anterior fontanelle open 1cm, soft, flat  EYES: + red reflex b/l, normal eye alignment  EARS: left Tm with mild opaque effusion, right TM gray, normal EAC's bilat without excessive cerumen  VISION and HEARING: Subjective Normal.  NOSE: nasal passages clear  OP: OP clear  NECK: supple, no masses, no lymphadenopathy, no thyroid prominence  RESP: clear to auscultation bilaterally, no wheezes or rhonchi  CV: RRR, normal S1/S2, no murmurs;  2+ brachial pulses, 2+ femoral pulses  ABD: soft, nontender, no masses, no hepatosplenomegaly  : normal female exam, Matt I  MS: No torticollis, FROM all joints and symmetric, no hip click/clunk to Atkinson/Ortalani   SKIN: no rashes or lesions  NEURO: normal tone and strength    ASSESSMENT:   Well Child    PLAN:   Sussy was seen today for well child.    Diagnoses and all orders for this visit:    Encounter for well child check without abnormal findings        Normal growth and development  Immunizations UTD - offered influenza  If  - continue vitamin D   Feeding and sleep advice given  Resolving Aom - complete course of amoxicillin    Anticipatory Guidance:  - limit word no  - no Television  - self feeding  - no bottle in bed  -  Brush teeth  - safety: car seat, falls, watery safety    Follow up as needed.  Return for 12 month  well visit.     Patient/Caregiver provided printed discharge information.

## 2023-03-13 PROCEDURE — ZZZZZ: CPT

## 2023-06-12 PROCEDURE — 99213 OFFICE O/P EST LOW 20 MIN: CPT | Mod: 95

## 2023-07-24 PROCEDURE — 99214 OFFICE O/P EST MOD 30 MIN: CPT | Mod: 95

## 2023-11-23 ENCOUNTER — EMERGENCY (EMERGENCY)
Facility: HOSPITAL | Age: 28
LOS: 1 days | Discharge: HOME | End: 2023-11-23
Attending: EMERGENCY MEDICINE | Admitting: EMERGENCY MEDICINE
Payer: MEDICAID

## 2023-11-23 VITALS
OXYGEN SATURATION: 100 % | TEMPERATURE: 98 F | HEART RATE: 92 BPM | SYSTOLIC BLOOD PRESSURE: 111 MMHG | RESPIRATION RATE: 16 BRPM | DIASTOLIC BLOOD PRESSURE: 96 MMHG

## 2023-11-23 LAB
FLUAV AG NPH QL: SIGNIFICANT CHANGE UP
FLUAV AG NPH QL: SIGNIFICANT CHANGE UP
FLUBV AG NPH QL: SIGNIFICANT CHANGE UP
FLUBV AG NPH QL: SIGNIFICANT CHANGE UP
RSV RNA NPH QL NAA+NON-PROBE: SIGNIFICANT CHANGE UP
RSV RNA NPH QL NAA+NON-PROBE: SIGNIFICANT CHANGE UP
SARS-COV-2 RNA SPEC QL NAA+PROBE: SIGNIFICANT CHANGE UP
SARS-COV-2 RNA SPEC QL NAA+PROBE: SIGNIFICANT CHANGE UP

## 2023-11-23 PROCEDURE — 71046 X-RAY EXAM CHEST 2 VIEWS: CPT | Mod: 26

## 2023-11-23 PROCEDURE — 99284 EMERGENCY DEPT VISIT MOD MDM: CPT

## 2023-11-23 PROCEDURE — 93010 ELECTROCARDIOGRAM REPORT: CPT

## 2023-11-23 NOTE — ED ADULT NURSE NOTE - OBJECTIVE STATEMENT
29 y/o F arrives to E.D. intake area c/o episode of "choking sensation" this AM. Endorses similar sensation in the past, worsened by "dry air." PMHx: autism, schizoaffective disorder. Pt is a&ox4, ambulatory, neg SOB, tolerating secretions, airway patent, denies CP, neg N/V/D, denies fever/cough/body aches. Denies complaints currently. Frequent rounding in place.

## 2023-11-23 NOTE — ED PROVIDER NOTE - CLINICAL SUMMARY MEDICAL DECISION MAKING FREE TEXT BOX
29 yo F PMH Autism, Schizoaffective Disorder p/w sob after having a single choking episode this morning. Reports dry throat with cold air in room. Reports compliance with Abilify and Haldol. Episode happened 2 hours after eating.  No sinus tenderness, no lymph node swelling. Denies chest pain, current sob, abdominal pain, fever, chills, pelvic pressure or pain.   COVID with Flu, Chest Xray, HCG  Dry mouth due to psych medications vs. URI  Dispo Home

## 2023-11-23 NOTE — ED PROVIDER NOTE - OBJECTIVE STATEMENT
29 yo F PMH Autism, Schizoaffective Disorder p/w sob after having a single choking episode this morning. Reports dry throat with cold air in room. Reports compliance with Abilify and Haldol. Episode happened 2 hours after eating. No sinus tenderness, no lymph node swelling. Denies chest pain, current sob, abdominal pain, fever, chills, pelvic pressure or pain.

## 2023-11-23 NOTE — ED PROVIDER NOTE - ATTENDING CONTRIBUTION TO CARE
I performed a face to face evaluation of this patient and performed a full history and physical examination on the patient.  I agree with the resident's history, physical examination, and plan of the patient.  Pt c/o per mother brief dry cough episode, no color change lasted briefly no more than a minute, was fine after- has occurred before. Was not eating.    Pt in nad well appearing,    Lungs cta heart wnl    abd softnontender, and OP wnl    Pt with SDH because of psychiatric issues

## 2023-11-23 NOTE — ED PROVIDER NOTE - PATIENT PORTAL LINK FT
You can access the FollowMyHealth Patient Portal offered by [facility] by registering at the following website: [email or web address]. By joining [facility]’s FollowMyHealth portal, you will also be able to view your health information using other applications (apps) compatible with our system.

## 2023-11-23 NOTE — ED ADULT NURSE NOTE - NSFALLUNIVINTERV_ED_ALL_ED
Bed/Stretcher in lowest position, wheels locked, appropriate side rails in place/Call bell, personal items and telephone in reach/Instruct patient to call for assistance before getting out of bed/chair/stretcher/Non-slip footwear applied when patient is off stretcher/[place]iver to call system/Physically safe environment - no spills, clutter or unnecessary equipment/Purposeful proactive rounding/Room/bathroom lighting operational, light cord in reach

## 2023-11-23 NOTE — ED PROVIDER NOTE - NSFOLLOWUPINSTRUCTIONS_ED_ALL_ED_FT
Aripiprazole Tablets  What is this medication?  ARIPIPRAZOLE (ay ri PIP ray zolleonor) treats schizophrenia, bipolar I disorder, autism spectrum disorder, and Tourette disorder. It may also be used with antidepressant medications to treat depression. It works by balancing the levels of dopamine and serotonin in the brain, hormones that help regulate mood, behaviors, and thoughts. It belongs to a group of medications called antipsychotics. Antipsychotics can be used to treat several kinds of mental health conditions.    This medicine may be used for other purposes; ask your health care provider or pharmacist if you have questions.    COMMON BRAND NAME(S): Abilify    What should I tell my care team before I take this medication?  They need to know if you have any of these conditions:    Dementia  Diabetes  Difficulty swallowing  Have trouble controlling your muscles  Heart disease  History of irregular heartbeat  History of stroke  Low blood cell levels (white cells, red cells, and platelets)  Low blood pressure  Parkinson disease  Seizures  Suicidal thoughts, plans, or attempt by you or a family member  Urges to engage in impulsive behaviors in ways that are unusual for you  An unusual or allergic reaction to aripiprazole, other medications, foods, dyes, or preservatives  Pregnant or trying to get pregnant  Breastfeeding  How should I use this medication?  Take this medication by mouth with a glass of water. Take it as directed on the prescription label at the same time every day. You can take it with or without food. If it upsets your stomach, take it with food. Do not take your medication more often than directed. Keep taking it unless your care team tells you to stop.    A special MedGuide will be given to you by the pharmacist with each prescription and refill. Be sure to read this information carefully each time.    Talk to your care team about the use of this medication in children. While it may be prescribed for children as young as 6 years for selected conditions, precautions do apply.    Overdosage: If you think you have taken too much of this medicine contact a poison control center or emergency room at once.    NOTE: This medicine is only for you. Do not share this medicine with others.    What if I miss a dose?  If you miss a dose, take it as soon as you can. If it is almost time for your next dose, take only that dose. Do not take double or extra doses.    What may interact with this medication?  Do not take this medication with any of the following:    Brexpiprazole  Cisapride  Dextromethorphan; quinidine  Dronedarone  Metoclopramide  Pimozide  Quinidine  Thioridazine  This medication may also interact with the following:    Antihistamines for allergy, cough, and cold  Carbamazepine  Certain medications for anxiety or sleep  Certain medications for depression, such as amitriptyline, fluoxetine, paroxetine, or sertraline  Certain medications for fungal infections, such as fluconazole, itraconazole, ketoconazole, posaconazole, or voriconazole  Clarithromycin  General anesthetics, such as halothane, isoflurane, methoxyflurane, or propofol  Medications for Parkinson disease, such as levodopa  Medications for blood pressure  Medications for seizures  Medications that relax muscles for surgery  Opioid medications for pain  Other medications that cause heart rhythm changes  Phenothiazines, such as chlorpromazine or prochlorperazine  Rifampin  This list may not describe all possible interactions. Give your health care provider a list of all the medicines, herbs, non-prescription drugs, or dietary supplements you use. Also tell them if you smoke, drink alcohol, or use illegal drugs. Some items may interact with your medicine.    What should I watch for while using this medication?  Visit your care team for regular checks on your progress. Tell your care team if your symptoms do not start to get better or if they get worse. Do not suddenly stop taking this medication. You may develop a severe reaction. Your care team will tell you how much medication to take. If your care team wants you to stop the medication, the dose may be slowly lowered over time to avoid any side effects.    Patients and their families should watch out for new or worsening depression or thoughts of suicide. Also watch out for sudden changes in feelings such as feeling anxious, agitated, panicky, irritable, hostile, aggressive, impulsive, severely restless, overly excited and hyperactive, or not being able to sleep. If this happens, especially at the beginning of antidepressant treatment or after a change in dose, call your care team.    This medication may affect your coordination, reaction time, or judgment. Do not drive or operate machinery until you know how this medication affects you. Sit up or stand slowly to reduce the risk of dizzy or fainting spells. Drinking alcohol with this medication can increase the risk of these side effects.    This medication can cause problems with controlling your body temperature. It can lower the response of your body to cold temperatures. If possible, stay indoors during cold weather. If you must go outdoors, wear warm clothes. It can also lower the response of your body to heat. Do not overheat. Do not over-exercise. Stay out of the sun when possible. If you must be in the sun, wear cool clothing. Drink plenty of water. If you have trouble controlling your body temperature, call your care team right away.    This medication may cause dry eyes and blurred vision. If you wear contact lenses, you may feel some discomfort. Lubricating eye drops may help. See your care team if the problem does not go away or is severe.    This medication may increase blood sugar. Ask your care team if changes in diet or medications are needed if you have diabetes.    There have been reports of increased sexual urges or other strong urges such as gambling while taking this medication. If you experience any of these while taking this medication, you should report this to your care team as soon as possible.    What side effects may I notice from receiving this medication?  Side effects that you should report to your care team as soon as possible:    Allergic reactions—skin rash, itching, hives, swelling of the face, lips, tongue, or throat  High blood sugar (hyperglycemia)—increased thirst or amount of urine, unusual weakness or fatigue, blurry vision  High fever, stiff muscles, increased sweating, fast or irregular heartbeat, and confusion, which may be signs of neuroleptic malignant syndrome  Low blood pressure—dizziness, feeling faint or lightheaded, blurry vision  Pain or trouble swallowing  Prolonged or painful erection  Seizures  Stroke—sudden numbness or weakness of the face, arm, or leg, trouble speaking, confusion, trouble walking, loss of balance or coordination, dizziness, severe headache, change in vision  Uncontrolled and repetitive body movements, muscle stiffness or spasms, tremors or shaking, loss of balance or coordination, restlessness, shuffling walk, which may be signs of extrapyramidal symptoms (EPS)  Thoughts of suicide or self-harm, worsening mood, feelings of depression  Urges to engage in impulsive behaviors such as gambling, binge eating, sexual activity, or shopping in ways that are unusual for you  Side effects that usually do not require medical attention (report these to your care team if they continue or are bothersome):    Dry mouth  Constipation  Drowsiness  Weight gain  This list may not describe all possible side effects. Call your doctor for medical advice about side effects. You may report side effects to FDA at 8-295-FDA-9487.    Where should I keep my medication?  Keep out of the reach of children and pets.    Store at room temperature between 15 and 30 degrees C (59 and 86 degrees F). Throw away any unused medication after the expiration date.    NOTE: This sheet is a summary. It may not cover all possible information. If you have questions about this medicine, talk to your doctor, pharmacist, or health care provider.    Haloperidol Tablets  What is this medication?  HALOPERIDOL (ha sharona PER i dole) treats schizophrenia. It may also be used to manage the symptoms of Tourette disorder. It can also be used to treat severe behavior problems in children when other therapies have not worked. It works by balancing the levels of dopamine in your brain, a substance that helps regulate mood, behaviors, and thoughts. It belongs to a group of medications called antipsychotics. Antipsychotic medications can be used to treat several kinds of mental health conditions.    This medicine may be used for other purposes; ask your health care provider or pharmacist if you have questions.    COMMON BRAND NAME(S): Haldol    What should I tell my care team before I take this medication?  They need to know if you have any of these conditions:    Dementia  Diabetes  Difficulty swallowing  Have trouble controlling your muscles  Heart disease  History of irregular heartbeat  If you often drink alcohol  Liver disease  Low blood counts, like white blood cell, platelet, or red cell counts  Low levels of potassium or magnesium in the blood  Lung or breathing disease, like asthma  Parkinson's disease  Seizures  Thyroid disease  An unusual or allergic reaction to haloperidol, other medications, foods, dyes, or preservatives  Pregnant or trying to get pregnant  Breast-feeding  How should I use this medication?  Take this medication by mouth with a glass of water. Follow the directions on the prescription label. You can take this medication with or without food. Take your doses at regular intervals. Do not take your medication more often than directed. Do not suddenly stop taking this medication. You may need to gradually reduce the dose.    Talk to your care team about the use of this medication in children. Special care may be needed. While this medication may be prescribed for children for selected conditions, precautions do apply.    Overdosage: If you think you have taken too much of this medicine contact a poison control center or emergency room at once.    NOTE: This medicine is only for you. Do not share this medicine with others.    What if I miss a dose?  If you miss a dose, take it as soon as you can. If it is almost time for your next dose, take only that dose. Do not take double or extra doses.    What may interact with this medication?  Do not take this medication with any of the following:    Cisapride  Dronedarone  Metoclopramide  Pimozide  Thioridazine  This medication may also interact with the following:    Alcohol  Antihistamines for allergy, cough, and cold  Atropine  Certain medications for anxiety or sleep  Certain medications for bladder problems like oxybutynin, tolterodine  Certain medications for depression like amitriptyline, fluoxetine, sertraline  Certain medications for stomach problems like dicyclomine, hyoscyamine  Certain medications for travel sickness like scopolamine  Droperidol  Epinephrine  General anesthetics like halothane, isoflurane, methoxyflurane, propofol  Levodopa or other medications for Parkinson's disease  Lithium  Medications for blood pressure  Medications for seizures  Medications that relax muscles for surgery  Narcotic medications for pain  Other medications that prolong the QT interval (an abnormal heart rhythm)  Phenothiazines like chlorpromazine, prochlorperazine  Rifampin  Warfarin  This list may not describe all possible interactions. Give your health care provider a list of all the medicines, herbs, non-prescription drugs, or dietary supplements you use. Also tell them if you smoke, drink alcohol, or use illegal drugs. Some items may interact with your medicine.    What should I watch for while using this medication?  Visit your care team for regular checks on your progress. Tell your care team if symptoms do not start to get better or if they get worse. Do not stop taking except on your care team's advice.    You may get dizzy or drowsy or have blurred vision. Do not drive, use machinery, or do anything that needs mental alertness until you know how this medication affects you. Do not stand or sit up quickly, especially if you are an older patient. This reduces the risk of dizzy or fainting spells. Alcohol can increase dizziness and drowsiness. Avoid alcoholic drinks.    This medication may increase blood sugar. Ask your care team if changes in diet or medications are needed if you have diabetes.    Your mouth may get dry. Chewing sugarless gum or sucking hard candy, and drinking plenty of water may help. Contact your care team if the problem does not go away or is severe.    This medication can cause problems with controlling your body temperature. It can lower the response of your body to cold temperatures. If possible, stay indoors during cold weather. If you must go outdoors, wear warm clothes. It can also lower the response of your body to heat. Do not overheat. Do not over-exercise. Stay out of the sun when possible. If you must be in the sun, wear cool clothing. Drink plenty of water. If you have trouble controlling your body temperature, call your care team right away.    This medication can make you more sensitive to the sun. Keep out of the sun. If you cannot avoid being in the sun, wear protective clothing and use sunscreen. Do not use sun lamps or tanning beds/booths.    What side effects may I notice from receiving this medication?  Side effects that you should report to your care team as soon as possible:    Allergic reactions—skin rash, itching, hives, swelling of the face, lips, tongue, or throat  Heart rhythm changes—fast or irregular heartbeat, dizziness, feeling faint or lightheaded, chest pain, trouble breathing  High fever, stiff muscles, increased sweating, fast or irregular heartbeat, and confusion, which may be signs of neuroleptic malignant syndrome  High prolactin level—unexpected breast tissue growth, discharge from the nipple, change in sex drive or performance, irregular menstrual cycle  Infection—fever, chills, cough, or sore throat  Low blood pressure—dizziness, feeling faint or lightheaded, blurry vision  Seizures  Trouble passing urine  Uncontrolled and repetitive body movements, muscle stiffness or spasms, tremors or shaking, loss of balance or coordination, restlessness, shuffling walk, which may be signs of extrapyramidal symptoms (EPS)  Side effects that usually do not require medical attention (report to your care team if they continue or are bothersome):    Change in sex drive or performance  Constipation  Drowsiness  Dry mouth  Headache  Weight gain  This list may not describe all possible side effects. Call your doctor for medical advice about side effects. You may report side effects to FDA at 6-418-FDA-1460.    Where should I keep my medication?  Keep out of the reach of children.    Store at room temperature between 15 and 30 degrees C (59 and 86 degrees F). Protect from light. Keep container tightly closed. Throw away any unused medication after the expiration date.    NOTE: This sheet is a summary. It may not cover all possible information. If you have questions about this medicine, talk to your doctor, pharmacist, or health care provider.

## 2023-11-23 NOTE — ED ADULT TRIAGE NOTE - CHIEF COMPLAINT QUOTE
Pt. c/o episode of SOB and "chocking sensation" this AM. States she's had this in the past where her throat gets dry and feels this way. Now feels back to normal. Phx: autism, RA, schizo-affective d/o

## 2023-12-30 ENCOUNTER — EMERGENCY (EMERGENCY)
Facility: HOSPITAL | Age: 28
LOS: 1 days | Discharge: HOME | End: 2023-12-30
Attending: EMERGENCY MEDICINE
Payer: MEDICAID

## 2023-12-30 VITALS
SYSTOLIC BLOOD PRESSURE: 110 MMHG | HEART RATE: 90 BPM | WEIGHT: 210.1 LBS | OXYGEN SATURATION: 96 % | DIASTOLIC BLOOD PRESSURE: 77 MMHG | TEMPERATURE: 98 F | HEIGHT: 63 IN | RESPIRATION RATE: 18 BRPM

## 2023-12-30 VITALS
DIASTOLIC BLOOD PRESSURE: 64 MMHG | SYSTOLIC BLOOD PRESSURE: 112 MMHG | OXYGEN SATURATION: 99 % | RESPIRATION RATE: 17 BRPM | HEART RATE: 66 BPM | TEMPERATURE: 98 F

## 2023-12-30 LAB
ALBUMIN SERPL ELPH-MCNC: 4.3 G/DL — SIGNIFICANT CHANGE UP (ref 3.3–5)
ALBUMIN SERPL ELPH-MCNC: 4.3 G/DL — SIGNIFICANT CHANGE UP (ref 3.3–5)
ALP SERPL-CCNC: 72 U/L — SIGNIFICANT CHANGE UP (ref 40–120)
ALP SERPL-CCNC: 72 U/L — SIGNIFICANT CHANGE UP (ref 40–120)
ALT FLD-CCNC: 8 U/L — LOW (ref 10–45)
ALT FLD-CCNC: 8 U/L — LOW (ref 10–45)
ANION GAP SERPL CALC-SCNC: 14 MMOL/L — SIGNIFICANT CHANGE UP (ref 5–17)
ANION GAP SERPL CALC-SCNC: 14 MMOL/L — SIGNIFICANT CHANGE UP (ref 5–17)
APPEARANCE UR: ABNORMAL
APPEARANCE UR: ABNORMAL
AST SERPL-CCNC: 12 U/L — SIGNIFICANT CHANGE UP (ref 10–40)
AST SERPL-CCNC: 12 U/L — SIGNIFICANT CHANGE UP (ref 10–40)
BACTERIA # UR AUTO: ABNORMAL /HPF
BACTERIA # UR AUTO: ABNORMAL /HPF
BASOPHILS # BLD AUTO: 0.03 K/UL — SIGNIFICANT CHANGE UP (ref 0–0.2)
BASOPHILS # BLD AUTO: 0.03 K/UL — SIGNIFICANT CHANGE UP (ref 0–0.2)
BASOPHILS NFR BLD AUTO: 0.3 % — SIGNIFICANT CHANGE UP (ref 0–2)
BASOPHILS NFR BLD AUTO: 0.3 % — SIGNIFICANT CHANGE UP (ref 0–2)
BILIRUB SERPL-MCNC: 0.2 MG/DL — SIGNIFICANT CHANGE UP (ref 0.2–1.2)
BILIRUB SERPL-MCNC: 0.2 MG/DL — SIGNIFICANT CHANGE UP (ref 0.2–1.2)
BILIRUB UR-MCNC: NEGATIVE — SIGNIFICANT CHANGE UP
BILIRUB UR-MCNC: NEGATIVE — SIGNIFICANT CHANGE UP
BUN SERPL-MCNC: 6 MG/DL — LOW (ref 7–23)
BUN SERPL-MCNC: 6 MG/DL — LOW (ref 7–23)
CALCIUM SERPL-MCNC: 9.4 MG/DL — SIGNIFICANT CHANGE UP (ref 8.4–10.5)
CALCIUM SERPL-MCNC: 9.4 MG/DL — SIGNIFICANT CHANGE UP (ref 8.4–10.5)
CAST: 2 /LPF — SIGNIFICANT CHANGE UP (ref 0–4)
CAST: 2 /LPF — SIGNIFICANT CHANGE UP (ref 0–4)
CHLORIDE SERPL-SCNC: 105 MMOL/L — SIGNIFICANT CHANGE UP (ref 96–108)
CHLORIDE SERPL-SCNC: 105 MMOL/L — SIGNIFICANT CHANGE UP (ref 96–108)
CO2 SERPL-SCNC: 20 MMOL/L — LOW (ref 22–31)
CO2 SERPL-SCNC: 20 MMOL/L — LOW (ref 22–31)
COLOR SPEC: YELLOW — SIGNIFICANT CHANGE UP
COLOR SPEC: YELLOW — SIGNIFICANT CHANGE UP
CREAT SERPL-MCNC: 0.77 MG/DL — SIGNIFICANT CHANGE UP (ref 0.5–1.3)
CREAT SERPL-MCNC: 0.77 MG/DL — SIGNIFICANT CHANGE UP (ref 0.5–1.3)
DIFF PNL FLD: ABNORMAL
DIFF PNL FLD: ABNORMAL
EGFR: 106 ML/MIN/1.73M2 — SIGNIFICANT CHANGE UP
EGFR: 106 ML/MIN/1.73M2 — SIGNIFICANT CHANGE UP
EOSINOPHIL # BLD AUTO: 0 K/UL — SIGNIFICANT CHANGE UP (ref 0–0.5)
EOSINOPHIL # BLD AUTO: 0 K/UL — SIGNIFICANT CHANGE UP (ref 0–0.5)
EOSINOPHIL NFR BLD AUTO: 0 % — SIGNIFICANT CHANGE UP (ref 0–6)
EOSINOPHIL NFR BLD AUTO: 0 % — SIGNIFICANT CHANGE UP (ref 0–6)
GLUCOSE SERPL-MCNC: 118 MG/DL — HIGH (ref 70–99)
GLUCOSE SERPL-MCNC: 118 MG/DL — HIGH (ref 70–99)
GLUCOSE UR QL: NEGATIVE MG/DL — SIGNIFICANT CHANGE UP
GLUCOSE UR QL: NEGATIVE MG/DL — SIGNIFICANT CHANGE UP
HCG SERPL-ACNC: <2 MIU/ML — SIGNIFICANT CHANGE UP
HCG SERPL-ACNC: <2 MIU/ML — SIGNIFICANT CHANGE UP
HCT VFR BLD CALC: 36.4 % — SIGNIFICANT CHANGE UP (ref 34.5–45)
HCT VFR BLD CALC: 36.4 % — SIGNIFICANT CHANGE UP (ref 34.5–45)
HGB BLD-MCNC: 11.4 G/DL — LOW (ref 11.5–15.5)
HGB BLD-MCNC: 11.4 G/DL — LOW (ref 11.5–15.5)
IMM GRANULOCYTES NFR BLD AUTO: 0.4 % — SIGNIFICANT CHANGE UP (ref 0–0.9)
IMM GRANULOCYTES NFR BLD AUTO: 0.4 % — SIGNIFICANT CHANGE UP (ref 0–0.9)
KETONES UR-MCNC: 15 MG/DL
KETONES UR-MCNC: 15 MG/DL
LEUKOCYTE ESTERASE UR-ACNC: ABNORMAL
LEUKOCYTE ESTERASE UR-ACNC: ABNORMAL
LIDOCAIN IGE QN: 23 U/L — SIGNIFICANT CHANGE UP (ref 7–60)
LIDOCAIN IGE QN: 23 U/L — SIGNIFICANT CHANGE UP (ref 7–60)
LYMPHOCYTES # BLD AUTO: 0.97 K/UL — LOW (ref 1–3.3)
LYMPHOCYTES # BLD AUTO: 0.97 K/UL — LOW (ref 1–3.3)
LYMPHOCYTES # BLD AUTO: 10.7 % — LOW (ref 13–44)
LYMPHOCYTES # BLD AUTO: 10.7 % — LOW (ref 13–44)
MCHC RBC-ENTMCNC: 28.9 PG — SIGNIFICANT CHANGE UP (ref 27–34)
MCHC RBC-ENTMCNC: 28.9 PG — SIGNIFICANT CHANGE UP (ref 27–34)
MCHC RBC-ENTMCNC: 31.3 GM/DL — LOW (ref 32–36)
MCHC RBC-ENTMCNC: 31.3 GM/DL — LOW (ref 32–36)
MCV RBC AUTO: 92.2 FL — SIGNIFICANT CHANGE UP (ref 80–100)
MCV RBC AUTO: 92.2 FL — SIGNIFICANT CHANGE UP (ref 80–100)
MONOCYTES # BLD AUTO: 0.27 K/UL — SIGNIFICANT CHANGE UP (ref 0–0.9)
MONOCYTES # BLD AUTO: 0.27 K/UL — SIGNIFICANT CHANGE UP (ref 0–0.9)
MONOCYTES NFR BLD AUTO: 3 % — SIGNIFICANT CHANGE UP (ref 2–14)
MONOCYTES NFR BLD AUTO: 3 % — SIGNIFICANT CHANGE UP (ref 2–14)
NEUTROPHILS # BLD AUTO: 7.76 K/UL — HIGH (ref 1.8–7.4)
NEUTROPHILS # BLD AUTO: 7.76 K/UL — HIGH (ref 1.8–7.4)
NEUTROPHILS NFR BLD AUTO: 85.6 % — HIGH (ref 43–77)
NEUTROPHILS NFR BLD AUTO: 85.6 % — HIGH (ref 43–77)
NITRITE UR-MCNC: NEGATIVE — SIGNIFICANT CHANGE UP
NITRITE UR-MCNC: NEGATIVE — SIGNIFICANT CHANGE UP
NRBC # BLD: 0 /100 WBCS — SIGNIFICANT CHANGE UP (ref 0–0)
NRBC # BLD: 0 /100 WBCS — SIGNIFICANT CHANGE UP (ref 0–0)
PH UR: >=9 (ref 5–8)
PH UR: >=9 (ref 5–8)
PLATELET # BLD AUTO: 340 K/UL — SIGNIFICANT CHANGE UP (ref 150–400)
PLATELET # BLD AUTO: 340 K/UL — SIGNIFICANT CHANGE UP (ref 150–400)
POTASSIUM SERPL-MCNC: 4 MMOL/L — SIGNIFICANT CHANGE UP (ref 3.5–5.3)
POTASSIUM SERPL-MCNC: 4 MMOL/L — SIGNIFICANT CHANGE UP (ref 3.5–5.3)
POTASSIUM SERPL-SCNC: 4 MMOL/L — SIGNIFICANT CHANGE UP (ref 3.5–5.3)
POTASSIUM SERPL-SCNC: 4 MMOL/L — SIGNIFICANT CHANGE UP (ref 3.5–5.3)
PROT SERPL-MCNC: 8.1 G/DL — SIGNIFICANT CHANGE UP (ref 6–8.3)
PROT SERPL-MCNC: 8.1 G/DL — SIGNIFICANT CHANGE UP (ref 6–8.3)
PROT UR-MCNC: 30 MG/DL
PROT UR-MCNC: 30 MG/DL
RBC # BLD: 3.95 M/UL — SIGNIFICANT CHANGE UP (ref 3.8–5.2)
RBC # BLD: 3.95 M/UL — SIGNIFICANT CHANGE UP (ref 3.8–5.2)
RBC # FLD: 16.8 % — HIGH (ref 10.3–14.5)
RBC # FLD: 16.8 % — HIGH (ref 10.3–14.5)
RBC CASTS # UR COMP ASSIST: 165 /HPF — HIGH (ref 0–4)
RBC CASTS # UR COMP ASSIST: 165 /HPF — HIGH (ref 0–4)
REVIEW: SIGNIFICANT CHANGE UP
REVIEW: SIGNIFICANT CHANGE UP
SODIUM SERPL-SCNC: 139 MMOL/L — SIGNIFICANT CHANGE UP (ref 135–145)
SODIUM SERPL-SCNC: 139 MMOL/L — SIGNIFICANT CHANGE UP (ref 135–145)
SP GR SPEC: 1.03 — SIGNIFICANT CHANGE UP (ref 1–1.03)
SP GR SPEC: 1.03 — SIGNIFICANT CHANGE UP (ref 1–1.03)
SQUAMOUS # UR AUTO: 14 /HPF — HIGH (ref 0–5)
SQUAMOUS # UR AUTO: 14 /HPF — HIGH (ref 0–5)
UROBILINOGEN FLD QL: 1 MG/DL — SIGNIFICANT CHANGE UP (ref 0.2–1)
UROBILINOGEN FLD QL: 1 MG/DL — SIGNIFICANT CHANGE UP (ref 0.2–1)
WBC # BLD: 9.07 K/UL — SIGNIFICANT CHANGE UP (ref 3.8–10.5)
WBC # BLD: 9.07 K/UL — SIGNIFICANT CHANGE UP (ref 3.8–10.5)
WBC # FLD AUTO: 9.07 K/UL — SIGNIFICANT CHANGE UP (ref 3.8–10.5)
WBC # FLD AUTO: 9.07 K/UL — SIGNIFICANT CHANGE UP (ref 3.8–10.5)
WBC UR QL: 14 /HPF — HIGH (ref 0–5)
WBC UR QL: 14 /HPF — HIGH (ref 0–5)

## 2023-12-30 PROCEDURE — 83690 ASSAY OF LIPASE: CPT

## 2023-12-30 PROCEDURE — 99285 EMERGENCY DEPT VISIT HI MDM: CPT | Mod: 25

## 2023-12-30 PROCEDURE — 81001 URINALYSIS AUTO W/SCOPE: CPT

## 2023-12-30 PROCEDURE — 74177 CT ABD & PELVIS W/CONTRAST: CPT | Mod: MA

## 2023-12-30 PROCEDURE — 80053 COMPREHEN METABOLIC PANEL: CPT

## 2023-12-30 PROCEDURE — 99284 EMERGENCY DEPT VISIT MOD MDM: CPT | Mod: 25

## 2023-12-30 PROCEDURE — 85025 COMPLETE CBC W/AUTO DIFF WBC: CPT

## 2023-12-30 PROCEDURE — 84702 CHORIONIC GONADOTROPIN TEST: CPT

## 2023-12-30 PROCEDURE — 87086 URINE CULTURE/COLONY COUNT: CPT

## 2023-12-30 PROCEDURE — 74177 CT ABD & PELVIS W/CONTRAST: CPT | Mod: 26,MA

## 2023-12-30 PROCEDURE — 96374 THER/PROPH/DIAG INJ IV PUSH: CPT | Mod: XU

## 2023-12-30 RX ORDER — CEFTRIAXONE 500 MG/1
1000 INJECTION, POWDER, FOR SOLUTION INTRAMUSCULAR; INTRAVENOUS ONCE
Refills: 0 | Status: COMPLETED | OUTPATIENT
Start: 2023-12-30 | End: 2023-12-30

## 2023-12-30 RX ORDER — SODIUM CHLORIDE 9 MG/ML
1000 INJECTION INTRAMUSCULAR; INTRAVENOUS; SUBCUTANEOUS ONCE
Refills: 0 | Status: COMPLETED | OUTPATIENT
Start: 2023-12-30 | End: 2023-12-30

## 2023-12-30 RX ORDER — CEFUROXIME AXETIL 250 MG
1 TABLET ORAL
Qty: 14 | Refills: 0
Start: 2023-12-30 | End: 2024-01-05

## 2023-12-30 RX ADMIN — SODIUM CHLORIDE 1000 MILLILITER(S): 9 INJECTION INTRAMUSCULAR; INTRAVENOUS; SUBCUTANEOUS at 04:23

## 2023-12-30 RX ADMIN — CEFTRIAXONE 100 MILLIGRAM(S): 500 INJECTION, POWDER, FOR SOLUTION INTRAMUSCULAR; INTRAVENOUS at 06:41

## 2023-12-30 NOTE — ED ADULT NURSE NOTE - NSFALLRISKASMTTYPE_ED_ALL_ED
Spoke with pt and gave all results and instructions in [facility] below and he verbalized understanding and will call back for an appt. Initial (On Arrival)

## 2023-12-30 NOTE — ED ADULT NURSE NOTE - NSFALLUNIVINTERV_ED_ALL_ED
Bed/Stretcher in lowest position, wheels locked, appropriate side rails in place/Call bell, personal items and telephone in reach/Instruct patient to call for assistance before getting out of bed/chair/stretcher/Non-slip footwear applied when patient is off stretcher/[place] to call system/Physically safe environment - no spills, clutter or unnecessary equipment/Purposeful proactive rounding/Room/bathroom lighting operational, light cord in reach Bed/Stretcher in lowest position, wheels locked, appropriate side rails in place/Call bell, personal items and telephone in reach/Instruct patient to call for assistance before getting out of bed/chair/stretcher/Non-slip footwear applied when patient is off stretcher/[place] to call system/Physically safe environment - no spills, clutter or unnecessary equipment/Purposeful proactive rounding/Room/bathroom lighting operational, light cord in reach

## 2023-12-30 NOTE — ED ADULT NURSE NOTE - NS ED NURSE DC INFO COMPLEXITY
----- Message from April Stewart sent at 8/17/2023 10:14 AM CDT -----  Regarding: med refill  .Type:  RX Refill Request    Who Called:  patient  Refill or New Rx: refill  RX Name and Strength: promethazine-dextromethorphan (PROMETHAZINE-DM) 6.25-15 mg/5 mL Syrp  How is the patient currently taking it? (ex. 1XDay):  Is this a 30 day or 90 day RX:  Preferred Pharmacy with phone number: [place] [phone] [address]   Local or Mail Order: local  Ordering Provider: Jan  Would the patient rather a call back or a response via [facility]?  Call back  Best Call Back Number: [phone]  Additional Information: patient is requesting a refill.    .Type:  RX Refill Request    Who Called:  patient  Refill or New Rx: refill  RX Name and Strength: HYDROcodone-acetaminophen (NORCO) 7.5-325 mg per tablet  How is the patient currently taking it? (ex. 1XDay):  Is this a 30 day or 90 day RX: 90  [facility] [place] [id number] [address]   Local or Mail Order: local  Ordering Provider: Jan  Would the patient rather a call back or a response via [facility]?  Call back  Best Call Back Number: [phone]  Additional Information: patient is requesting a refill.        
Simple: Patient demonstrates quick and easy understanding

## 2023-12-30 NOTE — ED PROVIDER NOTE - CLINICAL SUMMARY MEDICAL DECISION MAKING FREE TEXT BOX
Patient is a 28 year-old-female with history of autism and L kidney stone brought in by mother for concerns of abdominal pain and nausea/vomiting. Exam unremarkable. DDx includes kidney stone vs appy vs colitis. Workup includes labs, UA/UC and CTAP. Patient is a 28 year-old-female with history of autism and L kidney stone brought in by mother for concerns of abdominal pain and nausea/vomiting. Exam unremarkable. Well appearing, no active vomiting in ED, poorly localized R sided abd TTP, neg murphys, neg mcburneys, no grr. No CVAT, no skin rashes. DDx includes kidney stone vs appy vs colitis vs UTI/pyelo. Workup includes labs, UA/UC and CTAP. Dispo pending results of labs and imaging.

## 2023-12-30 NOTE — ED PROVIDER NOTE - CARE PLAN
1 Principal Discharge DX:	Acute UTI  Secondary Diagnosis:	Pain, abdominal  Secondary Diagnosis:	Nausea & vomiting

## 2023-12-30 NOTE — ED PROVIDER NOTE - OBJECTIVE STATEMENT
Patient is a 28 year-old-female with history of autism and L kidney stone brought in by mother for concerns of abdominal pain and nausea/vomiting. Reports that she started c/o pain in the RLQ quadrant around 6pm with associated NBNB vomiting. Intermittent pain. Denies fever at home, chills, chest pain, shortness of breath. Currently on her period.

## 2023-12-30 NOTE — ED ADULT TRIAGE NOTE - INTERNATIONAL TRAVEL
Prediabetes: Care Instructions Your Care Instructions Prediabetes is a warning sign that you are at risk for getting type 2 diabetes. It means that your blood sugar is higher than it should be. The food you eat turns into sugar, which your body uses for energy. Normally, an organ called the pancreas makes insulin, which allows the sugar in your blood to get into your body's cells. But when your body can't use insulin the right way, the sugar doesn't move into cells. It stays in your blood instead. This is called insulin resistance. The buildup of sugar in the blood causes prediabetes. The good news is that lifestyle changes may help you get your blood sugar back to normal and help you avoid or delay diabetes. Follow-up care is a key part of your treatment and safety. Be sure to make and go to all appointments, and call your doctor if you are having problems. It's also a good idea to know your test results and keep a list of the medicines you take. How can you care for yourself at home? · Watch your weight. A healthy weight helps your body use insulin properly. · Limit the amount of calories, sweets, and unhealthy fat you eat. Ask your doctor if you should see a dietitian. A registered dietitian can help you create meal plans that fit your lifestyle. · Get at least 30 minutes of exercise on most days of the week. Exercise helps control your blood sugar. It also helps you maintain a healthy weight. Walking is a good choice. You also may want to do other activities, such as running, swimming, cycling, or playing tennis or team sports. · Do not smoke. Smoking can make prediabetes worse. If you need help quitting, talk to your doctor about stop-smoking programs and medicines. These can increase your chances of quitting for good. · If your doctor prescribed medicines, take them exactly as prescribed. Call your doctor if you think you are having a problem with your medicine. You will get more details on the specific medicines your doctor prescribes. When should you call for help? Watch closely for changes in your health, and be sure to contact your doctor if: 
  · You have any symptoms of diabetes. These may include: 
? Being thirsty more often. ? Urinating more. ? Being hungrier. ? Losing weight. ? Being very tired. ? Having blurry vision.  
  · You have a wound that will not heal.  
  · You have an infection that will not go away.  
  · You have problems with your blood pressure.  
  · You want more information about diabetes and how you can keep from getting it. Where can you learn more? Go to http://cleo-erma.info/. Enter I222 in the search box to learn more about \"Prediabetes: Care Instructions. \" Current as of: July 25, 2018 Content Version: 11.9 © 5215-9607 99tests. Care instructions adapted under license by [employer] (which disclaims liability or warranty for this information). If you have questions about a medical condition or this instruction, always ask your healthcare professional. [address] any warranty or liability for your use of this information. Learning About Diabetes Food Guidelines Your Care Instructions Meal planning is important to manage diabetes. It helps keep your blood sugar at a target level (which you set with your doctor). You don't have to eat special foods. You can eat what your family eats, including sweets once in a while. But you do have to pay attention to how often you eat and how much you eat of certain foods. You may want to work with a dietitian or a certified diabetes educator (CDE) to help you plan meals and snacks. A dietitian or CDE can also help you lose weight if that is one of your goals. What should you know about eating carbs?  
Managing the amount of carbohydrate (carbs) you eat is an important part of healthy meals when you have diabetes. Carbohydrate is found in many foods. · Learn which foods have carbs. And learn the amounts of carbs in different foods. ? Bread, cereal, pasta, and rice have about 15 grams of carbs in a serving. A serving is 1 slice of bread (1 ounce), ½ cup of cooked cereal, or 1/3 cup of cooked pasta or rice. ? Fruits have 15 grams of carbs in a serving. A serving is 1 small fresh fruit, such as an apple or orange; ½ of a banana; ½ cup of cooked or canned fruit; ½ cup of fruit juice; 1 cup of melon or raspberries; or 2 tablespoons of dried fruit. ? Milk and no-sugar-added yogurt have 15 grams of carbs in a serving. A serving is 1 cup of milk or 2/3 cup of no-sugar-added yogurt. ? Starchy vegetables have 15 grams of carbs in a serving. A serving is ½ cup of mashed potatoes or sweet potato; 1 cup winter squash; ½ of a small baked potato; ½ cup of cooked beans; or ½ cup cooked corn or green peas. · Learn how much carbs to eat each day and at each meal. A dietitian or CDE can teach you how to keep track of the amount of carbs you eat. This is called carbohydrate counting. · If you are not sure how to count carbohydrate grams, use the Plate Method to plan meals. It is a good, quick way to make sure that you have a balanced meal. It also helps you spread carbs throughout the day. ? Divide your plate by types of foods. Put non-starchy vegetables on half the plate, meat or other protein food on one-quarter of the plate, and a grain or starchy vegetable in the final quarter of the plate. To this you can add a small piece of fruit and 1 cup of milk or yogurt, depending on how many carbs you are supposed to eat at a meal. 
· Try to eat about the same amount of carbs at each meal. Do not \"save up\" your daily allowance of carbs to eat at one meal. 
· Proteins have very little or no carbs per serving.  Examples of proteins are beef, chicken, turkey, fish, eggs, tofu, cheese, cottage cheese, and peanut butter. A serving size of meat is 3 ounces, which is about the size of a deck of cards. Examples of meat substitute serving sizes (equal to 1 ounce of meat) are 1/4 cup of cottage cheese, 1 egg, 1 tablespoon of peanut butter, and ½ cup of tofu. How can you eat out and still eat healthy? · Learn to estimate the serving sizes of foods that have carbohydrate. If you measure food at home, it will be easier to estimate the amount in a serving of restaurant food. · If the meal you order has too much carbohydrate (such as potatoes, corn, or baked beans), ask to have a low-carbohydrate food instead. Ask for a salad or green vegetables. · If you use insulin, check your blood sugar before and after eating out to help you plan how much to eat in the future. · If you eat more carbohydrate at a meal than you had planned, take a walk or do other exercise. This will help lower your blood sugar. What else should you know? · Limit saturated fat, such as the fat from meat and dairy products. This is a healthy choice because people who have diabetes are at higher risk of heart disease. So choose lean cuts of meat and nonfat or low-fat dairy products. Use olive or canola oil instead of butter or shortening when cooking. · Don't skip meals. Your blood sugar may drop too low if you skip meals and take insulin or certain medicines for diabetes. · Check with your doctor before you drink alcohol. Alcohol can cause your blood sugar to drop too low. Alcohol can also cause a bad reaction if you take certain diabetes medicines. Follow-up care is a key part of your treatment and safety. Be sure to make and go to all appointments, and call your doctor if you are having problems. It's also a good idea to know your test results and keep a list of the medicines you take. Where can you learn more? Go to http://cleo-erma.info/.  
Enter U618 in the search box to learn more about \"Learning About Diabetes Food Guidelines. \" Current as of: July 25, 2018 Content Version: 11.9 © 0353-6529 [employer]. Care instructions adapted under license by [employer] (which disclaims liability or warranty for this information). If you have questions about a medical condition or this instruction, always ask your healthcare professional. [place] [address] any warranty or liability for your use of this information. No

## 2023-12-30 NOTE — ED PROVIDER NOTE - PROGRESS NOTE DETAILS
Ryan PGY3  CT did not show acute pathologies. UA concerning for UTI, will tx with ceftriaxone. DC with abx. Return precautions reviewed with mother at bedside and recommended PMD luis keyes. Davis PGY3  CT did not show acute pathologies. Despite pt being at tail end of menses, UA concerning for UTI, w elevated pH and many bacteria, will tx with ceftriaxone. DC with abx. Return precautions reviewed with mother at bedside and recommended PMD follow up.

## 2023-12-30 NOTE — ED PROVIDER NOTE - NSFOLLOWUPINSTRUCTIONS_ED_ALL_ED_FT
You were seen in the emergency department for abdominal pain and nausea/vomiting and found to have urinary tract infection.   You can find the results of all the tests in this discharge packet.   Please follow up with your primary care doctor within 48 hours for continuation of care.     Return to the emergency department if you experience any new/concerning/worsening symptoms such as but not limited to: fever (>100.3F), intractable nausea, vomiting, chest pain, shortness of breath, worsening abdominal pain.     You are prescribed:  1) Cefuroxime: take 500mg every 12 hours for 7 days

## 2023-12-30 NOTE — ED PROVIDER NOTE - PATIENT PORTAL LINK FT
You can access the FollowMyHealth Patient Portal offered by [facility] by registering at the following website: [email or web address]. By joining [employer]’s FollowMyHealth portal, you will also be able to view your health information using other applications (apps) compatible with our system. You can access the FollowMyHealth Patient Portal offered by [facility] by registering at the following website: [email or web address]. By joining [employer]’s FollowMyHealth portal, you will also be able to view your health information using other applications (apps) compatible with our system.

## 2023-12-30 NOTE — ED ADULT NURSE NOTE - OBJECTIVE STATEMENT
28y F PMH autism bibEMS from home c/o n/v. Mother at bedside reports pt has episodes of vomiting this evening a/w RLQ pain. Mother reports pt asked to come to the hospital, which is why the mother was concerned. Pt reports feeling better now s/p vomiting. Mother reports pt has hx of gallstones, denies surgical intervention. No PSH. Last BM 6pm. LMP 12/16. Denies fevers, chills, diarrhea, hematemesis, cp, sob, cough, urinary symptoms. Comfort and safety maintained.

## 2023-12-30 NOTE — ED PROVIDER NOTE - PHYSICAL EXAMINATION
Vitals: I have reviewed the patients vital signs  General: Well dressed, well appearing, no acute distress  HEENT: Atraumatic, normocephalic, airway patent  Eyes: EOMI, tracking appropriately  Neck: no tracheal deviation, no JVD  Chest/Lungs: no trauma, symmetric chest rise, speaking in complete sentences, no WOB  Heart: skin and extremities well perfused, regular rate and rhythm  Abdomen: soft, nontender and nondistended, no CVA tenderness   Neuro: A+Ox3, ambulating without difficulty, CN grossly intact  MSK: strength at baseline in all extremities, no muscle wasting or atrophy  Skin: no cyanosis, no jaundice, no new emergent lesions

## 2023-12-30 NOTE — ED ADULT NURSE NOTE - HIV OFFER
PROCEDURE INFORMATION: 

Exam: MR Head Without and With Contrast; Internal Auditory Canals 

Exam date and time: 9/1/2021 5:20 PM 

Age: 35 years old 

Clinical indication: Other: Hearing loss 



TECHNIQUE: 

Imaging protocol: MR of the head without and with intravenous contrast. Exam 

focused on the internal auditory canals. 

Contrast material: PROHANCE; Contrast volume: 17 ml; Contrast route: 

INTRAVENOUS (IV);  



COMPARISON: 

No relevant prior studies available. 



FINDINGS:



Midline structures and cerebellar tonsillar position are normal. 

Ventricles and basilar cisterns are normal in size. 

No intracranial mass, acute hemorrhage or midline shift. 

No abnormal focal extra-axial fluid. 



No abnormal parenchymal or meningeal contrast enhancement. 

FLAIR and T2 sequences show no abnormal signal in white matter. 



IAC's are normal in appearance without mass or abnormal enhancement. 

No effacement of cerebellar pontine angle CSF signal. 



Optic chiasm and pituitary infundibulum appear normal. 



Mastoid air cells are appropriately aerated. 

Maxillary sinus retention cyst or polyp on the right, and mild frontal mucosal 

thickening. 

Globes and orbits are unremarkable. 



Vascular flow voids are maintained and the vertebral basilar and carotid 

systems and in the major dural venous sinuses. 



IMPRESSION:



Unremarkable pre and post gadolinium enhanced MRI of the brain and IACs.  



Electronically signed by: Kurt Miller On 09/01/2021  18:11:20 PM Previously Declined (within the last year)

## 2024-01-01 LAB
CULTURE RESULTS: SIGNIFICANT CHANGE UP
CULTURE RESULTS: SIGNIFICANT CHANGE UP
SPECIMEN SOURCE: SIGNIFICANT CHANGE UP
SPECIMEN SOURCE: SIGNIFICANT CHANGE UP

## 2024-01-02 ENCOUNTER — EMERGENCY (EMERGENCY)
Facility: HOSPITAL | Age: 29
LOS: 1 days | Discharge: HOME | End: 2024-01-02
Admitting: STUDENT IN AN ORGANIZED HEALTH CARE EDUCATION/TRAINING PROGRAM
Payer: MEDICAID

## 2024-01-02 VITALS
TEMPERATURE: 98 F | OXYGEN SATURATION: 100 % | SYSTOLIC BLOOD PRESSURE: 108 MMHG | HEIGHT: 63 IN | DIASTOLIC BLOOD PRESSURE: 74 MMHG | RESPIRATION RATE: 18 BRPM | HEART RATE: 92 BPM

## 2024-01-02 PROCEDURE — 99284 EMERGENCY DEPT VISIT MOD MDM: CPT | Mod: 25

## 2024-01-02 NOTE — ED ADULT TRIAGE NOTE - CHIEF COMPLAINT QUOTE
Pt arrives with mother c/o epigastric pain since 12/30. Denies n/v/d, fever, chills. Hx Autism, Kidney stones.

## 2024-01-03 VITALS
SYSTOLIC BLOOD PRESSURE: 107 MMHG | HEART RATE: 84 BPM | DIASTOLIC BLOOD PRESSURE: 73 MMHG | RESPIRATION RATE: 16 BRPM | OXYGEN SATURATION: 100 % | TEMPERATURE: 98 F

## 2024-01-03 LAB
ALBUMIN SERPL ELPH-MCNC: 4 G/DL — SIGNIFICANT CHANGE UP (ref 3.3–5)
ALBUMIN SERPL ELPH-MCNC: 4 G/DL — SIGNIFICANT CHANGE UP (ref 3.3–5)
ALP SERPL-CCNC: 77 U/L — SIGNIFICANT CHANGE UP (ref 40–120)
ALP SERPL-CCNC: 77 U/L — SIGNIFICANT CHANGE UP (ref 40–120)
ALT FLD-CCNC: 14 U/L — SIGNIFICANT CHANGE UP (ref 4–33)
ALT FLD-CCNC: 14 U/L — SIGNIFICANT CHANGE UP (ref 4–33)
ANION GAP SERPL CALC-SCNC: 14 MMOL/L — SIGNIFICANT CHANGE UP (ref 7–14)
ANION GAP SERPL CALC-SCNC: 14 MMOL/L — SIGNIFICANT CHANGE UP (ref 7–14)
APPEARANCE UR: ABNORMAL
APPEARANCE UR: ABNORMAL
AST SERPL-CCNC: 33 U/L — HIGH (ref 4–32)
AST SERPL-CCNC: 33 U/L — HIGH (ref 4–32)
BACTERIA # UR AUTO: ABNORMAL /HPF
BACTERIA # UR AUTO: ABNORMAL /HPF
BILIRUB SERPL-MCNC: 0.3 MG/DL — SIGNIFICANT CHANGE UP (ref 0.2–1.2)
BILIRUB SERPL-MCNC: 0.3 MG/DL — SIGNIFICANT CHANGE UP (ref 0.2–1.2)
BILIRUB UR-MCNC: NEGATIVE — SIGNIFICANT CHANGE UP
BILIRUB UR-MCNC: NEGATIVE — SIGNIFICANT CHANGE UP
BUN SERPL-MCNC: 7 MG/DL — SIGNIFICANT CHANGE UP (ref 7–23)
BUN SERPL-MCNC: 7 MG/DL — SIGNIFICANT CHANGE UP (ref 7–23)
CALCIUM SERPL-MCNC: 8.9 MG/DL — SIGNIFICANT CHANGE UP (ref 8.4–10.5)
CALCIUM SERPL-MCNC: 8.9 MG/DL — SIGNIFICANT CHANGE UP (ref 8.4–10.5)
CAST: 0 /LPF — SIGNIFICANT CHANGE UP (ref 0–4)
CAST: 0 /LPF — SIGNIFICANT CHANGE UP (ref 0–4)
CHLORIDE SERPL-SCNC: 105 MMOL/L — SIGNIFICANT CHANGE UP (ref 98–107)
CHLORIDE SERPL-SCNC: 105 MMOL/L — SIGNIFICANT CHANGE UP (ref 98–107)
CO2 SERPL-SCNC: 19 MMOL/L — LOW (ref 22–31)
CO2 SERPL-SCNC: 19 MMOL/L — LOW (ref 22–31)
COLOR SPEC: YELLOW — SIGNIFICANT CHANGE UP
COLOR SPEC: YELLOW — SIGNIFICANT CHANGE UP
CREAT SERPL-MCNC: 0.61 MG/DL — SIGNIFICANT CHANGE UP (ref 0.5–1.3)
CREAT SERPL-MCNC: 0.61 MG/DL — SIGNIFICANT CHANGE UP (ref 0.5–1.3)
DIFF PNL FLD: NEGATIVE — SIGNIFICANT CHANGE UP
DIFF PNL FLD: NEGATIVE — SIGNIFICANT CHANGE UP
EGFR: 123 ML/MIN/1.73M2 — SIGNIFICANT CHANGE UP
EGFR: 123 ML/MIN/1.73M2 — SIGNIFICANT CHANGE UP
GLUCOSE SERPL-MCNC: 97 MG/DL — SIGNIFICANT CHANGE UP (ref 70–99)
GLUCOSE SERPL-MCNC: 97 MG/DL — SIGNIFICANT CHANGE UP (ref 70–99)
GLUCOSE UR QL: NEGATIVE MG/DL — SIGNIFICANT CHANGE UP
GLUCOSE UR QL: NEGATIVE MG/DL — SIGNIFICANT CHANGE UP
HCG SERPL-ACNC: <1 MIU/ML — SIGNIFICANT CHANGE UP
HCG SERPL-ACNC: <1 MIU/ML — SIGNIFICANT CHANGE UP
KETONES UR-MCNC: ABNORMAL MG/DL
KETONES UR-MCNC: ABNORMAL MG/DL
LEUKOCYTE ESTERASE UR-ACNC: NEGATIVE — SIGNIFICANT CHANGE UP
LEUKOCYTE ESTERASE UR-ACNC: NEGATIVE — SIGNIFICANT CHANGE UP
LIDOCAIN IGE QN: 25 U/L — SIGNIFICANT CHANGE UP (ref 7–60)
LIDOCAIN IGE QN: 25 U/L — SIGNIFICANT CHANGE UP (ref 7–60)
NITRITE UR-MCNC: NEGATIVE — SIGNIFICANT CHANGE UP
NITRITE UR-MCNC: NEGATIVE — SIGNIFICANT CHANGE UP
PH UR: 6.5 — SIGNIFICANT CHANGE UP (ref 5–8)
PH UR: 6.5 — SIGNIFICANT CHANGE UP (ref 5–8)
POTASSIUM SERPL-MCNC: 4.7 MMOL/L — SIGNIFICANT CHANGE UP (ref 3.5–5.3)
POTASSIUM SERPL-MCNC: 4.7 MMOL/L — SIGNIFICANT CHANGE UP (ref 3.5–5.3)
POTASSIUM SERPL-SCNC: 4.7 MMOL/L — SIGNIFICANT CHANGE UP (ref 3.5–5.3)
POTASSIUM SERPL-SCNC: 4.7 MMOL/L — SIGNIFICANT CHANGE UP (ref 3.5–5.3)
PROT SERPL-MCNC: 7.9 G/DL — SIGNIFICANT CHANGE UP (ref 6–8.3)
PROT SERPL-MCNC: 7.9 G/DL — SIGNIFICANT CHANGE UP (ref 6–8.3)
PROT UR-MCNC: 30 MG/DL
PROT UR-MCNC: 30 MG/DL
RBC CASTS # UR COMP ASSIST: 2 /HPF — SIGNIFICANT CHANGE UP (ref 0–4)
RBC CASTS # UR COMP ASSIST: 2 /HPF — SIGNIFICANT CHANGE UP (ref 0–4)
SODIUM SERPL-SCNC: 138 MMOL/L — SIGNIFICANT CHANGE UP (ref 135–145)
SODIUM SERPL-SCNC: 138 MMOL/L — SIGNIFICANT CHANGE UP (ref 135–145)
SP GR SPEC: 1.03 — SIGNIFICANT CHANGE UP (ref 1–1.03)
SP GR SPEC: 1.03 — SIGNIFICANT CHANGE UP (ref 1–1.03)
SQUAMOUS # UR AUTO: 29 /HPF — HIGH (ref 0–5)
SQUAMOUS # UR AUTO: 29 /HPF — HIGH (ref 0–5)
UROBILINOGEN FLD QL: 1 MG/DL — SIGNIFICANT CHANGE UP (ref 0.2–1)
UROBILINOGEN FLD QL: 1 MG/DL — SIGNIFICANT CHANGE UP (ref 0.2–1)
WBC UR QL: 4 /HPF — SIGNIFICANT CHANGE UP (ref 0–5)
WBC UR QL: 4 /HPF — SIGNIFICANT CHANGE UP (ref 0–5)

## 2024-01-03 RX ORDER — SUCRALFATE 1 G
1 TABLET ORAL
Qty: 40 | Refills: 0
Start: 2024-01-03 | End: 2024-01-12

## 2024-01-03 RX ORDER — SODIUM CHLORIDE 9 MG/ML
1000 INJECTION, SOLUTION INTRAVENOUS ONCE
Refills: 0 | Status: COMPLETED | OUTPATIENT
Start: 2024-01-03 | End: 2024-01-03

## 2024-01-03 RX ORDER — FAMOTIDINE 10 MG/ML
1 INJECTION INTRAVENOUS
Qty: 60 | Refills: 0
Start: 2024-01-03 | End: 2024-02-01

## 2024-01-03 RX ORDER — ONDANSETRON 8 MG/1
4 TABLET, FILM COATED ORAL ONCE
Refills: 0 | Status: COMPLETED | OUTPATIENT
Start: 2024-01-03 | End: 2024-01-03

## 2024-01-03 RX ORDER — FAMOTIDINE 10 MG/ML
20 INJECTION INTRAVENOUS ONCE
Refills: 0 | Status: COMPLETED | OUTPATIENT
Start: 2024-01-03 | End: 2024-01-03

## 2024-01-03 RX ADMIN — SODIUM CHLORIDE 1000 MILLILITER(S): 9 INJECTION, SOLUTION INTRAVENOUS at 04:05

## 2024-01-03 RX ADMIN — Medication 30 MILLILITER(S): at 04:05

## 2024-01-03 RX ADMIN — ONDANSETRON 4 MILLIGRAM(S): 8 TABLET, FILM COATED ORAL at 04:05

## 2024-01-03 RX ADMIN — FAMOTIDINE 20 MILLIGRAM(S): 10 INJECTION INTRAVENOUS at 04:05

## 2024-01-03 NOTE — ED PROVIDER NOTE - NSFOLLOWUPINSTRUCTIONS_ED_ALL_ED_FT
Follow up with your primary doctor and see a Gastroenterologist. Take Pepcid and Carafate as prescribed. Advance activity as tolerated.  Continue all previously prescribed medications as directed.  Follow up with your primary care physician in 48-72 hours- bring copies of your results.  Return to the ER for worsening or persistent symptoms, and/or ANY NEW OR CONCERNING SYMPTOMS. THIS INCLUDES BUT IS NOT LIMITED TO FEVER, CHILLS, NIGHTSWEATS, INCREASING OR PERSISTENT PAIN OR FOR ANY OTHER SYMPTOMS THAT CONCERN YOU. If you have issues obtaining follow up, please call: [phone] to obtain a doctor or specialist who takes your insurance in your area.  You may call [phone] to make an appointment with the internal medicine clinic. Follow up with your primary doctor and see a Gastroenterologist. Take Pepcid and Carafate as prescribed. Advance activity as tolerated.  Continue all previously prescribed medications as directed.  Follow up with your primary care physician in 48-72 hours- bring copies of your results.  Return to the ER for worsening or persistent symptoms, and/or ANY NEW OR CONCERNING SYMPTOMS. THIS INCLUDES BUT IS NOT LIMITED TO FEVER, CHILLS, NIGHTSWEATS, INCREASING OR PERSISTENT PAIN OR FOR ANY OTHER SYMPTOMS THAT CONCERN YOU. If you have issues obtaining follow up, please call: [phone] to obtain a doctor or specialist who takes your insurance in your area.  You may call [phone] to make an appointment with the internal medicine clinic.

## 2024-01-03 NOTE — ED PROVIDER NOTE - NSPTACCESSSVCSAPPTDETAILS_ED_ALL_ED_FT
Gastroenterology follow up for the next available appointment for abdominal pain. The patient's mother is the best resource.

## 2024-01-03 NOTE — ED ADULT NURSE NOTE - NSFALLUNIVINTERV_ED_ALL_ED
Bed/Stretcher in lowest position, wheels locked, appropriate side rails in place/Call bell, personal items and telephone in reach/Instruct patient to call for assistance before getting out of bed/chair/stretcher/Non-slip footwear applied when patient is off stretcher/[place] to call system/Physically safe environment - no spills, clutter or unnecessary equipment/Purposeful proactive rounding/Room/bathroom lighting operational, light cord in reach Bed/Stretcher in lowest position, wheels locked, appropriate side rails in place/Call bell, personal items and telephone in reach/Instruct patient to call for assistance before getting out of bed/chair/stretcher/Non-slip footwear applied when patient is off stretcher/[place] to call system/Physically safe environment - no spills, clutter or unnecessary equipment/Purposeful proactive rounding/Room/bathroom lighting operational, light cord in reach

## 2024-01-03 NOTE — ED PROVIDER NOTE - PATIENT PORTAL LINK FT
You can access the FollowMyHealth Patient Portal offered by [facility] by registering at the following website: [email or web address]. By joining [employer]’s FollowMyHealth portal, you will also be able to view your health information using other applications (apps) compatible with our system. You can access the FollowMyHealth Patient Portal offered by [facility] by registering at the following website: [email or web address]. By joining [employer]’s FollowMyHealth portal, you will also be able to view your health information using other applications (apps) compatible with our system.

## 2024-01-03 NOTE — ED PROVIDER NOTE - CLINICAL SUMMARY MEDICAL DECISION MAKING FREE TEXT BOX
27 Y/o F PMH L reji verduzco, Autism presents with epigastric abdominal pain that began this evening and has resolved at the time of interview. Pt states she felt nauseous but did not vomit and denies fever/chills/nightsweats or urinary sx. Pt is well appearing, afebrile and no acute distress. Pt has no abdominal tenderness on exam and reports that her sx have resolved. Engaged in a shared decision making discussion with the pt and mother regarding performing CT imaging again, will defer the study. Will obtain labs to eval for anemia, electrolyte disturbance, pancreatitis or transamanitis. WIll hydrate with IV fluid and provide Maalox and Pepcid for her since resolved epigastric pain. 27 Y/o F PMH L kidney stone, Autism presents with epigastric abdominal pain that began this evening and has resolved at the time of interview. Pt states she felt nauseous but did not vomit and denies fever/chills/nightsweats or urinary sx. Pt is well appearing, afebrile and no acute distress. Pt has no abdominal tenderness on exam and reports that her sx have resolved. Engaged in a shared decision making discussion with the pt and mother regarding performing CT imaging again, will defer the study. Will obtain labs to eval for anemia, electrolyte disturbance, pancreatitis or transamanitis. WIll hydrate with IV fluid and provide Maalox and Pepcid for her since resolved epigastric pain.

## 2024-01-03 NOTE — ED ADULT NURSE NOTE - OBJECTIVE STATEMENT
Patient received in intake. A&O4. Ambulatory. Past medical history of autism. Came into ED with complaints of epigastric pain since 12/30. States feeling nauseas and a burning sensation in stomach. Mom at bedside states patient has had prior episodes of vomiting a few weeks ago with abdominal pain. Patient presents well, no signs of acute distress noted. Respirations even and unlabored. Denies SOB, chest pain. Comfort and safety maintained. Call bell within reach. labs drawn and sent. Medicated per MD orders.

## 2024-01-03 NOTE — ED PROVIDER NOTE - MDM ORDERS SUBMITTED SELECTION
Post-Anesthesia Evaluation and Assessment    Patient: Shavonne Cruz MRN: [id number]  SSN: [id number]    YOB: 1994  Age: 20 y.o. Sex: female       Cardiovascular Function/Vital Signs  Visit Vitals    BP 99/60    Pulse 95    Temp 36.7 °C (98.1 °F)    Resp 16    SpO2 99%    Breastfeeding Yes       Patient is status post epidural anesthesia for Procedure(s):   SECTION. Nausea/Vomiting: None    Postoperative hydration reviewed and adequate. Pain:  Pain Scale 1: Numeric (0 - 10) (18)  Pain Intensity 1: 5 (18)   Managed    Neurological Status:   Neuro (WDL): Exceptions to WDL (18)   Block resolving    Mental Status and Level of Consciousness: Alert and oriented     Pulmonary Status:   O2 Device: Room air (18)   Adequate oxygenation and airway patent    Complications related to anesthesia: itching    Post-anesthesia assessment completed.  No concerns    Signed By: Dianne Johnson MD     May 8, 2018 Medications

## 2024-01-12 ENCOUNTER — APPOINTMENT (OUTPATIENT)
Dept: GASTROENTEROLOGY | Facility: CLINIC | Age: 29
End: 2024-01-12

## 2024-04-23 ENCOUNTER — EMERGENCY (EMERGENCY)
Facility: HOSPITAL | Age: 29
LOS: 1 days | Discharge: HOME | End: 2024-04-23
Attending: EMERGENCY MEDICINE | Admitting: EMERGENCY MEDICINE
Payer: MEDICAID

## 2024-04-23 VITALS
DIASTOLIC BLOOD PRESSURE: 70 MMHG | SYSTOLIC BLOOD PRESSURE: 112 MMHG | HEART RATE: 76 BPM | OXYGEN SATURATION: 100 % | TEMPERATURE: 98 F | RESPIRATION RATE: 18 BRPM

## 2024-04-23 VITALS
RESPIRATION RATE: 17 BRPM | SYSTOLIC BLOOD PRESSURE: 105 MMHG | TEMPERATURE: 98 F | DIASTOLIC BLOOD PRESSURE: 72 MMHG | HEART RATE: 76 BPM | OXYGEN SATURATION: 100 %

## 2024-04-23 LAB
ALBUMIN SERPL ELPH-MCNC: 4.1 G/DL — SIGNIFICANT CHANGE UP (ref 3.3–5)
ALP SERPL-CCNC: 65 U/L — SIGNIFICANT CHANGE UP (ref 40–120)
ALT FLD-CCNC: 17 U/L — SIGNIFICANT CHANGE UP (ref 4–33)
ANION GAP SERPL CALC-SCNC: 11 MMOL/L — SIGNIFICANT CHANGE UP (ref 7–14)
AST SERPL-CCNC: 44 U/L — HIGH (ref 4–32)
BASOPHILS # BLD AUTO: 0.03 K/UL — SIGNIFICANT CHANGE UP (ref 0–0.2)
BASOPHILS NFR BLD AUTO: 0.4 % — SIGNIFICANT CHANGE UP (ref 0–2)
BILIRUB SERPL-MCNC: 0.3 MG/DL — SIGNIFICANT CHANGE UP (ref 0.2–1.2)
BUN SERPL-MCNC: 7 MG/DL — SIGNIFICANT CHANGE UP (ref 7–23)
CALCIUM SERPL-MCNC: 9 MG/DL — SIGNIFICANT CHANGE UP (ref 8.4–10.5)
CHLORIDE SERPL-SCNC: 100 MMOL/L — SIGNIFICANT CHANGE UP (ref 98–107)
CO2 SERPL-SCNC: 21 MMOL/L — LOW (ref 22–31)
CREAT SERPL-MCNC: 0.66 MG/DL — SIGNIFICANT CHANGE UP (ref 0.5–1.3)
EGFR: 120 ML/MIN/1.73M2 — SIGNIFICANT CHANGE UP
EOSINOPHIL # BLD AUTO: 0.15 K/UL — SIGNIFICANT CHANGE UP (ref 0–0.5)
EOSINOPHIL NFR BLD AUTO: 2.2 % — SIGNIFICANT CHANGE UP (ref 0–6)
GLUCOSE SERPL-MCNC: 85 MG/DL — SIGNIFICANT CHANGE UP (ref 70–99)
HCT VFR BLD CALC: 38.1 % — SIGNIFICANT CHANGE UP (ref 34.5–45)
HGB BLD-MCNC: 12.6 G/DL — SIGNIFICANT CHANGE UP (ref 11.5–15.5)
IANC: 2.95 K/UL — SIGNIFICANT CHANGE UP (ref 1.8–7.4)
IMM GRANULOCYTES NFR BLD AUTO: 0.3 % — SIGNIFICANT CHANGE UP (ref 0–0.9)
LIDOCAIN IGE QN: 42 U/L — SIGNIFICANT CHANGE UP (ref 7–60)
LYMPHOCYTES # BLD AUTO: 3.11 K/UL — SIGNIFICANT CHANGE UP (ref 1–3.3)
LYMPHOCYTES # BLD AUTO: 45.5 % — HIGH (ref 13–44)
MCHC RBC-ENTMCNC: 28.8 PG — SIGNIFICANT CHANGE UP (ref 27–34)
MCHC RBC-ENTMCNC: 33.1 GM/DL — SIGNIFICANT CHANGE UP (ref 32–36)
MCV RBC AUTO: 87.2 FL — SIGNIFICANT CHANGE UP (ref 80–100)
MONOCYTES # BLD AUTO: 0.58 K/UL — SIGNIFICANT CHANGE UP (ref 0–0.9)
MONOCYTES NFR BLD AUTO: 8.5 % — SIGNIFICANT CHANGE UP (ref 2–14)
NEUTROPHILS # BLD AUTO: 2.95 K/UL — SIGNIFICANT CHANGE UP (ref 1.8–7.4)
NEUTROPHILS NFR BLD AUTO: 43.1 % — SIGNIFICANT CHANGE UP (ref 43–77)
NRBC # BLD: 0 /100 WBCS — SIGNIFICANT CHANGE UP (ref 0–0)
NRBC # FLD: 0 K/UL — SIGNIFICANT CHANGE UP (ref 0–0)
PLATELET # BLD AUTO: 385 K/UL — SIGNIFICANT CHANGE UP (ref 150–400)
POTASSIUM SERPL-MCNC: SIGNIFICANT CHANGE UP MMOL/L (ref 3.5–5.3)
POTASSIUM SERPL-SCNC: SIGNIFICANT CHANGE UP MMOL/L (ref 3.5–5.3)
PROT SERPL-MCNC: 8.3 G/DL — SIGNIFICANT CHANGE UP (ref 6–8.3)
RBC # BLD: 4.37 M/UL — SIGNIFICANT CHANGE UP (ref 3.8–5.2)
RBC # FLD: 16.3 % — HIGH (ref 10.3–14.5)
SODIUM SERPL-SCNC: 132 MMOL/L — LOW (ref 135–145)
WBC # BLD: 6.84 K/UL — SIGNIFICANT CHANGE UP (ref 3.8–10.5)
WBC # FLD AUTO: 6.84 K/UL — SIGNIFICANT CHANGE UP (ref 3.8–10.5)

## 2024-04-23 PROCEDURE — 99284 EMERGENCY DEPT VISIT MOD MDM: CPT | Mod: 25

## 2024-04-23 PROCEDURE — 76705 ECHO EXAM OF ABDOMEN: CPT | Mod: 26

## 2024-04-23 NOTE — ED PROVIDER NOTE - OBJECTIVE STATEMENT
29-year-old female with history of schizoaffective disorder, autism presenting with 1 day of right-sided abdominal pain.  Patient reports pain beginning in the morning denies associated fever, chills, nausea, vomiting, diarrhea, constipation, back pain, urinary symptoms.  Patient took sulcrafate and Pepcid at home this morning with relief.  No pain at present.

## 2024-04-23 NOTE — ED ADULT NURSE REASSESSMENT NOTE - NS ED NURSE REASSESS COMMENT FT1
Pt passed po challenge, denies abdominal pain, nausea, vomiting, respirations even unlabored, mom at bedside, pt awaiting dc papers

## 2024-04-23 NOTE — ED PROVIDER NOTE - PROGRESS NOTE DETAILS
Charlie CONDE: Pt reassessed and feels better. She is tolerating PO and has no recurrence of abd pain here in ED.  I independently reviewed the labs and/or imaging results, and there are no emergent findings.

## 2024-04-23 NOTE — ED ADULT TRIAGE NOTE - CHIEF COMPLAINT QUOTE
Patient c/o right-sided abdominal pain x 1 day. Denies N/V, fevers/chills and urinary symptoms. Hx. autism and schizoaffective.

## 2024-04-23 NOTE — ED PROVIDER NOTE - CLINICAL SUMMARY MEDICAL DECISION MAKING FREE TEXT BOX
28 y/o F with h/o autism, schizoaffective disorder p/w 1 day R sided abd pain.  Pt with mild RUQ tenderness on exam but no murphys sign and abd otherwise soft and benign.  Poss gastritis/gerd now resolved.  No peritoneal signs and does not warrant cross-sectional imaging at this time.  Will obtain screening labs, r/o pregnancy, eval for biliary pathology, pt declining pain meds now, will reassess.

## 2024-04-23 NOTE — ED PROVIDER NOTE - PATIENT PORTAL LINK FT
You can access the FollowMyHealth Patient Portal offered by [facility] by registering at the following website: [email or web address]. By joining [employer]’s FollowMyHealth portal, you will also be able to view your health information using other applications (apps) compatible with our system.

## 2024-04-23 NOTE — ED PROVIDER NOTE - NSFOLLOWUPINSTRUCTIONS_ED_ALL_ED_FT
You were seen in the emergency department for abdominal pain.  You had blood work and an ultrasound, which did not reveal any concerning findings.    Drink plenty of fluids.  You can take Tylenol 650mg every 4 hours as needed for pain or fever.  You can also take over-the-counter stomach medications, such as PEPCID or MAALOX as needed for stomach pains.  Follow-up with your PMD in 1-2 weeks as needed.  Return to the emergency department for any new or worsening symptoms.

## 2024-04-23 NOTE — ED ADULT NURSE NOTE - NSFALLUNIVINTERV_ED_ALL_ED
Bed/Stretcher in lowest position, wheels locked, appropriate side rails in place/Call bell, personal items and telephone in reach/Instruct patient to call for assistance before getting out of bed/chair/stretcher/Non-slip footwear applied when patient is off stretcher/[place] to call system/Physically safe environment - no spills, clutter or unnecessary equipment/Purposeful proactive rounding/Room/bathroom lighting operational, light cord in reach

## 2024-05-09 ENCOUNTER — INPATIENT (INPATIENT)
Facility: HOSPITAL | Age: 29
LOS: 12 days | Discharge: HOME | End: 2024-05-22
Attending: STUDENT IN AN ORGANIZED HEALTH CARE EDUCATION/TRAINING PROGRAM | Admitting: STUDENT IN AN ORGANIZED HEALTH CARE EDUCATION/TRAINING PROGRAM
Payer: MEDICAID

## 2024-05-09 VITALS
HEART RATE: 84 BPM | RESPIRATION RATE: 16 BRPM | DIASTOLIC BLOOD PRESSURE: 82 MMHG | OXYGEN SATURATION: 98 % | SYSTOLIC BLOOD PRESSURE: 120 MMHG | TEMPERATURE: 98 F

## 2024-05-09 DIAGNOSIS — F20.3 UNDIFFERENTIATED SCHIZOPHRENIA: ICD-10-CM

## 2024-05-09 LAB
AMPHET UR-MCNC: NEGATIVE — SIGNIFICANT CHANGE UP
APAP SERPL-MCNC: <10 UG/ML — LOW (ref 15–25)
APPEARANCE UR: ABNORMAL
BACTERIA # UR AUTO: ABNORMAL /HPF
BARBITURATES UR SCN-MCNC: NEGATIVE — SIGNIFICANT CHANGE UP
BASOPHILS # BLD AUTO: 0.06 K/UL — SIGNIFICANT CHANGE UP (ref 0–0.2)
BASOPHILS NFR BLD AUTO: 1 % — SIGNIFICANT CHANGE UP (ref 0–2)
BENZODIAZ UR-MCNC: NEGATIVE — SIGNIFICANT CHANGE UP
BILIRUB UR-MCNC: NEGATIVE — SIGNIFICANT CHANGE UP
CAST: 0 /LPF — SIGNIFICANT CHANGE UP (ref 0–4)
COCAINE METAB.OTHER UR-MCNC: NEGATIVE — SIGNIFICANT CHANGE UP
COLOR SPEC: YELLOW — SIGNIFICANT CHANGE UP
CREATININE URINE RESULT, DAU: 212 MG/DL — SIGNIFICANT CHANGE UP
DIFF PNL FLD: NEGATIVE — SIGNIFICANT CHANGE UP
EOSINOPHIL # BLD AUTO: 0.1 K/UL — SIGNIFICANT CHANGE UP (ref 0–0.5)
EOSINOPHIL NFR BLD AUTO: 1.6 % — SIGNIFICANT CHANGE UP (ref 0–6)
ETHANOL SERPL-MCNC: <10 MG/DL — SIGNIFICANT CHANGE UP
GLUCOSE UR QL: NEGATIVE MG/DL — SIGNIFICANT CHANGE UP
HCG SERPL-ACNC: <1 MIU/ML — SIGNIFICANT CHANGE UP
HCT VFR BLD CALC: 37.3 % — SIGNIFICANT CHANGE UP (ref 34.5–45)
HGB BLD-MCNC: 11.6 G/DL — SIGNIFICANT CHANGE UP (ref 11.5–15.5)
IANC: 2.93 K/UL — SIGNIFICANT CHANGE UP (ref 1.8–7.4)
IMM GRANULOCYTES NFR BLD AUTO: 0.2 % — SIGNIFICANT CHANGE UP (ref 0–0.9)
KETONES UR-MCNC: ABNORMAL MG/DL
LEUKOCYTE ESTERASE UR-ACNC: NEGATIVE — SIGNIFICANT CHANGE UP
LYMPHOCYTES # BLD AUTO: 2.73 K/UL — SIGNIFICANT CHANGE UP (ref 1–3.3)
LYMPHOCYTES # BLD AUTO: 44.2 % — HIGH (ref 13–44)
MCHC RBC-ENTMCNC: 28 PG — SIGNIFICANT CHANGE UP (ref 27–34)
MCHC RBC-ENTMCNC: 31.1 GM/DL — LOW (ref 32–36)
MCV RBC AUTO: 89.9 FL — SIGNIFICANT CHANGE UP (ref 80–100)
METHADONE UR-MCNC: NEGATIVE — SIGNIFICANT CHANGE UP
MONOCYTES # BLD AUTO: 0.35 K/UL — SIGNIFICANT CHANGE UP (ref 0–0.9)
MONOCYTES NFR BLD AUTO: 5.7 % — SIGNIFICANT CHANGE UP (ref 2–14)
NEUTROPHILS # BLD AUTO: 2.93 K/UL — SIGNIFICANT CHANGE UP (ref 1.8–7.4)
NEUTROPHILS NFR BLD AUTO: 47.3 % — SIGNIFICANT CHANGE UP (ref 43–77)
NITRITE UR-MCNC: NEGATIVE — SIGNIFICANT CHANGE UP
NRBC # BLD: 0 /100 WBCS — SIGNIFICANT CHANGE UP (ref 0–0)
NRBC # FLD: 0 K/UL — SIGNIFICANT CHANGE UP (ref 0–0)
OPIATES UR-MCNC: NEGATIVE — SIGNIFICANT CHANGE UP
OXYCODONE UR-MCNC: NEGATIVE — SIGNIFICANT CHANGE UP
PCP SPEC-MCNC: SIGNIFICANT CHANGE UP
PCP UR-MCNC: NEGATIVE — SIGNIFICANT CHANGE UP
PH UR: 6.5 — SIGNIFICANT CHANGE UP (ref 5–8)
PLATELET # BLD AUTO: 365 K/UL — SIGNIFICANT CHANGE UP (ref 150–400)
PROT UR-MCNC: SIGNIFICANT CHANGE UP MG/DL
RBC # BLD: 4.15 M/UL — SIGNIFICANT CHANGE UP (ref 3.8–5.2)
RBC # FLD: 15.9 % — HIGH (ref 10.3–14.5)
RBC CASTS # UR COMP ASSIST: 2 /HPF — SIGNIFICANT CHANGE UP (ref 0–4)
REVIEW: SIGNIFICANT CHANGE UP
SALICYLATES SERPL-MCNC: <0.3 MG/DL — LOW (ref 15–30)
SARS-COV-2 RNA SPEC QL NAA+PROBE: SIGNIFICANT CHANGE UP
SP GR SPEC: 1.02 — SIGNIFICANT CHANGE UP (ref 1–1.03)
SQUAMOUS # UR AUTO: 24 /HPF — HIGH (ref 0–5)
THC UR QL: NEGATIVE — SIGNIFICANT CHANGE UP
TSH SERPL-MCNC: 1.97 UIU/ML — SIGNIFICANT CHANGE UP (ref 0.27–4.2)
UROBILINOGEN FLD QL: 0.2 MG/DL — SIGNIFICANT CHANGE UP (ref 0.2–1)
WBC # BLD: 6.18 K/UL — SIGNIFICANT CHANGE UP (ref 3.8–10.5)
WBC # FLD AUTO: 6.18 K/UL — SIGNIFICANT CHANGE UP (ref 3.8–10.5)
WBC UR QL: 4 /HPF — SIGNIFICANT CHANGE UP (ref 0–5)

## 2024-05-09 PROCEDURE — 99285 EMERGENCY DEPT VISIT HI MDM: CPT | Mod: 25

## 2024-05-09 PROCEDURE — 99285 EMERGENCY DEPT VISIT HI MDM: CPT

## 2024-05-09 RX ORDER — HALOPERIDOL DECANOATE 100 MG/ML
2 INJECTION INTRAMUSCULAR ONCE
Refills: 0 | Status: DISCONTINUED | OUTPATIENT
Start: 2024-05-09 | End: 2024-05-22

## 2024-05-09 RX ORDER — HALOPERIDOL DECANOATE 100 MG/ML
20 INJECTION INTRAMUSCULAR AT BEDTIME
Refills: 0 | Status: DISCONTINUED | OUTPATIENT
Start: 2024-05-09 | End: 2024-05-16

## 2024-05-09 RX ORDER — ARIPIPRAZOLE 15 MG/1
5 TABLET ORAL DAILY
Refills: 0 | Status: DISCONTINUED | OUTPATIENT
Start: 2024-05-09 | End: 2024-05-09

## 2024-05-09 RX ORDER — ARIPIPRAZOLE 15 MG/1
5 TABLET ORAL DAILY
Refills: 0 | Status: DISCONTINUED | OUTPATIENT
Start: 2024-05-09 | End: 2024-05-15

## 2024-05-09 RX ORDER — HALOPERIDOL DECANOATE 100 MG/ML
2 INJECTION INTRAMUSCULAR EVERY 6 HOURS
Refills: 0 | Status: DISCONTINUED | OUTPATIENT
Start: 2024-05-09 | End: 2024-05-22

## 2024-05-09 RX ADMIN — ARIPIPRAZOLE 5 MILLIGRAM(S): 15 TABLET ORAL at 06:17

## 2024-05-09 RX ADMIN — HALOPERIDOL DECANOATE 20 MILLIGRAM(S): 100 INJECTION INTRAMUSCULAR at 20:38

## 2024-05-09 NOTE — ED ADULT NURSE NOTE - OBJECTIVE STATEMENT
break coverage RN- presents calm and cooperative. C/O auditory hallucinations. " people teasing me" denies SI/HI VH denies drugs or alcohol. No complaints of chest pain, headache, nausea, dizziness, vomiting  SOB, fever, chills urinary symptoms verbalized. Phx schizoaffective and autism. will continue to monitor.

## 2024-05-09 NOTE — ED PROVIDER NOTE - NSFOLLOWUPINSTRUCTIONS_ED_ALL_ED_FT
Follow up with your PMD within 48-72 hrs. Show copies of your reports given to you.   Worsening, continued or new concerning symptoms return to the emergency department.    You have been given information necessary to follow up with the  [facility] ([facility]) Crisis center & other outpatient  psychiatric clinics within your community    • [facility] walk in Crisis centre  [address] [place] [address]  [phone] [email or web address]  Hours of operation:  Day	                                        Hours  Sunday                                  Closed  Monday                                9am - 3pm  Tuesday                                9am - 3pm  Wednesday                          9am - 3pm  Thursday                               9am - 3pm  Friday                                    9am - 3pm  Saturday                                Closed

## 2024-05-09 NOTE — ED PROVIDER NOTE - PROGRESS NOTE DETAILS
JENNIFER Reynolds: Patient currently in NAD. Given food and water for hydration. Pending transfer to [facility].

## 2024-05-09 NOTE — BH INPATIENT PSYCHIATRY ASSESSMENT NOTE - HPI (INCLUDE ILLNESS QUALITY, SEVERITY, DURATION, TIMING, CONTEXT, MODIFYING FACTORS, ASSOCIATED SIGNS AND SYMPTOMS)
28 yo woman, on SSD, PPHx ASD w/ schizophrenia, 8 previous admissions , multiple ED presentations, no hx of suicide attempts, history of aggression in the past (hitting), PMH of obesity, pre-diabetes who in treatment at [facility], last seen on 12/26/23, bib today by her mom for worsening of +ah .     As per psych ED:   as per the last em note on 12/26/23: "Pt's psychotic symptoms are at baseline. Encouraged pt to continue to engage in [facility] program. Continue abilify 5 mg daily in addition to haldol 20 mg QHS. Pt is doing better psychiatrically and is more engaged in her PROS program. RTC 4-6 weeks- pt prefers zoom"    On today's assessment pt , reports she is hearing more voices and the voices are louder than usual, "telling to hurt others, anyone". She is also reporting the  voices are negative and laugh at her "they tell me to be like them". Pt denies she wants to act on the voices , denies any si/hi . She states she has ah and vh at baseline but finds that she is seeing more people with pale skin and black clothing and red eyes " they are weighing down on me, making my knees and my stomach hurt" , with somatic preoccupations . Patient feels paranoid that people want to hurt her. Patient states she finds herself feeling depressed because of it asking to be in the hospital , does not think her medications are working as before. She is endorsing poor appetite, low energy level, amotivation and not sleeping well. No manic sx. Patient reports taking medications     As per the collateral, mom reports  pt was complaining of stomach pain today , but also feels it is tied up to the mental health issues , was   reporting hearing more voices and mom notices that pt having conversation with herself more than usual. Patient has ah and vh at baseline . Patient is not eating well , today she tried to have her eat at least one meal. Patient is not sleeping at baseline . No verbalization of si/hi.   She is in  [facility] program attends 2x/week  , pt currently to Haldol 20mg qhs , Abilify 5mg qdaily . Pt was previously on the ZHAO Haldol and was doing well , currently not on it and mom feels she would benefit. No medical issues. No allergies. Pt has paternal aun : undiagnosed psychiatric hx.    Upon evaluation on ML4, pt is calm, cooperative , in good behavioral control, eating lunch in no acute distress. Pt reports similar narrative as in the psych ED. Endorses worsening CAH to hurt others and are hypercritical and negativistic. Pt reports that they have been worsening at home without a clear trigger. Endorses that voices have already been improving since being on the unit, stating that when she is distracted and talking to others the voices become more attenuated. Pt endorses chronic VH at baseline of pale skinned people wearing black clothings with red eyes, denies current VH on the unit. Pt denies acute depressed mood, feeling brighter since time in ED. Pt denies SI/HI, future oriented, motivated to optimize psychiatric tx, reports doing best when she was on Haldol ZHAO and is very amenable to be restarted on this during hospital stay. Reports sleep has been erratic at home with good appetite. denies recent substances.

## 2024-05-09 NOTE — BH INPATIENT PSYCHIATRY ASSESSMENT NOTE - NSBHASSESSSUMMFT_PSY_ALL_CORE
28 yo woman, on SSD, PPHx ASD w/ schizophrenia, 8 previous admissions , multiple ED presentations, no hx of suicide attempts, history of aggression in the past (hitting), PMH of obesity, pre-diabetes who in treatment at [facility], last seen on 12/26/23, bib today by her mom for worsening of +ah . Endorses CAH at baseline, however reports worsening CAH to hurt others lately, denies intent, help seeking and future oriented for treatment, with plans to transition to Haldol ZHAO.     Plan:  -c/w home Haldol 20 mg qhs - plans to transition to ZHAO   -c/w abilify 5 mg qd

## 2024-05-09 NOTE — BH INPATIENT PSYCHIATRY ASSESSMENT NOTE - NSBHMETABOLIC_PSY_ALL_CORE_FT
BMI: BMI (kg/m2): 36.7 (05-09-24 @ 13:35)  HbA1c:   Glucose: POCT Blood Glucose.: 98 mg/dL (05-09-24 @ 02:12)    BP: 114/80 (05-09-24 @ 12:29) (114/80 - 127/79)Vital Signs Last 24 Hrs  T(C): 36.6 (05-09-24 @ 13:35), Max: 36.9 (05-09-24 @ 12:29)  T(F): 97.9 (05-09-24 @ 13:35), Max: 98.4 (05-09-24 @ 12:29)  HR: 80 (05-09-24 @ 12:29) (80 - 92)  BP: 114/80 (05-09-24 @ 12:29) (114/80 - 127/79)  BP(mean): 79 (05-09-24 @ 06:35) (79 - 79)  RR: 16 (05-09-24 @ 13:35) (16 - 17)  SpO2: 100% (05-09-24 @ 13:35) (98% - 100%)    Orthostatic VS  05-09-24 @ 13:35  Lying BP: --/-- HR: --  Sitting BP: 116/69 HR: 79  Standing BP: 103/74 HR: 94  Site: --  Mode: --    Lipid Panel:

## 2024-05-09 NOTE — BH PATIENT PROFILE - FUNCTIONAL ASSESSMENT - DAILY ACTIVITY SCORE.
Plan  1.  DEXA ordered    2.  Will treat left otitis media with amoxicillin twice daily for 10 days.  It can sometimes take up to 2 weeks for all the fluid to drain out.  If no improvement contact us.    3.  Follow up to be determined.  If no other follow-up needed could do Medicare wellness visit only sometime before the end of the year.     24

## 2024-05-09 NOTE — ED BEHAVIORAL HEALTH ASSESSMENT NOTE - DESCRIPTION
prediabetes calm, cooperative, no prns required  Vital Signs Last 24 Hrs  T(C): 36.4 (09 May 2024 01:58), Max: 36.4 (09 May 2024 01:58)  T(F): 97.6 (09 May 2024 01:58), Max: 97.6 (09 May 2024 01:58)  HR: 84 (09 May 2024 01:58) (84 - 84)  BP: 120/82 (09 May 2024 01:58) (120/82 - 120/82)  BP(mean): --  RR: 16 (09 May 2024 01:58) (16 - 16)  SpO2: 98% (09 May 2024 01:58) (98% - 98%)    Parameters below as of 09 May 2024 01:58  Patient On (Oxygen Delivery Method): room air domicile with her family, attends [facility] program

## 2024-05-09 NOTE — ED BEHAVIORAL HEALTH ASSESSMENT NOTE - SUMMARY
28 yo woman, on SSD, PPHx ASD w/ schizophrenia, 8 previous admissions , multiple ED presentations, no hx of suicide attempts, history of aggression in the past (hitting), PMH of obesity, pre-diabetes who in treatment at [facility], last seen on 12/26/23, bib today by her mom for worsening of +ah .   Patient presents with worsening of psychotic sx in spite of compliance with medications. Due to the worsening of psychosis pt has difficulty with sleep and appetite and is not functioning at her baseline  . Pt is requesting voluntary admission for stabilization and medications changes.  No beds are available at this time , pt and mom are aware.

## 2024-05-09 NOTE — ED BEHAVIORAL HEALTH ASSESSMENT NOTE - DETAILS
none knonw teeling her to hurt others, denies any specific person , denies having hi/i/p boarding mom informed paternal aunt : undiagnosed psychiatric disorder left VM for unit attending left VM for mom

## 2024-05-09 NOTE — ED BEHAVIORAL HEALTH ASSESSMENT NOTE - NSBHMSEGAIT_PSY_A_CORE
Diabetic Eye Exam Form    Date Requested: 19  Patient: Jenifer Back  Patient : 1947   Referring Provider: Blanche Estrada MD   [address] [place] [address]  [facility] [phone] Fax [phone]    Dilated Retinal Exam        Yes         No     Date of Diabetic Eye Exam ______________________________  Left Eye      Exam did show retinopathy    Exam did not show retinopathy         Mild       Moderate       None       Proliferative       Severe     Right Eye     Exam did show retinopathy    Exam did not show retinopathy         Mild       Moderate       None       Proliferative       Severe     Comments __________________________________________________________    Practice Providing Exam ______________________________________________    Exam Performed By (print name) _______________________________________      Provider Signature ___________________________________________________      These reports are needed for  compliance    Please fax this completed form and a copy of the Diabetic Eye Exam report to our office as soon as possible to [phone] radhika Swan: Phone [phone]    We thank you for your assistance in treating our mutual patient Unable to assess

## 2024-05-09 NOTE — ED ADULT TRIAGE NOTE - CHIEF COMPLAINT QUOTE
pt c/o "pressure" on body from stress and A/H "teasing me & calling me names" and V/H of people. hx schizoaffective disorder, autism. reports compliant with meds denies S/I, H/I, medical complaints. calm and cooperative in triage.

## 2024-05-09 NOTE — ED BEHAVIORAL HEALTH ASSESSMENT NOTE - RISK ASSESSMENT
low acute risk for self harm to harm to others, denies si/hi/i/p, has insight , compliant with medications, has supportive family, in treatment.  risk factors: chronic mental illness, prior hospitalizations, current psychotic sx worsened with cah.  immediate risk will be mitigated by inpatient hospitalization.

## 2024-05-09 NOTE — BH INPATIENT PSYCHIATRY ASSESSMENT NOTE - NSCOMMENTSUICRISKFACT_PSY_ALL_CORE
pt is chronically elevated risk given CAH to hurt others, hx of psychosis, depression, and hx impulsivity

## 2024-05-09 NOTE — ED PROVIDER NOTE - PHYSICAL EXAMINATION
General: Well appearing, well nourished, in no distress  Head: Normocephalic, atraumatic  Eyes: Conjunctiva clear, sclera non-icteric  Mouth: Mucous membranes moist  Neck: Supple  Heart: Regular rate and rhythm, no murmur or gallop  Lungs: Clear to auscultation  Abdomen: soft, no tenderness, non distended  Extremities: No amputations or deformities, cyanosis, edema  Musculoskeletal: EARL pulses intact  Neurologic: No gross deficits   Psychiatric: Oriented X3, normal mood and affect  Skin: Warm,dry.

## 2024-05-09 NOTE — BH PATIENT PROFILE - FALL HARM RISK - UNIVERSAL INTERVENTIONS
Bed in lowest position, wheels locked, appropriate side rails in place/Call bell, personal items and telephone in reach/Instruct patient to call for assistance before getting out of bed or chair/Non-slip footwear when patient is out of bed/Pease to call system/Physically safe environment - no spills, clutter or unnecessary equipment/Purposeful Proactive Rounding/Room/bathroom lighting operational, light cord in reach

## 2024-05-09 NOTE — ED BEHAVIORAL HEALTH ASSESSMENT NOTE - HPI (INCLUDE ILLNESS QUALITY, SEVERITY, DURATION, TIMING, CONTEXT, MODIFYING FACTORS, ASSOCIATED SIGNS AND SYMPTOMS)
28 yo woman, on SSD, PPHx ASD w/ schizophrenia, 8 previous admissions , multiple ED presentations, no hx of suicide attempts, history of aggression in the past (hitting), PMH of obesity, pre-diabetes who in treatment at [facility], last seen on 12/26/23, bib today by her mom for worsening of +ah .     as per the last em note on 12/26/23: "Pt's psychotic symptoms are at baseline. Encouraged pt to continue to engage in PROS program. Continue abilify 5 mg daily in addition to haldol 20 mg QHS. Pt is doing better psychiatrically and is more engaged in her PROS program. RTC 4-6 weeks- pt prefers zoom"    On today's assessment pt , reports she is hearing more voices and the voices are louder than usual, "telling to hurt others, anyone". She is also reporting the  voices are negative and laugh at her "they tell me to be like them". Pt denies she wants to act on the voices , denies any si/hi . She states she has ah and vh at baseline but finds that she is seeing more people with pale skin and black clothing and red eyes " they are weighing down on me, making my knees and my stomach hurt" , with somatic preoccupations . Patient feels paranoid that people want to hurt her. Patient states she finds herself feeling depressed because of it asking to be in the hospital , does not think her medications are working as before. She is endorsing poor appetite, low energy level, amotivation and not sleeping well. No manic sx. Patient reports taking medications     As per the collateral, mom reports  pt was complaining of stomach pain today , but also feels it is tied up to the mental health issues , was   reporting hearing more voices and mom notices that pt having conversation with herself more than usual. Patient has ah and vh at baseline . Patient is not eating well , today she tried to have her eat at least one meal. Patient is not sleeping at baseline . No verbalization of si/hi.   She is in  [facility] program attends 2x/week  , pt currently to Haldol 20mg qhs , Abilify 5mg qdaily . Pt was previously on the ZHAO Haldol and was doing well , currently not on it and mom feels she would benefit. No medical issues. No allergies. Pt has paternal aun : undiagnosed psychiatric hx.

## 2024-05-09 NOTE — BH INPATIENT PSYCHIATRY ASSESSMENT NOTE - CURRENT MEDICATION
MEDICATIONS  (STANDING):  ARIPiprazole 5 milliGRAM(s) Oral daily  haloperidol     Tablet 20 milliGRAM(s) Oral at bedtime    MEDICATIONS  (PRN):  haloperidol     Tablet 2 milliGRAM(s) Oral every 6 hours PRN agitation  haloperidol    Injectable 2 milliGRAM(s) IntraMuscular once PRN agitation  LORazepam     Tablet 2 milliGRAM(s) Oral every 6 hours PRN agitation  LORazepam   Injectable 2 milliGRAM(s) IntraMuscular once PRN Agitation

## 2024-05-09 NOTE — ED BEHAVIORAL HEALTH NOTE - BEHAVIORAL HEALTH NOTE
CALI informed by team patient cleared for dc to [facility].  CALI contacted central intake [phone] to obtain bed on low4 [id number]. CALI faxed legals to central [facility]. Nurse arranging for transport.  CALI contacted patients mother, Michelle ([phone]), regarding patient's inpatient hospitalization at [facility]. Mother did not , SW left message with all information and call back number. No further SW needs at this time.

## 2024-05-09 NOTE — ED PROVIDER NOTE - OBJECTIVE STATEMENT
29-year-old female schizophrenia.  She presents with her mother she states that she is having worsening voices in her head telling her to hurt other people.  She states they usually do not tell her to hurt herself.  She states she has not acted on any of this but she feels toward the voices are getting worse and they are weighing down on her.  She denies any physical complaints.  She takes Haldol 20 mg at night as well as Abilify 5 mg in the morning and has been on these medications for a very long time however she had been getting long-acting shots at some point and she states that she feels like the had been working better.    Will reach out to psychiatry will get basic screening labs mother #[phone] Priyanka

## 2024-05-09 NOTE — BH PATIENT PROFILE - HOME MEDICATIONS
Carafate 1 g oral tablet , 1 tab(s) orally every 6 hours As needed for acidity  Pepcid 20 mg oral tablet , 1 tab(s) orally 2 times a day  cefuroxime 500 mg oral tablet , 1 tab(s) orally 2 times a day  ondansetron 4 mg oral tablet, disintegrating , 1 tab(s) orally every 8 hours, As Needed -for nausea    mg oral tablet , 1 tab(s) orally every 6 hours   Alavert 10 mg oral tablet , 1 tab(s) orally once a day   Tessalon Perles 100 mg oral capsule , 1 cap(s) orally 1 to 3 times a day, As Needed  Keflex 500 mg oral capsule , 1 cap(s) orally 3 times a day   Anusol-HC 25 mg rectal suppository , 1 suppository(ies) rectally 2 times a day   rolling walker  Advil Liquigels Minis 200 mg oral capsule , 2 cap(s) orally 3 times a day   famotidine 20 mg oral tablet , 1  orally once a day   Actamin 325 mg oral tablet , 2 tab(s) orally every 6 hours, As needed, Mild Pain (1 - 3), Moderate Pain (4 - 6)  Haldol 5 mg oral tablet , 1 tab(s) orally once a day  Haldol 10 mg oral tablet , 1 tab(s) orally once a day (at bedtime)

## 2024-05-09 NOTE — ED ADULT NURSE REASSESSMENT NOTE - NS ED NURSE REASSESS COMMENT FT1
Pt appears to be resting comfortably. NAD, respirations are even and unlabored. No complaints at this moment. Pt boarding, awaiting for bed assignment. Safety precautions implemented as per protocol, awaiting further MD orders, plan of care ongoing.

## 2024-05-09 NOTE — ED BEHAVIORAL HEALTH ASSESSMENT NOTE - ACCOMPANIED BY
Family member left distal wrist contusion Rest hand, wear velcro brace except to shower and sleep  Ice 10 minutes every 4 hours as needed for pain/swelling.  Elevate when seated  tylenol 500 mg every 6 hours as needed for pain, no more than 4 doses per day.  Follow up with your primary doctor within 48-72 hours.  Recommend ortho follow up within the week. Referral list provided.   Return to ER for worsening pain, swelling, numbness, weakness, discoloration, or if you have any new or changing symptoms or concerns

## 2024-05-09 NOTE — BH INPATIENT PSYCHIATRY ASSESSMENT NOTE - NSBHCHARTREVIEWVS_PSY_A_CORE FT
Vital Signs Last 24 Hrs  T(C): 36.6 (05-09-24 @ 13:35), Max: 36.9 (05-09-24 @ 12:29)  T(F): 97.9 (05-09-24 @ 13:35), Max: 98.4 (05-09-24 @ 12:29)  HR: 80 (05-09-24 @ 12:29) (80 - 92)  BP: 114/80 (05-09-24 @ 12:29) (114/80 - 127/79)  BP(mean): 79 (05-09-24 @ 06:35) (79 - 79)  RR: 16 (05-09-24 @ 13:35) (16 - 17)  SpO2: 100% (05-09-24 @ 13:35) (98% - 100%)    Orthostatic VS  05-09-24 @ 13:35  Lying BP: --/-- HR: --  Sitting BP: 116/69 HR: 79  Standing BP: 103/74 HR: 94  Site: --  Mode: --

## 2024-05-10 LAB
CULTURE RESULTS: SIGNIFICANT CHANGE UP
SPECIMEN SOURCE: SIGNIFICANT CHANGE UP

## 2024-05-10 PROCEDURE — 99232 SBSQ HOSP IP/OBS MODERATE 35: CPT

## 2024-05-10 RX ADMIN — ARIPIPRAZOLE 5 MILLIGRAM(S): 15 TABLET ORAL at 08:44

## 2024-05-10 RX ADMIN — HALOPERIDOL DECANOATE 20 MILLIGRAM(S): 100 INJECTION INTRAMUSCULAR at 20:15

## 2024-05-10 NOTE — BH INPATIENT PSYCHIATRY PROGRESS NOTE - NSBHASSESSSUMMFT_PSY_ALL_CORE
28 yo woman, on SSD, PPHx ASD w/ schizophrenia, 8 previous admissions , multiple ED presentations, no hx of suicide attempts, history of aggression in the past (hitting), PMH of obesity, pre-diabetes who in treatment at [facility], last seen on 12/26/23, bib today by her mom for worsening of +ah . Endorses CAH at baseline, however reports worsening CAH to hurt others lately, denies intent, help seeking and future oriented for treatment, with plans to transition to Haldol ZHAO.     Plan:  -c/w home Haldol 20 mg qhs - plans to transition to ZHAO   -c/w abilify 5 mg qd

## 2024-05-10 NOTE — PSYCHIATRIC REHAB INITIAL EVALUATION - NSBHALCSUBTREAT_PSY_ALL_CORE
Pt stated she is currently in treatment with [facility], but not seeing a psychiatrist over there./Outpatient clinic (specify)

## 2024-05-10 NOTE — BH SOCIAL WORK INITIAL PSYCHOSOCIAL EVALUATION - OTHER PAST PSYCHIATRIC HISTORY (INCLUDE DETAILS REGARDING ONSET, COURSE OF ILLNESS, INPATIENT/OUTPATIENT TREATMENT)
28 yo woman, lives with mother, on SSD, PPHx ASD w/ schizophrenia, 8 previous admissions , multiple ED presentations, no hx of suicide attempts, history of aggression in the past (hitting), PMH of obesity, pre-diabetes, in tx at [facility] bib today by her mom for worsening of +ah .     Pt reports she is hearing more voices and the voices are louder than usual, "telling to hurt others, anyone". She is also reporting the  voices are negative and laugh at her "they tell me to be like them". Pt denies she wants to act on the voices , denies any si/hi . She states she has ah and vh at baseline but finds that she is seeing more people with pale skin and black clothing and red eyes " they are weighing down on me, making my knees and my stomach hurt" , with somatic preoccupations . Patient feels paranoid that people want to hurt her. Patient states she finds herself feeling depressed because of it and does not think her medications are working as before. She is endorsing poor appetite, low energy level, amotivation and not sleeping well. No manic sx. Patient reports taking medications     ED spoke with pts mom who reported that pt was complaining of stomach pain, but also feels it is tied up to the mental health issues, was reporting hearing more voices and mom noticed that pt was having conversation with herself more than usual. Patient has ah and vh at baseline . Patient is not eating well and does not sleep well at baseline . No verbalization of si/hi.   She is in  [facility] program attends 2x/week  , pt currently to Haldol 20mg qhs , Abilify 5mg qdaily . Pt was previously on the ZHAO Haldol and was doing well , currently not on it and mom feels she would benefit.    While on ML4, pt is calm, cooperative , in good behavioral control. Pt reports similar narrative as in the psych ED. Endorses worsening CAH to hurt others and are hypercritical and negativistic. Pt reports that they have been worsening at home without a clear trigger. Endorses that voices have already been improving since being on the unit, stating that when she is distracted and talking to others the voices become more attenuated. Pt endorses chronic VH at baseline of pale skinned people wearing black clothings with red eyes, denies current VH on the unit. Pt denies acute depressed mood, feeling brighter since time in ED. Pt denies SI/HI, future oriented, motivated to optimize psychiatric tx, reports doing best when she was on Haldol ZHAO and is very amenable to be restarted on this during hospital stay. Reports sleep has been erratic at home with good appetite. denies recent substances.    This writer met with pt who gave team permission to speak with her mother and [facility] and signed consents.  She was pleasant and cooperative.

## 2024-05-10 NOTE — BH INPATIENT PSYCHIATRY PROGRESS NOTE - NSBHMETABOLIC_PSY_ALL_CORE_FT
BMI: BMI (kg/m2): 36.7 (05-09-24 @ 13:35)  HbA1c:   Glucose: POCT Blood Glucose.: 98 mg/dL (05-09-24 @ 02:12)    BP: 114/80 (05-09-24 @ 12:29) (114/80 - 127/79)Vital Signs Last 24 Hrs  T(C): 36.8 (05-10-24 @ 06:41), Max: 36.9 (05-09-24 @ 12:29)  T(F): 98.2 (05-10-24 @ 06:41), Max: 98.4 (05-09-24 @ 12:29)  HR: 80 (05-09-24 @ 12:29) (80 - 80)  BP: 114/80 (05-09-24 @ 12:29) (114/80 - 114/80)  BP(mean): --  RR: 18 (05-10-24 @ 06:41) (16 - 18)  SpO2: 100% (05-09-24 @ 13:35) (98% - 100%)    Orthostatic VS  05-10-24 @ 06:41  Lying BP: --/-- HR: --  Sitting BP: 109/75 HR: 79  Standing BP: 104/66 HR: 97  Site: --  Mode: --  Orthostatic VS  05-09-24 @ 19:25  Lying BP: --/-- HR: --  Sitting BP: 137/95 HR: 77  Standing BP: 125/93 HR: 89  Site: --  Mode: --  Orthostatic VS  05-09-24 @ 13:35  Lying BP: --/-- HR: --  Sitting BP: 116/69 HR: 79  Standing BP: 103/74 HR: 94  Site: --  Mode: --    Lipid Panel:  BMI: BMI (kg/m2): 36.7 (05-09-24 @ 13:35)  HbA1c:   Glucose: POCT Blood Glucose.: 98 mg/dL (05-09-24 @ 02:12)    BP: 114/80 (05-09-24 @ 12:29) (114/80 - 127/79)Vital Signs Last 24 Hrs  T(C): 36.8 (05-10-24 @ 06:41), Max: 36.8 (05-09-24 @ 19:25)  T(F): 98.2 (05-10-24 @ 06:41), Max: 98.2 (05-09-24 @ 19:25)  HR: --  BP: --  BP(mean): --  RR: 18 (05-10-24 @ 06:41) (16 - 18)  SpO2: --    Orthostatic VS  05-10-24 @ 06:41  Lying BP: --/-- HR: --  Sitting BP: 109/75 HR: 79  Standing BP: 104/66 HR: 97  Site: --  Mode: --  Orthostatic VS  05-09-24 @ 19:25  Lying BP: --/-- HR: --  Sitting BP: 137/95 HR: 77  Standing BP: 125/93 HR: 89  Site: --  Mode: --  Orthostatic VS  05-09-24 @ 13:35  Lying BP: --/-- HR: --  Sitting BP: 116/69 HR: 79  Standing BP: 103/74 HR: 94  Site: --  Mode: --    Lipid Panel:

## 2024-05-10 NOTE — BH INPATIENT PSYCHIATRY PROGRESS NOTE - NSBHCHARTREVIEWVS_PSY_A_CORE FT
Vital Signs Last 24 Hrs  T(C): 36.8 (05-10-24 @ 06:41), Max: 36.9 (05-09-24 @ 12:29)  T(F): 98.2 (05-10-24 @ 06:41), Max: 98.4 (05-09-24 @ 12:29)  HR: 80 (05-09-24 @ 12:29) (80 - 80)  BP: 114/80 (05-09-24 @ 12:29) (114/80 - 114/80)  BP(mean): --  RR: 18 (05-10-24 @ 06:41) (16 - 18)  SpO2: 100% (05-09-24 @ 13:35) (98% - 100%)    Orthostatic VS  05-10-24 @ 06:41  Lying BP: --/-- HR: --  Sitting BP: 109/75 HR: 79  Standing BP: 104/66 HR: 97  Site: --  Mode: --  Orthostatic VS  05-09-24 @ 19:25  Lying BP: --/-- HR: --  Sitting BP: 137/95 HR: 77  Standing BP: 125/93 HR: 89  Site: --  Mode: --  Orthostatic VS  05-09-24 @ 13:35  Lying BP: --/-- HR: --  Sitting BP: 116/69 HR: 79  Standing BP: 103/74 HR: 94  Site: --  Mode: --   Vital Signs Last 24 Hrs  T(C): 36.8 (05-10-24 @ 06:41), Max: 36.8 (05-09-24 @ 19:25)  T(F): 98.2 (05-10-24 @ 06:41), Max: 98.2 (05-09-24 @ 19:25)  HR: --  BP: --  BP(mean): --  RR: 18 (05-10-24 @ 06:41) (16 - 18)  SpO2: --    Orthostatic VS  05-10-24 @ 06:41  Lying BP: --/-- HR: --  Sitting BP: 109/75 HR: 79  Standing BP: 104/66 HR: 97  Site: --  Mode: --  Orthostatic VS  05-09-24 @ 19:25  Lying BP: --/-- HR: --  Sitting BP: 137/95 HR: 77  Standing BP: 125/93 HR: 89  Site: --  Mode: --  Orthostatic VS  05-09-24 @ 13:35  Lying BP: --/-- HR: --  Sitting BP: 116/69 HR: 79  Standing BP: 103/74 HR: 94  Site: --  Mode: --

## 2024-05-10 NOTE — PSYCHIATRIC REHAB INITIAL EVALUATION - NSBHPRRECOMMEND_PSY_ALL_CORE
Pt is a 28 y/o female, domiciled with family. Pt has hx of 8 prior psychiatric admissions. Pt reported hx of suicide gesture in her 20’s. Pt was admitted to [place] [address] due to psychotic symptom. Pt endorsed AH reported voices has been telling her to harm other and telling her that he/she is better than her, richer than her and bigger than her. Pt endorsed [facility] and stated she sees people wears black clothing. Pt endorsed that people are out to get her. Pt endorsed depressed and anxious but denies other symptom. Pt denies substance use. As per pt, she used to follow up treatment with [facility] and last visited was in Dec 2024. Pt [facility], but did not see psychiatrist over there. Chart indicated, pt used to receive Haldol ZHAO, and currently not on it and mom feels she would benefit from it.     Writer and NP met with pt together, and introduced self and role on the unit. Writer oriented pt with psychiatric rehabilitation department’s function, unit programming and daily activities. Writer provided pt with a copy of unit schedule and psychiatric rehabilitation welcome letter. Writer encouraged pt to attend daily symptom management groups.     During this assessment, pt is verbal, cooperative, pleasant. Pt gave verbal consent for treatment team to contact her mother Michelle [phone].

## 2024-05-11 PROCEDURE — 99232 SBSQ HOSP IP/OBS MODERATE 35: CPT

## 2024-05-11 RX ADMIN — HALOPERIDOL DECANOATE 20 MILLIGRAM(S): 100 INJECTION INTRAMUSCULAR at 20:25

## 2024-05-11 RX ADMIN — ARIPIPRAZOLE 5 MILLIGRAM(S): 15 TABLET ORAL at 08:46

## 2024-05-11 NOTE — BH INPATIENT PSYCHIATRY PROGRESS NOTE - NSBHCHARTREVIEWVS_PSY_A_CORE FT
Vital Signs Last 24 Hrs  T(C): 36.7 (05-10-24 @ 19:20), Max: 36.7 (05-10-24 @ 19:20)  T(F): 98 (05-10-24 @ 19:20), Max: 98 (05-10-24 @ 19:20)  HR: --  BP: --  BP(mean): --  RR: 16 (05-10-24 @ 20:46) (16 - 16)  SpO2: --    Orthostatic VS  05-10-24 @ 19:20  Lying BP: --/-- HR: --  Sitting BP: 140/78 HR: 94  Standing BP: 120/77 HR: 105  Site: --  Mode: --  Orthostatic VS  05-10-24 @ 06:41  Lying BP: --/-- HR: --  Sitting BP: 109/75 HR: 79  Standing BP: 104/66 HR: 97  Site: --  Mode: --  Orthostatic VS  05-09-24 @ 19:25  Lying BP: --/-- HR: --  Sitting BP: 137/95 HR: 77  Standing BP: 125/93 HR: 89  Site: --  Mode: --  Orthostatic VS  05-09-24 @ 13:35  Lying BP: --/-- HR: --  Sitting BP: 116/69 HR: 79  Standing BP: 103/74 HR: 94  Site: --  Mode: --

## 2024-05-11 NOTE — BH INPATIENT PSYCHIATRY PROGRESS NOTE - NSBHASSESSSUMMFT_PSY_ALL_CORE
30 yo woman, on SSD, PPHx ASD w/ schizophrenia, 8 previous admissions , multiple ED presentations, no hx of suicide attempts, history of aggression in the past (hitting), PMH of obesity, pre-diabetes who in treatment at [facility], last seen on 12/26/23, bib today by her mom for worsening of +ah . Endorses CAH at baseline, however reports worsening CAH to hurt others lately, denies intent, help seeking and future oriented for treatment, with plans to transition to Haldol ZHAO.     Plan:  -c/w home Haldol 20 mg qhs - plans to transition to ZHAO   -c/w abilify 5 mg qd

## 2024-05-11 NOTE — BH INPATIENT PSYCHIATRY PROGRESS NOTE - NSBHMETABOLIC_PSY_ALL_CORE_FT
BMI: BMI (kg/m2): 36.7 (05-09-24 @ 13:35)  HbA1c:   Glucose: POCT Blood Glucose.: 98 mg/dL (05-09-24 @ 02:12)    BP: 114/80 (05-09-24 @ 12:29) (114/80 - 127/79)Vital Signs Last 24 Hrs  T(C): 36.7 (05-10-24 @ 19:20), Max: 36.7 (05-10-24 @ 19:20)  T(F): 98 (05-10-24 @ 19:20), Max: 98 (05-10-24 @ 19:20)  HR: --  BP: --  BP(mean): --  RR: 16 (05-10-24 @ 20:46) (16 - 16)  SpO2: --    Orthostatic VS  05-10-24 @ 19:20  Lying BP: --/-- HR: --  Sitting BP: 140/78 HR: 94  Standing BP: 120/77 HR: 105  Site: --  Mode: --  Orthostatic VS  05-10-24 @ 06:41  Lying BP: --/-- HR: --  Sitting BP: 109/75 HR: 79  Standing BP: 104/66 HR: 97  Site: --  Mode: --  Orthostatic VS  05-09-24 @ 19:25  Lying BP: --/-- HR: --  Sitting BP: 137/95 HR: 77  Standing BP: 125/93 HR: 89  Site: --  Mode: --  Orthostatic VS  05-09-24 @ 13:35  Lying BP: --/-- HR: --  Sitting BP: 116/69 HR: 79  Standing BP: 103/74 HR: 94  Site: --  Mode: --    Lipid Panel:

## 2024-05-12 PROCEDURE — 99232 SBSQ HOSP IP/OBS MODERATE 35: CPT

## 2024-05-12 RX ADMIN — ARIPIPRAZOLE 5 MILLIGRAM(S): 15 TABLET ORAL at 08:47

## 2024-05-12 RX ADMIN — HALOPERIDOL DECANOATE 20 MILLIGRAM(S): 100 INJECTION INTRAMUSCULAR at 20:13

## 2024-05-12 NOTE — BH INPATIENT PSYCHIATRY PROGRESS NOTE - NSBHCHARTREVIEWVS_PSY_A_CORE FT
Vital Signs Last 24 Hrs  T(C): 36.6 (05-11-24 @ 19:43), Max: 36.7 (05-11-24 @ 09:00)  T(F): 97.9 (05-11-24 @ 19:43), Max: 98 (05-11-24 @ 09:00)  HR: --  BP: --  BP(mean): --  RR: 18 (05-11-24 @ 09:00) (18 - 18)  SpO2: --    Orthostatic VS  05-11-24 @ 19:43  Lying BP: --/-- HR: --  Sitting BP: 121/81 HR: 90  Standing BP: 129/82 HR: 112  Site: --  Mode: --  Orthostatic VS  05-11-24 @ 09:00  Lying BP: --/-- HR: --  Sitting BP: 106/71 HR: 80  Standing BP: 105/64 HR: 102  Site: --  Mode: electronic  Orthostatic VS  05-10-24 @ 19:20  Lying BP: --/-- HR: --  Sitting BP: 140/78 HR: 94  Standing BP: 120/77 HR: 105  Site: --  Mode: --

## 2024-05-12 NOTE — BH INPATIENT PSYCHIATRY PROGRESS NOTE - NSBHMETABOLIC_PSY_ALL_CORE_FT
BMI: BMI (kg/m2): 37.2 (05-11-24 @ 17:33)  HbA1c:   Glucose: POCT Blood Glucose.: 98 mg/dL (05-09-24 @ 02:12)    BP: 114/80 (05-09-24 @ 12:29) (114/80 - 114/81)Vital Signs Last 24 Hrs  T(C): 36.6 (05-11-24 @ 19:43), Max: 36.7 (05-11-24 @ 09:00)  T(F): 97.9 (05-11-24 @ 19:43), Max: 98 (05-11-24 @ 09:00)  HR: --  BP: --  BP(mean): --  RR: 18 (05-11-24 @ 09:00) (18 - 18)  SpO2: --    Orthostatic VS  05-11-24 @ 19:43  Lying BP: --/-- HR: --  Sitting BP: 121/81 HR: 90  Standing BP: 129/82 HR: 112  Site: --  Mode: --  Orthostatic VS  05-11-24 @ 09:00  Lying BP: --/-- HR: --  Sitting BP: 106/71 HR: 80  Standing BP: 105/64 HR: 102  Site: --  Mode: electronic  Orthostatic VS  05-10-24 @ 19:20  Lying BP: --/-- HR: --  Sitting BP: 140/78 HR: 94  Standing BP: 120/77 HR: 105  Site: --  Mode: --    Lipid Panel:

## 2024-05-12 NOTE — BH INPATIENT PSYCHIATRY PROGRESS NOTE - NSBHASSESSSUMMFT_PSY_ALL_CORE
30 yo woman, on SSD, PPHx ASD w/ schizophrenia, 8 previous admissions , multiple ED presentations, no hx of suicide attempts, history of aggression in the past (hitting), PMH of obesity, pre-diabetes who in treatment at [facility], last seen on 12/26/23, bib today by her mom for worsening of +ah . Endorses CAH at baseline, however reports worsening CAH to hurt others lately, denies intent, help seeking and future oriented for treatment, with plans to transition to Haldol ZHAO.     Plan:  -c/w home Haldol 20 mg qhs - plans to transition to ZHAO   -c/w abilify 5 mg qd

## 2024-05-13 LAB — SODIUM SERPL-SCNC: 141 MMOL/L — SIGNIFICANT CHANGE UP (ref 135–145)

## 2024-05-13 PROCEDURE — 99232 SBSQ HOSP IP/OBS MODERATE 35: CPT

## 2024-05-13 RX ADMIN — HALOPERIDOL DECANOATE 20 MILLIGRAM(S): 100 INJECTION INTRAMUSCULAR at 20:37

## 2024-05-13 RX ADMIN — ARIPIPRAZOLE 5 MILLIGRAM(S): 15 TABLET ORAL at 08:14

## 2024-05-13 NOTE — BH INPATIENT PSYCHIATRY PROGRESS NOTE - NSBHMETABOLIC_PSY_ALL_CORE_FT
BMI: BMI (kg/m2): 37.2 (05-11-24 @ 17:33)  HbA1c:   Glucose: POCT Blood Glucose.: 98 mg/dL (05-09-24 @ 02:12)    BP: --Vital Signs Last 24 Hrs  T(C): 36.8 (05-12-24 @ 19:41), Max: 36.8 (05-12-24 @ 19:41)  T(F): 98.2 (05-12-24 @ 19:41), Max: 98.2 (05-12-24 @ 19:41)  HR: --  BP: --  BP(mean): --  RR: 18 (05-12-24 @ 23:06) (18 - 18)  SpO2: --    Orthostatic VS  05-12-24 @ 19:41  Lying BP: --/-- HR: --  Sitting BP: 128/75 HR: 85  Standing BP: 113/72 HR: 93  Site: --  Mode: electronic  Orthostatic VS  05-12-24 @ 09:02  Lying BP: --/-- HR: --  Sitting BP: 111/57 HR: 83  Standing BP: 105/62 HR: 113  Site: --  Mode: electronic  Orthostatic VS  05-11-24 @ 19:43  Lying BP: --/-- HR: --  Sitting BP: 121/81 HR: 90  Standing BP: 129/82 HR: 112  Site: --  Mode: --  Orthostatic VS  05-11-24 @ 09:00  Lying BP: --/-- HR: --  Sitting BP: 106/71 HR: 80  Standing BP: 105/64 HR: 102  Site: --  Mode: electronic    Lipid Panel:  BMI: BMI (kg/m2): 37.2 (05-11-24 @ 17:33)  HbA1c:   Glucose: POCT Blood Glucose.: 98 mg/dL (05-09-24 @ 02:12)    BP: --Vital Signs Last 24 Hrs  T(C): 36.5 (05-13-24 @ 08:28), Max: 36.8 (05-12-24 @ 19:41)  T(F): 97.7 (05-13-24 @ 08:28), Max: 98.2 (05-12-24 @ 19:41)  HR: --  BP: --  BP(mean): --  RR: 18 (05-12-24 @ 23:06) (18 - 18)  SpO2: --    Orthostatic VS  05-13-24 @ 08:28  Lying BP: --/-- HR: --  Sitting BP: 102/74 HR: 81  Standing BP: 103/69 HR: 123  Site: --  Mode: --  Orthostatic VS  05-12-24 @ 19:41  Lying BP: --/-- HR: --  Sitting BP: 128/75 HR: 85  Standing BP: 113/72 HR: 93  Site: --  Mode: electronic  Orthostatic VS  05-12-24 @ 09:02  Lying BP: --/-- HR: --  Sitting BP: 111/57 HR: 83  Standing BP: 105/62 HR: 113  Site: --  Mode: electronic  Orthostatic VS  05-11-24 @ 19:43  Lying BP: --/-- HR: --  Sitting BP: 121/81 HR: 90  Standing BP: 129/82 HR: 112  Site: --  Mode: --    Lipid Panel:  BMI: BMI (kg/m2): 37.2 (05-11-24 @ 17:33)  HbA1c:   Glucose: POCT Blood Glucose.: 98 mg/dL (05-09-24 @ 02:12)    BP: --Vital Signs Last 24 Hrs  T(C): 36.1 (05-14-24 @ 06:37), Max: 36.6 (05-13-24 @ 19:14)  T(F): 97 (05-14-24 @ 06:37), Max: 97.9 (05-13-24 @ 19:14)  HR: --  BP: --  BP(mean): --  RR: 18 (05-14-24 @ 10:34) (18 - 18)  SpO2: --    Orthostatic VS  05-14-24 @ 06:37  Lying BP: --/-- HR: --  Sitting BP: 121/66 HR: 115  Standing BP: 116/67 HR: 130  Site: upper left arm  Mode: electronic  Orthostatic VS  05-13-24 @ 19:14  Lying BP: --/-- HR: --  Sitting BP: 121/74 HR: 89  Standing BP: 116/75 HR: 117  Site: --  Mode: --  Orthostatic VS  05-13-24 @ 08:28  Lying BP: --/-- HR: --  Sitting BP: 102/74 HR: 81  Standing BP: 103/69 HR: 123  Site: --  Mode: --  Orthostatic VS  05-12-24 @ 19:41  Lying BP: --/-- HR: --  Sitting BP: 128/75 HR: 85  Standing BP: 113/72 HR: 93  Site: --  Mode: electronic    Lipid Panel:

## 2024-05-13 NOTE — BH INPATIENT PSYCHIATRY PROGRESS NOTE - NSBHCHARTREVIEWVS_PSY_A_CORE FT
Vital Signs Last 24 Hrs  T(C): 36.8 (05-12-24 @ 19:41), Max: 36.8 (05-12-24 @ 19:41)  T(F): 98.2 (05-12-24 @ 19:41), Max: 98.2 (05-12-24 @ 19:41)  HR: --  BP: --  BP(mean): --  RR: 18 (05-12-24 @ 23:06) (18 - 18)  SpO2: --    Orthostatic VS  05-12-24 @ 19:41  Lying BP: --/-- HR: --  Sitting BP: 128/75 HR: 85  Standing BP: 113/72 HR: 93  Site: --  Mode: electronic  Orthostatic VS  05-12-24 @ 09:02  Lying BP: --/-- HR: --  Sitting BP: 111/57 HR: 83  Standing BP: 105/62 HR: 113  Site: --  Mode: electronic  Orthostatic VS  05-11-24 @ 19:43  Lying BP: --/-- HR: --  Sitting BP: 121/81 HR: 90  Standing BP: 129/82 HR: 112  Site: --  Mode: --  Orthostatic VS  05-11-24 @ 09:00  Lying BP: --/-- HR: --  Sitting BP: 106/71 HR: 80  Standing BP: 105/64 HR: 102  Site: --  Mode: electronic   Vital Signs Last 24 Hrs  T(C): 36.5 (05-13-24 @ 08:28), Max: 36.8 (05-12-24 @ 19:41)  T(F): 97.7 (05-13-24 @ 08:28), Max: 98.2 (05-12-24 @ 19:41)  HR: --  BP: --  BP(mean): --  RR: 18 (05-12-24 @ 23:06) (18 - 18)  SpO2: --    Orthostatic VS  05-13-24 @ 08:28  Lying BP: --/-- HR: --  Sitting BP: 102/74 HR: 81  Standing BP: 103/69 HR: 123  Site: --  Mode: --  Orthostatic VS  05-12-24 @ 19:41  Lying BP: --/-- HR: --  Sitting BP: 128/75 HR: 85  Standing BP: 113/72 HR: 93  Site: --  Mode: electronic  Orthostatic VS  05-12-24 @ 09:02  Lying BP: --/-- HR: --  Sitting BP: 111/57 HR: 83  Standing BP: 105/62 HR: 113  Site: --  Mode: electronic  Orthostatic VS  05-11-24 @ 19:43  Lying BP: --/-- HR: --  Sitting BP: 121/81 HR: 90  Standing BP: 129/82 HR: 112  Site: --  Mode: --   Vital Signs Last 24 Hrs  T(C): 36.1 (05-14-24 @ 06:37), Max: 36.6 (05-13-24 @ 19:14)  T(F): 97 (05-14-24 @ 06:37), Max: 97.9 (05-13-24 @ 19:14)  HR: --  BP: --  BP(mean): --  RR: 18 (05-14-24 @ 10:34) (18 - 18)  SpO2: --    Orthostatic VS  05-14-24 @ 06:37  Lying BP: --/-- HR: --  Sitting BP: 121/66 HR: 115  Standing BP: 116/67 HR: 130  Site: upper left arm  Mode: electronic  Orthostatic VS  05-13-24 @ 19:14  Lying BP: --/-- HR: --  Sitting BP: 121/74 HR: 89  Standing BP: 116/75 HR: 117  Site: --  Mode: --  Orthostatic VS  05-13-24 @ 08:28  Lying BP: --/-- HR: --  Sitting BP: 102/74 HR: 81  Standing BP: 103/69 HR: 123  Site: --  Mode: --  Orthostatic VS  05-12-24 @ 19:41  Lying BP: --/-- HR: --  Sitting BP: 128/75 HR: 85  Standing BP: 113/72 HR: 93  Site: --  Mode: electronic

## 2024-05-13 NOTE — BH INPATIENT PSYCHIATRY PROGRESS NOTE - NSBHASSESSSUMMFT_PSY_ALL_CORE
28 yo woman, on SSD, PPHx ASD w/ schizophrenia, 8 previous admissions , multiple ED presentations, no hx of suicide attempts, history of aggression in the past (hitting), PMH of obesity, pre-diabetes who in treatment at [facility], last seen on 12/26/23, bib today by her mom for worsening of +ah . Endorses CAH at baseline, however reports worsening CAH to hurt others lately, denies intent, help seeking and future oriented for treatment, with plans to transition to Haldol ZHAO.     Plan:  -c/w home Haldol 20 mg qhs - plans to transition to ZHAO   -c/w abilify 5 mg qd

## 2024-05-14 PROCEDURE — 99232 SBSQ HOSP IP/OBS MODERATE 35: CPT

## 2024-05-14 RX ADMIN — Medication 2 MILLIGRAM(S): at 05:54

## 2024-05-14 RX ADMIN — ARIPIPRAZOLE 5 MILLIGRAM(S): 15 TABLET ORAL at 08:36

## 2024-05-14 RX ADMIN — HALOPERIDOL DECANOATE 20 MILLIGRAM(S): 100 INJECTION INTRAMUSCULAR at 20:14

## 2024-05-14 RX ADMIN — HALOPERIDOL DECANOATE 2 MILLIGRAM(S): 100 INJECTION INTRAMUSCULAR at 05:54

## 2024-05-14 NOTE — BH INPATIENT PSYCHIATRY PROGRESS NOTE - NSBHMETABOLIC_PSY_ALL_CORE_FT
BMI: BMI (kg/m2): 37.2 (05-11-24 @ 17:33)  HbA1c:   Glucose: POCT Blood Glucose.: 98 mg/dL (05-09-24 @ 02:12)    BP: --Vital Signs Last 24 Hrs  T(C): 36.1 (05-14-24 @ 06:37), Max: 36.6 (05-13-24 @ 19:14)  T(F): 97 (05-14-24 @ 06:37), Max: 97.9 (05-13-24 @ 19:14)  HR: --  BP: --  BP(mean): --  RR: 18 (05-13-24 @ 21:05) (18 - 18)  SpO2: --    Orthostatic VS  05-14-24 @ 06:37  Lying BP: --/-- HR: --  Sitting BP: 121/66 HR: 115  Standing BP: 116/67 HR: 130  Site: upper left arm  Mode: electronic  Orthostatic VS  05-13-24 @ 19:14  Lying BP: --/-- HR: --  Sitting BP: 121/74 HR: 89  Standing BP: 116/75 HR: 117  Site: --  Mode: --  Orthostatic VS  05-13-24 @ 08:28  Lying BP: --/-- HR: --  Sitting BP: 102/74 HR: 81  Standing BP: 103/69 HR: 123  Site: --  Mode: --  Orthostatic VS  05-12-24 @ 19:41  Lying BP: --/-- HR: --  Sitting BP: 128/75 HR: 85  Standing BP: 113/72 HR: 93  Site: --  Mode: electronic  Orthostatic VS  05-12-24 @ 09:02  Lying BP: --/-- HR: --  Sitting BP: 111/57 HR: 83  Standing BP: 105/62 HR: 113  Site: --  Mode: electronic    Lipid Panel:  BMI: BMI (kg/m2): 37.2 (05-11-24 @ 17:33)  HbA1c:   Glucose: POCT Blood Glucose.: 98 mg/dL (05-09-24 @ 02:12)    BP: --Vital Signs Last 24 Hrs  T(C): 36.1 (05-14-24 @ 06:37), Max: 36.6 (05-13-24 @ 19:14)  T(F): 97 (05-14-24 @ 06:37), Max: 97.9 (05-13-24 @ 19:14)  HR: --  BP: --  BP(mean): --  RR: 18 (05-14-24 @ 10:34) (18 - 18)  SpO2: --    Orthostatic VS  05-14-24 @ 06:37  Lying BP: --/-- HR: --  Sitting BP: 121/66 HR: 115  Standing BP: 116/67 HR: 130  Site: upper left arm  Mode: electronic  Orthostatic VS  05-13-24 @ 19:14  Lying BP: --/-- HR: --  Sitting BP: 121/74 HR: 89  Standing BP: 116/75 HR: 117  Site: --  Mode: --  Orthostatic VS  05-13-24 @ 08:28  Lying BP: --/-- HR: --  Sitting BP: 102/74 HR: 81  Standing BP: 103/69 HR: 123  Site: --  Mode: --  Orthostatic VS  05-12-24 @ 19:41  Lying BP: --/-- HR: --  Sitting BP: 128/75 HR: 85  Standing BP: 113/72 HR: 93  Site: --  Mode: electronic    Lipid Panel:

## 2024-05-14 NOTE — BH INPATIENT PSYCHIATRY PROGRESS NOTE - NSBHASSESSSUMMFT_PSY_ALL_CORE
30 yo woman, on SSD, PPHx ASD w/ schizophrenia, 8 previous admissions , multiple ED presentations, no hx of suicide attempts, history of aggression in the past (hitting), PMH of obesity, pre-diabetes who in treatment at [facility], last seen on 12/26/23, bib today by her mom for worsening of +ah . Endorses CAH at baseline, however reports worsening CAH to hurt others lately, denies intent, help seeking and future oriented for treatment, with plans to transition to Haldol ZHAO.     Plan:  -c/w home Haldol 20 mg qhs - plans to transition to ZHAO   -c/w abilify 5 mg qd

## 2024-05-14 NOTE — BH INPATIENT PSYCHIATRY PROGRESS NOTE - NSBHCHARTREVIEWVS_PSY_A_CORE FT
Vital Signs Last 24 Hrs  T(C): 36.1 (05-14-24 @ 06:37), Max: 36.6 (05-13-24 @ 19:14)  T(F): 97 (05-14-24 @ 06:37), Max: 97.9 (05-13-24 @ 19:14)  HR: --  BP: --  BP(mean): --  RR: 18 (05-13-24 @ 21:05) (18 - 18)  SpO2: --    Orthostatic VS  05-14-24 @ 06:37  Lying BP: --/-- HR: --  Sitting BP: 121/66 HR: 115  Standing BP: 116/67 HR: 130  Site: upper left arm  Mode: electronic  Orthostatic VS  05-13-24 @ 19:14  Lying BP: --/-- HR: --  Sitting BP: 121/74 HR: 89  Standing BP: 116/75 HR: 117  Site: --  Mode: --  Orthostatic VS  05-13-24 @ 08:28  Lying BP: --/-- HR: --  Sitting BP: 102/74 HR: 81  Standing BP: 103/69 HR: 123  Site: --  Mode: --  Orthostatic VS  05-12-24 @ 19:41  Lying BP: --/-- HR: --  Sitting BP: 128/75 HR: 85  Standing BP: 113/72 HR: 93  Site: --  Mode: electronic  Orthostatic VS  05-12-24 @ 09:02  Lying BP: --/-- HR: --  Sitting BP: 111/57 HR: 83  Standing BP: 105/62 HR: 113  Site: --  Mode: electronic   Vital Signs Last 24 Hrs  T(C): 36.1 (05-14-24 @ 06:37), Max: 36.6 (05-13-24 @ 19:14)  T(F): 97 (05-14-24 @ 06:37), Max: 97.9 (05-13-24 @ 19:14)  HR: --  BP: --  BP(mean): --  RR: 18 (05-14-24 @ 10:34) (18 - 18)  SpO2: --    Orthostatic VS  05-14-24 @ 06:37  Lying BP: --/-- HR: --  Sitting BP: 121/66 HR: 115  Standing BP: 116/67 HR: 130  Site: upper left arm  Mode: electronic  Orthostatic VS  05-13-24 @ 19:14  Lying BP: --/-- HR: --  Sitting BP: 121/74 HR: 89  Standing BP: 116/75 HR: 117  Site: --  Mode: --  Orthostatic VS  05-13-24 @ 08:28  Lying BP: --/-- HR: --  Sitting BP: 102/74 HR: 81  Standing BP: 103/69 HR: 123  Site: --  Mode: --  Orthostatic VS  05-12-24 @ 19:41  Lying BP: --/-- HR: --  Sitting BP: 128/75 HR: 85  Standing BP: 113/72 HR: 93  Site: --  Mode: electronic

## 2024-05-15 PROCEDURE — 99232 SBSQ HOSP IP/OBS MODERATE 35: CPT

## 2024-05-15 RX ORDER — ARIPIPRAZOLE 15 MG/1
7 TABLET ORAL DAILY
Refills: 0 | Status: DISCONTINUED | OUTPATIENT
Start: 2024-05-16 | End: 2024-05-22

## 2024-05-15 RX ADMIN — ARIPIPRAZOLE 5 MILLIGRAM(S): 15 TABLET ORAL at 08:50

## 2024-05-15 RX ADMIN — HALOPERIDOL DECANOATE 20 MILLIGRAM(S): 100 INJECTION INTRAMUSCULAR at 20:19

## 2024-05-15 NOTE — BH INPATIENT PSYCHIATRY PROGRESS NOTE - NSBHCHARTREVIEWVS_PSY_A_CORE FT
Vital Signs Last 24 Hrs  T(C): 36.4 (05-15-24 @ 08:30), Max: 36.8 (05-14-24 @ 19:34)  T(F): 97.6 (05-15-24 @ 08:30), Max: 98.2 (05-14-24 @ 19:34)  HR: --  BP: --  BP(mean): --  RR: --  SpO2: --    Orthostatic VS  05-15-24 @ 08:30  Lying BP: --/-- HR: --  Sitting BP: 109/74 HR: 80  Standing BP: 98/71 HR: 93  Site: --  Mode: --  Orthostatic VS  05-14-24 @ 19:34  Lying BP: --/-- HR: --  Sitting BP: 116/77 HR: 100  Standing BP: 115/70 HR: 120  Site: --  Mode: --  Orthostatic VS  05-14-24 @ 06:37  Lying BP: --/-- HR: --  Sitting BP: 121/66 HR: 115  Standing BP: 116/67 HR: 130  Site: upper left arm  Mode: electronic  Orthostatic VS  05-13-24 @ 19:14  Lying BP: --/-- HR: --  Sitting BP: 121/74 HR: 89  Standing BP: 116/75 HR: 117  Site: --  Mode: --   Vital Signs Last 24 Hrs  T(C): 36.9 (05-15-24 @ 19:48), Max: 36.9 (05-15-24 @ 19:48)  T(F): 98.4 (05-15-24 @ 19:48), Max: 98.4 (05-15-24 @ 19:48)  HR: --  BP: --  BP(mean): --  RR: 16 (05-15-24 @ 08:30) (16 - 16)  SpO2: --    Orthostatic VS  05-15-24 @ 19:48  Lying BP: --/-- HR: --  Sitting BP: 112/69 HR: 82  Standing BP: 116/72 HR: 85  Site: --  Mode: --  Orthostatic VS  05-15-24 @ 08:30  Lying BP: --/-- HR: --  Sitting BP: 109/74 HR: 80  Standing BP: 98/71 HR: 93  Site: --  Mode: --  Orthostatic VS  05-14-24 @ 19:34  Lying BP: --/-- HR: --  Sitting BP: 116/77 HR: 100  Standing BP: 115/70 HR: 120  Site: --  Mode: --  Orthostatic VS  05-14-24 @ 06:37  Lying BP: --/-- HR: --  Sitting BP: 121/66 HR: 115  Standing BP: 116/67 HR: 130  Site: upper left arm  Mode: electronic

## 2024-05-15 NOTE — BH INPATIENT PSYCHIATRY PROGRESS NOTE - CURRENT MEDICATION
MEDICATIONS  (STANDING):  ARIPiprazole 5 milliGRAM(s) Oral daily  haloperidol     Tablet 20 milliGRAM(s) Oral at bedtime    MEDICATIONS  (PRN):  haloperidol     Tablet 2 milliGRAM(s) Oral every 6 hours PRN agitation  haloperidol    Injectable 2 milliGRAM(s) IntraMuscular once PRN agitation  LORazepam     Tablet 2 milliGRAM(s) Oral every 6 hours PRN agitation  LORazepam   Injectable 2 milliGRAM(s) IntraMuscular once PRN Agitation   MEDICATIONS  (STANDING):  haloperidol     Tablet 20 milliGRAM(s) Oral at bedtime    MEDICATIONS  (PRN):  haloperidol     Tablet 2 milliGRAM(s) Oral every 6 hours PRN agitation  haloperidol    Injectable 2 milliGRAM(s) IntraMuscular once PRN agitation  LORazepam     Tablet 2 milliGRAM(s) Oral every 6 hours PRN agitation  LORazepam   Injectable 2 milliGRAM(s) IntraMuscular once PRN Agitation

## 2024-05-15 NOTE — BH INPATIENT PSYCHIATRY PROGRESS NOTE - NSBHASSESSSUMMFT_PSY_ALL_CORE
28 yo woman, on SSD, PPHx ASD w/ schizophrenia, 8 previous admissions , multiple ED presentations, no hx of suicide attempts, history of aggression in the past (hitting), PMH of obesity, pre-diabetes who in treatment at [facility], last seen on 12/26/23, bib today by her mom for worsening of +ah . Endorses CAH at baseline, however reports worsening CAH to hurt others lately, denies intent, help seeking and future oriented for treatment, with plans to transition to Haldol [facility].     Plan:  -c/w home Haldol 20 mg qhs - plans to transition to ZHAO   -c/w abilify 5 mg qd

## 2024-05-15 NOTE — BH INPATIENT PSYCHIATRY PROGRESS NOTE - NSBHMETABOLIC_PSY_ALL_CORE_FT
BMI: BMI (kg/m2): 37.2 (05-11-24 @ 17:33)  HbA1c:   Glucose: POCT Blood Glucose.: 98 mg/dL (05-09-24 @ 02:12)    BP: --Vital Signs Last 24 Hrs  T(C): 36.4 (05-15-24 @ 08:30), Max: 36.8 (05-14-24 @ 19:34)  T(F): 97.6 (05-15-24 @ 08:30), Max: 98.2 (05-14-24 @ 19:34)  HR: --  BP: --  BP(mean): --  RR: --  SpO2: --    Orthostatic VS  05-15-24 @ 08:30  Lying BP: --/-- HR: --  Sitting BP: 109/74 HR: 80  Standing BP: 98/71 HR: 93  Site: --  Mode: --  Orthostatic VS  05-14-24 @ 19:34  Lying BP: --/-- HR: --  Sitting BP: 116/77 HR: 100  Standing BP: 115/70 HR: 120  Site: --  Mode: --  Orthostatic VS  05-14-24 @ 06:37  Lying BP: --/-- HR: --  Sitting BP: 121/66 HR: 115  Standing BP: 116/67 HR: 130  Site: upper left arm  Mode: electronic  Orthostatic VS  05-13-24 @ 19:14  Lying BP: --/-- HR: --  Sitting BP: 121/74 HR: 89  Standing BP: 116/75 HR: 117  Site: --  Mode: --    Lipid Panel:  BMI: BMI (kg/m2): 37.2 (05-11-24 @ 17:33)  HbA1c:   Glucose: POCT Blood Glucose.: 98 mg/dL (05-09-24 @ 02:12)    BP: --Vital Signs Last 24 Hrs  T(C): 36.9 (05-15-24 @ 19:48), Max: 36.9 (05-15-24 @ 19:48)  T(F): 98.4 (05-15-24 @ 19:48), Max: 98.4 (05-15-24 @ 19:48)  HR: --  BP: --  BP(mean): --  RR: 16 (05-15-24 @ 08:30) (16 - 16)  SpO2: --    Orthostatic VS  05-15-24 @ 19:48  Lying BP: --/-- HR: --  Sitting BP: 112/69 HR: 82  Standing BP: 116/72 HR: 85  Site: --  Mode: --  Orthostatic VS  05-15-24 @ 08:30  Lying BP: --/-- HR: --  Sitting BP: 109/74 HR: 80  Standing BP: 98/71 HR: 93  Site: --  Mode: --  Orthostatic VS  05-14-24 @ 19:34  Lying BP: --/-- HR: --  Sitting BP: 116/77 HR: 100  Standing BP: 115/70 HR: 120  Site: --  Mode: --  Orthostatic VS  05-14-24 @ 06:37  Lying BP: --/-- HR: --  Sitting BP: 121/66 HR: 115  Standing BP: 116/67 HR: 130  Site: upper left arm  Mode: electronic    Lipid Panel:

## 2024-05-16 PROCEDURE — 99232 SBSQ HOSP IP/OBS MODERATE 35: CPT

## 2024-05-16 RX ORDER — HALOPERIDOL DECANOATE 100 MG/ML
100 INJECTION INTRAMUSCULAR ONCE
Refills: 0 | Status: COMPLETED | OUTPATIENT
Start: 2024-05-16 | End: 2024-05-16

## 2024-05-16 RX ORDER — BENZTROPINE MESYLATE 1 MG
1 TABLET ORAL
Refills: 0 | Status: DISCONTINUED | OUTPATIENT
Start: 2024-05-16 | End: 2024-05-20

## 2024-05-16 RX ORDER — HALOPERIDOL DECANOATE 100 MG/ML
15 INJECTION INTRAMUSCULAR AT BEDTIME
Refills: 0 | Status: DISCONTINUED | OUTPATIENT
Start: 2024-05-16 | End: 2024-05-22

## 2024-05-16 RX ADMIN — ARIPIPRAZOLE 7 MILLIGRAM(S): 15 TABLET ORAL at 08:45

## 2024-05-16 RX ADMIN — HALOPERIDOL DECANOATE 15 MILLIGRAM(S): 100 INJECTION INTRAMUSCULAR at 20:31

## 2024-05-16 RX ADMIN — HALOPERIDOL DECANOATE 100 MILLIGRAM(S): 100 INJECTION INTRAMUSCULAR at 16:45

## 2024-05-16 RX ADMIN — Medication 1 MILLIGRAM(S): at 20:31

## 2024-05-16 NOTE — BH INPATIENT PSYCHIATRY PROGRESS NOTE - NSBHASSESSSUMMFT_PSY_ALL_CORE
28 yo woman, on SSD, PPHx ASD w/ schizophrenia, 8 previous admissions , multiple ED presentations, no hx of suicide attempts, history of aggression in the past (hitting), PMH of obesity, pre-diabetes who in treatment at [facility], last seen on 12/26/23, bib today by her mom for worsening of +ah . Endorses CAH at baseline, however reports worsening CAH to hurt others lately, denies intent, help seeking and future oriented for treatment, with plans to transition to Haldol ZHAO.     Plan:  -decrease haldol from 20mg to 15mg PO (taper by 25% each week), administer 100mg haldol dec 5/16  -c/w abilify 7mg qd

## 2024-05-16 NOTE — BH INPATIENT PSYCHIATRY PROGRESS NOTE - NSBHCHARTREVIEWVS_PSY_A_CORE FT
Vital Signs Last 24 Hrs  T(C): 36.5 (05-16-24 @ 08:36), Max: 36.9 (05-15-24 @ 19:48)  T(F): 97.7 (05-16-24 @ 08:36), Max: 98.4 (05-15-24 @ 19:48)  HR: --  BP: --  BP(mean): --  RR: 17 (05-16-24 @ 07:26) (17 - 18)  SpO2: --    Orthostatic VS  05-16-24 @ 08:36  Lying BP: --/-- HR: --  Sitting BP: 103/60 HR: 85  Standing BP: 102/61 HR: 97  Site: --  Mode: --  Orthostatic VS  05-15-24 @ 19:48  Lying BP: --/-- HR: --  Sitting BP: 112/69 HR: 82  Standing BP: 116/72 HR: 85  Site: --  Mode: --  Orthostatic VS  05-15-24 @ 08:30  Lying BP: --/-- HR: --  Sitting BP: 109/74 HR: 80  Standing BP: 98/71 HR: 93  Site: --  Mode: --  Orthostatic VS  05-14-24 @ 19:34  Lying BP: --/-- HR: --  Sitting BP: 116/77 HR: 100  Standing BP: 115/70 HR: 120  Site: --  Mode: --   Vital Signs Last 24 Hrs  T(C): 36.7 (05-16-24 @ 19:48), Max: 36.7 (05-16-24 @ 19:48)  T(F): 98.1 (05-16-24 @ 19:48), Max: 98.1 (05-16-24 @ 19:48)  HR: --  BP: --  BP(mean): --  RR: 18 (05-16-24 @ 20:53) (17 - 18)  SpO2: --    Orthostatic VS  05-16-24 @ 19:48  Lying BP: --/-- HR: --  Sitting BP: 122/85 HR: 91  Standing BP: 121/71 HR: 117  Site: --  Mode: --  Orthostatic VS  05-16-24 @ 08:36  Lying BP: --/-- HR: --  Sitting BP: 103/60 HR: 85  Standing BP: 102/61 HR: 97  Site: --  Mode: --  Orthostatic VS  05-15-24 @ 19:48  Lying BP: --/-- HR: --  Sitting BP: 112/69 HR: 82  Standing BP: 116/72 HR: 85  Site: --  Mode: --  Orthostatic VS  05-15-24 @ 08:30  Lying BP: --/-- HR: --  Sitting BP: 109/74 HR: 80  Standing BP: 98/71 HR: 93  Site: --  Mode: --

## 2024-05-16 NOTE — BH INPATIENT PSYCHIATRY PROGRESS NOTE - NSBHMETABOLIC_PSY_ALL_CORE_FT
BMI: BMI (kg/m2): 37.2 (05-11-24 @ 17:33)  HbA1c:   Glucose: POCT Blood Glucose.: 98 mg/dL (05-09-24 @ 02:12)    BP: --Vital Signs Last 24 Hrs  T(C): 36.5 (05-16-24 @ 08:36), Max: 36.9 (05-15-24 @ 19:48)  T(F): 97.7 (05-16-24 @ 08:36), Max: 98.4 (05-15-24 @ 19:48)  HR: --  BP: --  BP(mean): --  RR: 17 (05-16-24 @ 07:26) (17 - 18)  SpO2: --    Orthostatic VS  05-16-24 @ 08:36  Lying BP: --/-- HR: --  Sitting BP: 103/60 HR: 85  Standing BP: 102/61 HR: 97  Site: --  Mode: --  Orthostatic VS  05-15-24 @ 19:48  Lying BP: --/-- HR: --  Sitting BP: 112/69 HR: 82  Standing BP: 116/72 HR: 85  Site: --  Mode: --  Orthostatic VS  05-15-24 @ 08:30  Lying BP: --/-- HR: --  Sitting BP: 109/74 HR: 80  Standing BP: 98/71 HR: 93  Site: --  Mode: --  Orthostatic VS  05-14-24 @ 19:34  Lying BP: --/-- HR: --  Sitting BP: 116/77 HR: 100  Standing BP: 115/70 HR: 120  Site: --  Mode: --    Lipid Panel:  BMI: BMI (kg/m2): 37.2 (05-11-24 @ 17:33)  HbA1c:   Glucose: POCT Blood Glucose.: 98 mg/dL (05-09-24 @ 02:12)    BP: --Vital Signs Last 24 Hrs  T(C): 36.7 (05-16-24 @ 19:48), Max: 36.7 (05-16-24 @ 19:48)  T(F): 98.1 (05-16-24 @ 19:48), Max: 98.1 (05-16-24 @ 19:48)  HR: --  BP: --  BP(mean): --  RR: 18 (05-16-24 @ 20:53) (17 - 18)  SpO2: --    Orthostatic VS  05-16-24 @ 19:48  Lying BP: --/-- HR: --  Sitting BP: 122/85 HR: 91  Standing BP: 121/71 HR: 117  Site: --  Mode: --  Orthostatic VS  05-16-24 @ 08:36  Lying BP: --/-- HR: --  Sitting BP: 103/60 HR: 85  Standing BP: 102/61 HR: 97  Site: --  Mode: --  Orthostatic VS  05-15-24 @ 19:48  Lying BP: --/-- HR: --  Sitting BP: 112/69 HR: 82  Standing BP: 116/72 HR: 85  Site: --  Mode: --  Orthostatic VS  05-15-24 @ 08:30  Lying BP: --/-- HR: --  Sitting BP: 109/74 HR: 80  Standing BP: 98/71 HR: 93  Site: --  Mode: --    Lipid Panel:

## 2024-05-16 NOTE — BH INPATIENT PSYCHIATRY PROGRESS NOTE - CURRENT MEDICATION
MEDICATIONS  (STANDING):  ARIPiprazole 7 milliGRAM(s) Oral daily  haloperidol     Tablet 20 milliGRAM(s) Oral at bedtime    MEDICATIONS  (PRN):  haloperidol     Tablet 2 milliGRAM(s) Oral every 6 hours PRN agitation  haloperidol    Injectable 2 milliGRAM(s) IntraMuscular once PRN agitation  LORazepam     Tablet 2 milliGRAM(s) Oral every 6 hours PRN agitation  LORazepam   Injectable 2 milliGRAM(s) IntraMuscular once PRN Agitation   MEDICATIONS  (STANDING):  ARIPiprazole 7 milliGRAM(s) Oral daily  benztropine 1 milliGRAM(s) Oral two times a day  haloperidol     Tablet 15 milliGRAM(s) Oral at bedtime    MEDICATIONS  (PRN):  haloperidol     Tablet 2 milliGRAM(s) Oral every 6 hours PRN agitation  haloperidol    Injectable 2 milliGRAM(s) IntraMuscular once PRN agitation  LORazepam     Tablet 2 milliGRAM(s) Oral every 6 hours PRN agitation  LORazepam   Injectable 2 milliGRAM(s) IntraMuscular once PRN Agitation

## 2024-05-17 PROCEDURE — 99232 SBSQ HOSP IP/OBS MODERATE 35: CPT

## 2024-05-17 RX ORDER — HALOPERIDOL DECANOATE 100 MG/ML
100 INJECTION INTRAMUSCULAR ONCE
Refills: 0 | Status: COMPLETED | OUTPATIENT
Start: 2024-05-21 | End: 2024-05-21

## 2024-05-17 RX ADMIN — ARIPIPRAZOLE 7 MILLIGRAM(S): 15 TABLET ORAL at 08:52

## 2024-05-17 RX ADMIN — HALOPERIDOL DECANOATE 15 MILLIGRAM(S): 100 INJECTION INTRAMUSCULAR at 20:29

## 2024-05-17 RX ADMIN — Medication 1 MILLIGRAM(S): at 08:52

## 2024-05-17 RX ADMIN — Medication 1 MILLIGRAM(S): at 20:29

## 2024-05-17 NOTE — BH INPATIENT PSYCHIATRY PROGRESS NOTE - NSBHASSESSSUMMFT_PSY_ALL_CORE
30 yo woman, on SSD, PPHx ASD w/ schizophrenia, 8 previous admissions , multiple ED presentations, no hx of suicide attempts, history of aggression in the past (hitting), PMH of obesity, pre-diabetes who in treatment at [facility], last seen on 12/26/23, bib today by her mom for worsening of +ah . Endorses CAH at baseline, however reports worsening CAH to hurt others lately, denies intent, help seeking and future oriented for treatment, with plans to transition to Haldol [facility]HAO.     Plan:  -decrease haldol from 20mg to 15mg PO (taper by 25% each week), administer 100mg haldol dec 5/16, final 100mg 5/21 (total 200mg)  -c/w abilify 7mg qd

## 2024-05-17 NOTE — BH INPATIENT PSYCHIATRY PROGRESS NOTE - ATTENDING COMMENTS
Chart was reviewed and case discussed with the Psych NP. I agree with the assessment and plan as documented in the Psych NP's progress note and was directly involved in medical decision making.    Chart was reviewed and case discussed with the Psych NP. I agree with the assessment and plan as documented in the Psych NP's progress note and was directly involved in medical decision making.

## 2024-05-17 NOTE — BH INPATIENT PSYCHIATRY PROGRESS NOTE - NSBHCHARTREVIEWVS_PSY_A_CORE FT
Vital Signs Last 24 Hrs  T(C): 36.7 (05-17-24 @ 08:23), Max: 36.7 (05-16-24 @ 19:48)  T(F): 98 (05-17-24 @ 08:23), Max: 98.1 (05-16-24 @ 19:48)  HR: --  BP: --  BP(mean): --  RR: 16 (05-17-24 @ 08:23) (16 - 18)  SpO2: --    Orthostatic VS  05-17-24 @ 08:23  Lying BP: --/-- HR: --  Sitting BP: 120/65 HR: 80  Standing BP: 114/77 HR: 99  Site: --  Mode: electronic  Orthostatic VS  05-16-24 @ 19:48  Lying BP: --/-- HR: --  Sitting BP: 122/85 HR: 91  Standing BP: 121/71 HR: 117  Site: --  Mode: --  Orthostatic VS  05-16-24 @ 08:36  Lying BP: --/-- HR: --  Sitting BP: 103/60 HR: 85  Standing BP: 102/61 HR: 97  Site: --  Mode: --  Orthostatic VS  05-15-24 @ 19:48  Lying BP: --/-- HR: --  Sitting BP: 112/69 HR: 82  Standing BP: 116/72 HR: 85  Site: --  Mode: --

## 2024-05-17 NOTE — BH INPATIENT PSYCHIATRY PROGRESS NOTE - CURRENT MEDICATION
MEDICATIONS  (STANDING):  ARIPiprazole 7 milliGRAM(s) Oral daily  benztropine 1 milliGRAM(s) Oral two times a day  haloperidol     Tablet 15 milliGRAM(s) Oral at bedtime    MEDICATIONS  (PRN):  haloperidol     Tablet 2 milliGRAM(s) Oral every 6 hours PRN agitation  haloperidol    Injectable 2 milliGRAM(s) IntraMuscular once PRN agitation  LORazepam     Tablet 2 milliGRAM(s) Oral every 6 hours PRN agitation  LORazepam   Injectable 2 milliGRAM(s) IntraMuscular once PRN Agitation

## 2024-05-17 NOTE — BH INPATIENT PSYCHIATRY PROGRESS NOTE - NSBHMETABOLIC_PSY_ALL_CORE_FT
BMI: BMI (kg/m2): 37.2 (05-11-24 @ 17:33)  HbA1c:   Glucose: POCT Blood Glucose.: 98 mg/dL (05-09-24 @ 02:12)    BP: --Vital Signs Last 24 Hrs  T(C): 36.7 (05-17-24 @ 08:23), Max: 36.7 (05-16-24 @ 19:48)  T(F): 98 (05-17-24 @ 08:23), Max: 98.1 (05-16-24 @ 19:48)  HR: --  BP: --  BP(mean): --  RR: 16 (05-17-24 @ 08:23) (16 - 18)  SpO2: --    Orthostatic VS  05-17-24 @ 08:23  Lying BP: --/-- HR: --  Sitting BP: 120/65 HR: 80  Standing BP: 114/77 HR: 99  Site: --  Mode: electronic  Orthostatic VS  05-16-24 @ 19:48  Lying BP: --/-- HR: --  Sitting BP: 122/85 HR: 91  Standing BP: 121/71 HR: 117  Site: --  Mode: --  Orthostatic VS  05-16-24 @ 08:36  Lying BP: --/-- HR: --  Sitting BP: 103/60 HR: 85  Standing BP: 102/61 HR: 97  Site: --  Mode: --  Orthostatic VS  05-15-24 @ 19:48  Lying BP: --/-- HR: --  Sitting BP: 112/69 HR: 82  Standing BP: 116/72 HR: 85  Site: --  Mode: --    Lipid Panel:

## 2024-05-18 RX ADMIN — ARIPIPRAZOLE 7 MILLIGRAM(S): 15 TABLET ORAL at 08:23

## 2024-05-18 RX ADMIN — HALOPERIDOL DECANOATE 15 MILLIGRAM(S): 100 INJECTION INTRAMUSCULAR at 20:37

## 2024-05-18 RX ADMIN — Medication 1 MILLIGRAM(S): at 08:23

## 2024-05-18 RX ADMIN — Medication 1 MILLIGRAM(S): at 20:37

## 2024-05-19 RX ADMIN — Medication 1 MILLIGRAM(S): at 20:13

## 2024-05-19 RX ADMIN — Medication 1 MILLIGRAM(S): at 08:27

## 2024-05-19 RX ADMIN — HALOPERIDOL DECANOATE 15 MILLIGRAM(S): 100 INJECTION INTRAMUSCULAR at 20:14

## 2024-05-19 RX ADMIN — ARIPIPRAZOLE 7 MILLIGRAM(S): 15 TABLET ORAL at 08:27

## 2024-05-20 PROCEDURE — 99232 SBSQ HOSP IP/OBS MODERATE 35: CPT

## 2024-05-20 PROCEDURE — 90853 GROUP PSYCHOTHERAPY: CPT

## 2024-05-20 RX ORDER — BENZTROPINE MESYLATE 1 MG
1 TABLET ORAL DAILY
Refills: 0 | Status: DISCONTINUED | OUTPATIENT
Start: 2024-05-21 | End: 2024-05-22

## 2024-05-20 RX ADMIN — Medication 1 MILLIGRAM(S): at 08:19

## 2024-05-20 RX ADMIN — ARIPIPRAZOLE 7 MILLIGRAM(S): 15 TABLET ORAL at 08:18

## 2024-05-20 RX ADMIN — HALOPERIDOL DECANOATE 15 MILLIGRAM(S): 100 INJECTION INTRAMUSCULAR at 20:19

## 2024-05-20 NOTE — BH PSYCHOLOGY - CLINICIAN PSYCHOTHERAPY NOTE - NSBHPSYCHOLGOALS_PSY_A_CORE
Improve social/vocational/coping skills

## 2024-05-20 NOTE — BH INPATIENT PSYCHIATRY DISCHARGE NOTE - NSBHFUPINTERVALHXFT_PSY_A_CORE
Pt has continued to show improvement in symptoms. Pt states that psychosis is much improved, denying anxiety. On day of discharge, pt denies SI/HI/AVH and behavior has been well controlled. Pt reports that sleep and appetite have returned to baseline. Pt has been fully engaged in treatment on the unit, compliant with medications, and is in agreement to continue care in the outpatient setting. Pt is supported by family, who are protective factors. Pt is able to safety plan appropriately. Pt's risk cannot be further reduced with continued inpatient hospitalization. Pt is no longer an acute danger to self or others, and is stable for discharge home.   Discharge Progress Note Date and Time: 05-22-24 @ 08:54    Pt has continued to show improvement in symptoms. Pt states that psychosis is much improved, denying anxiety. On day of discharge, pt denies SI/HI/AVH and behavior has been well controlled. Pt reports that sleep and appetite have returned to baseline. Pt has been fully engaged in treatment on the unit, compliant with medications, and is in agreement to continue care in the outpatient setting. Pt is supported by family, who are protective factors. Pt is able to safety plan appropriately. Pt's risk cannot be further reduced with continued inpatient hospitalization. Pt is no longer an acute danger to self or others, and is stable for discharge home.

## 2024-05-20 NOTE — BH INPATIENT PSYCHIATRY DISCHARGE NOTE - NSDCCPCAREPLAN_GEN_ALL_CORE_FT
PRINCIPAL DISCHARGE DIAGNOSIS  Diagnosis: Schizophrenia, acute undifferentiated  Assessment and Plan of Treatment:

## 2024-05-20 NOTE — BH INPATIENT PSYCHIATRY PROGRESS NOTE - NSBHCHARTREVIEWVS_PSY_A_CORE FT
Vital Signs Last 24 Hrs  T(C): 36.6 (05-20-24 @ 08:20), Max: 36.6 (05-20-24 @ 08:20)  T(F): 97.8 (05-20-24 @ 08:20), Max: 97.8 (05-20-24 @ 08:20)  HR: --  BP: --  BP(mean): --  RR: --  SpO2: --    Orthostatic VS  05-20-24 @ 08:20  Lying BP: --/-- HR: --  Sitting BP: 93/58 HR: 91  Standing BP: --/-- HR: --  Site: --  Mode: --  Orthostatic VS  05-19-24 @ 19:35  Lying BP: --/-- HR: --  Sitting BP: 117/78 HR: 82  Standing BP: 99/75 HR: 117  Site: --  Mode: --  Orthostatic VS  05-19-24 @ 08:05  Lying BP: --/-- HR: --  Sitting BP: 130/66 HR: 80  Standing BP: 123/78 HR: 112  Site: --  Mode: --  Orthostatic VS  05-18-24 @ 19:36  Lying BP: --/-- HR: --  Sitting BP: 112/79 HR: 89  Standing BP: 127/68 HR: 84  Site: --  Mode: --   Vital Signs Last 24 Hrs  T(C): 36.7 (05-21-24 @ 19:48), Max: 36.7 (05-21-24 @ 19:48)  T(F): 98.1 (05-21-24 @ 19:48), Max: 98.1 (05-21-24 @ 19:48)  HR: --  BP: --  BP(mean): --  RR: 18 (05-21-24 @ 20:18) (18 - 18)  SpO2: --    Orthostatic VS  05-21-24 @ 19:48  Lying BP: --/-- HR: --  Sitting BP: 117/62 HR: 84  Standing BP: 115/63 HR: 99  Site: --  Mode: --  Orthostatic VS  05-21-24 @ 08:44  Lying BP: --/-- HR: --  Sitting BP: 110/63 HR: 82  Standing BP: 108/66 HR: 99  Site: upper left arm  Mode: electronic  Orthostatic VS  05-20-24 @ 19:30  Lying BP: --/-- HR: --  Sitting BP: 116/68 HR: 88  Standing BP: 100/73 HR: 118  Site: --  Mode: --  Orthostatic VS  05-20-24 @ 08:20  Lying BP: --/-- HR: --  Sitting BP: 93/58 HR: 91  Standing BP: --/-- HR: --  Site: --  Mode: --

## 2024-05-20 NOTE — BH INPATIENT PSYCHIATRY DISCHARGE NOTE - ATTENDING DISCHARGE PHYSICAL EXAMINATION:
Chart was reviewed and case discussed with the Psych NP. I agree with the assessment and plan as documented in the Psych NP's progress note and was directly involved in medical decision making.  Pt states that psychosis is much improved, denying anxiety. On day of discharge, pt denies SI/HI and behavior has been well controlled.

## 2024-05-20 NOTE — BH INPATIENT PSYCHIATRY DISCHARGE NOTE - NSBHASSESSSUMMFT_PSY_ALL_CORE
On day of discharge, pt encouraged to continue medication regiment. Pt educated on importance of monitoring for worsening symptoms, and to call 911 or go to the nearest emergency department if not feeling safe in the community. Pt provided with numbers to Suicide Prevention and [facility] and educated on their use. Pt voiced understanding to education. Pt was provided with one month supply of medication and a follow up appointment with outpatient psychiatric services. Pt reported feeling safe for discharge and to continue treatment with [facility] and [facility].     Pt is at a chronic risk to self injury due to, but not limited by: serious mental illness, hx of aggression, hx of multiple hospitalizations.  Protective factors include: supportive family, consistent outpatient treatment, [facility]HAO, no known hx of SA.

## 2024-05-20 NOTE — BH INPATIENT PSYCHIATRY DISCHARGE NOTE - NSBHMETABOLIC_PSY_ALL_CORE_FT
BMI: BMI (kg/m2): 37.2 (05-11-24 @ 17:33)  HbA1c:   Glucose: POCT Blood Glucose.: 98 mg/dL (05-09-24 @ 02:12)    BP: --Vital Signs Last 24 Hrs  T(C): 36.6 (05-20-24 @ 08:20), Max: 36.6 (05-20-24 @ 08:20)  T(F): 97.8 (05-20-24 @ 08:20), Max: 97.8 (05-20-24 @ 08:20)  HR: --  BP: --  BP(mean): --  RR: --  SpO2: --    Orthostatic VS  05-20-24 @ 08:20  Lying BP: --/-- HR: --  Sitting BP: 93/58 HR: 91  Standing BP: --/-- HR: --  Site: --  Mode: --  Orthostatic VS  05-19-24 @ 19:35  Lying BP: --/-- HR: --  Sitting BP: 117/78 HR: 82  Standing BP: 99/75 HR: 117  Site: --  Mode: --  Orthostatic VS  05-19-24 @ 08:05  Lying BP: --/-- HR: --  Sitting BP: 130/66 HR: 80  Standing BP: 123/78 HR: 112  Site: --  Mode: --  Orthostatic VS  05-18-24 @ 19:36  Lying BP: --/-- HR: --  Sitting BP: 112/79 HR: 89  Standing BP: 127/68 HR: 84  Site: --  Mode: --    Lipid Panel:

## 2024-05-20 NOTE — BH PSYCHOLOGY - GROUP THERAPY NOTE - NSPSYCHOLGRPCOGGOAL_PSY_A_CORE FT
Decrease symptoms, Develop coping/emotion regulation skills, Psychoeducation  
Decrease symptoms, Develop coping/emotion regulation skills, Psychoeducation

## 2024-05-20 NOTE — BH INPATIENT PSYCHIATRY DISCHARGE NOTE - EXTENDED VTE YES NO FOR MLM ENOXAPARIN
, Complex Repair And Melolabial Flap Text: The defect edges were debeveled with a #15 scalpel blade.  The primary defect was closed partially with a complex linear closure.  Given the location of the remaining defect, shape of the defect and the proximity to free margins a melolabial flap was deemed most appropriate for complete closure of the defect.  Using a sterile surgical marker, an appropriate advancement flap was drawn incorporating the defect and placing the expected incisions within the relaxed skin tension lines where possible.    The area thus outlined was incised deep to adipose tissue with a #15 scalpel blade.  The skin margins were undermined to an appropriate distance in all directions utilizing iris scissors.

## 2024-05-20 NOTE — BH INPATIENT PSYCHIATRY PROGRESS NOTE - NSBHMETABOLIC_PSY_ALL_CORE_FT
BMI: BMI (kg/m2): 37.2 (05-11-24 @ 17:33)  HbA1c:   Glucose: POCT Blood Glucose.: 98 mg/dL (05-09-24 @ 02:12)    BP: --Vital Signs Last 24 Hrs  T(C): 36.6 (05-20-24 @ 08:20), Max: 36.6 (05-20-24 @ 08:20)  T(F): 97.8 (05-20-24 @ 08:20), Max: 97.8 (05-20-24 @ 08:20)  HR: --  BP: --  BP(mean): --  RR: --  SpO2: --    Orthostatic VS  05-20-24 @ 08:20  Lying BP: --/-- HR: --  Sitting BP: 93/58 HR: 91  Standing BP: --/-- HR: --  Site: --  Mode: --  Orthostatic VS  05-19-24 @ 19:35  Lying BP: --/-- HR: --  Sitting BP: 117/78 HR: 82  Standing BP: 99/75 HR: 117  Site: --  Mode: --  Orthostatic VS  05-19-24 @ 08:05  Lying BP: --/-- HR: --  Sitting BP: 130/66 HR: 80  Standing BP: 123/78 HR: 112  Site: --  Mode: --  Orthostatic VS  05-18-24 @ 19:36  Lying BP: --/-- HR: --  Sitting BP: 112/79 HR: 89  Standing BP: 127/68 HR: 84  Site: --  Mode: --    Lipid Panel:  BMI: BMI (kg/m2): 37.2 (05-11-24 @ 17:33)  HbA1c:   Glucose: POCT Blood Glucose.: 98 mg/dL (05-09-24 @ 02:12)    BP: --Vital Signs Last 24 Hrs  T(C): 36.7 (05-21-24 @ 19:48), Max: 36.7 (05-21-24 @ 19:48)  T(F): 98.1 (05-21-24 @ 19:48), Max: 98.1 (05-21-24 @ 19:48)  HR: --  BP: --  BP(mean): --  RR: 18 (05-21-24 @ 20:18) (18 - 18)  SpO2: --    Orthostatic VS  05-21-24 @ 19:48  Lying BP: --/-- HR: --  Sitting BP: 117/62 HR: 84  Standing BP: 115/63 HR: 99  Site: --  Mode: --  Orthostatic VS  05-21-24 @ 08:44  Lying BP: --/-- HR: --  Sitting BP: 110/63 HR: 82  Standing BP: 108/66 HR: 99  Site: upper left arm  Mode: electronic  Orthostatic VS  05-20-24 @ 19:30  Lying BP: --/-- HR: --  Sitting BP: 116/68 HR: 88  Standing BP: 100/73 HR: 118  Site: --  Mode: --  Orthostatic VS  05-20-24 @ 08:20  Lying BP: --/-- HR: --  Sitting BP: 93/58 HR: 91  Standing BP: --/-- HR: --  Site: --  Mode: --    Lipid Panel:

## 2024-05-20 NOTE — BH INPATIENT PSYCHIATRY DISCHARGE NOTE - NSBHPSYCHMEDSDETAILS_PSY_ALL_CORE
[facility] [place]  [address] [place], [address] ~ [phone]                Infant Custody Release   7/10/2019    Girl [address]           Admission Information     Date & Time  7/10/2019 Provider  Coleman Figueroa MD Depart Other justification...

## 2024-05-20 NOTE — BH INPATIENT PSYCHIATRY DISCHARGE NOTE - HPI (INCLUDE ILLNESS QUALITY, SEVERITY, DURATION, TIMING, CONTEXT, MODIFYING FACTORS, ASSOCIATED SIGNS AND SYMPTOMS)
30 yo woman, on SSD, PPHx ASD w/ schizophrenia, 8 previous admissions , multiple ED presentations, no hx of suicide attempts, history of aggression in the past (hitting), PMH of obesity, pre-diabetes who in treatment at [facility], last seen on 12/26/23, bib today by her mom for worsening of +ah .     As per psych ED:   as per the last em note on 12/26/23: "Pt's psychotic symptoms are at baseline. Encouraged pt to continue to engage in [facility] program. Continue abilify 5 mg daily in addition to haldol 20 mg QHS. Pt is doing better psychiatrically and is more engaged in her [facility] program. RTC 4-6 weeks- pt prefers zoom"    On today's assessment pt , reports she is hearing more voices and the voices are louder than usual, "telling to hurt others, anyone". She is also reporting the  voices are negative and laugh at her "they tell me to be like them". Pt denies she wants to act on the voices , denies any si/hi . She states she has ah and vh at baseline but finds that she is seeing more people with pale skin and black clothing and red eyes " they are weighing down on me, making my knees and my stomach hurt" , with somatic preoccupations . Patient feels paranoid that people want to hurt her. Patient states she finds herself feeling depressed because of it asking to be in the hospital , does not think her medications are working as before. She is endorsing poor appetite, low energy level, amotivation and not sleeping well. No manic sx. Patient reports taking medications     As per the collateral, mom reports  pt was complaining of stomach pain today , but also feels it is tied up to the mental health issues , was   reporting hearing more voices and mom notices that pt having conversation with herself more than usual. Patient has ah and vh at baseline . Patient is not eating well , today she tried to have her eat at least one meal. Patient is not sleeping at baseline . No verbalization of si/hi.   She is in  [facility] program attends 2x/week  , pt currently to Haldol 20mg qhs , Abilify 5mg qdaily . Pt was previously on the [facility]HAO Haldol and was doing well , currently not on it and mom feels she would benefit. No medical issues. No allergies. Pt has paternal aun : undiagnosed psychiatric hx.    Upon evaluation on [facility], pt is calm, cooperative , in good behavioral control, eating lunch in no acute distress. Pt reports similar narrative as in the psych ED. Endorses worsening CAH to hurt others and are hypercritical and negativistic. Pt reports that they have been worsening at home without a clear trigger. Endorses that voices have already been improving since being on the unit, stating that when she is distracted and talking to others the voices become more attenuated. Pt endorses chronic VH at baseline of pale skinned people wearing black clothings with red eyes, denies current VH on the unit. Pt denies acute depressed mood, feeling brighter since time in ED. Pt denies SI/HI, future oriented, motivated to optimize psychiatric tx, reports doing best when she was on Haldol ZHAO and is very amenable to be restarted on this during hospital stay. Reports sleep has been erratic at home with good appetite. denies recent substances.

## 2024-05-20 NOTE — BH INPATIENT PSYCHIATRY PROGRESS NOTE - NSBHASSESSSUMMFT_PSY_ALL_CORE
28 yo woman, on SSD, PPHx ASD w/ schizophrenia, 8 previous admissions , multiple ED presentations, no hx of suicide attempts, history of aggression in the past (hitting), PMH of obesity, pre-diabetes who in treatment at [facility], last seen on 12/26/23, bib today by her mom for worsening of +ah . Endorses CAH at baseline, however reports worsening CAH to hurt others lately, denies intent, help seeking and future oriented for treatment, with plans to transition to Haldol ZHAO.     Plan:  -decrease haldol from 20mg to 15mg PO (taper by 25% each week), administer 100mg haldol dec 5/16, final 100mg 5/21 (total 200mg)  -c/w abilify 7mg qd

## 2024-05-20 NOTE — BH INPATIENT PSYCHIATRY DISCHARGE NOTE - NSBHDCRISKMITIGATE_PSY_ALL_CORE
Safety planning/Referral to PROS/Family/Other social support involvement/Long acting injectable medication/Assisted outpatient treatment

## 2024-05-20 NOTE — BH PSYCHOLOGY - GROUP THERAPY NOTE - NSPSYCHOLGRPCOGPT_PSY_A_CORE FT
Patient attended Cognitive Behavioral Therapy Group utilizing concepts and skills from Acceptance and Commitment Therapy (ACT). The group started with a brief check in where patients were asked to share their favorite way to socialize. The group then focused on the topic of taking care of your mind by taking care of your body. Patients shared how they take care of themselves. Patients also spoke about what they find difficult to do. The  and co leader explained concepts, reinforced participation, and engaged patients in the discussion. 
Patient attended Cognitive Behavioral Therapy Group utilizing concepts and skills from Acceptance and Commitment Therapy (ACT). The group started with a brief check in where patients were asked to share something they value. The group then focused on the topic of opposites that can both be true. Patients shared how they cope with difficult situations. Patients also spoke about what they enjoy. The  explained concepts, reinforced participation, and engaged patients in the discussion.

## 2024-05-20 NOTE — BH PSYCHOLOGY - GROUP THERAPY NOTE - NSBHPSYCHOLRESPCOMMENT_PSY_A_CORE FT
Patient discussed how she enjoys socializing and talking with others
Patient discussed how she values family and friends

## 2024-05-20 NOTE — BH PSYCHOLOGY - GROUP THERAPY NOTE - TOKEN PULL-DIAGNOSIS
Primary Diagnosis:  Paranoid schizophrenia [F20.0]        Problem Dx:   Schizophrenia, acute undifferentiated [F20.3]      
Primary Diagnosis:  Paranoid schizophrenia [F20.0]        Problem Dx:   Schizophrenia, acute undifferentiated [F20.3]

## 2024-05-20 NOTE — BH PSYCHOLOGY - CLINICIAN PSYCHOTHERAPY NOTE - TOKEN PULL-DIAGNOSIS
Primary Diagnosis:  Paranoid schizophrenia [F20.0]        Problem Dx:   Schizophrenia, acute undifferentiated [F20.3]      

## 2024-05-20 NOTE — BH INPATIENT PSYCHIATRY DISCHARGE NOTE - NSDCMRMEDTOKEN_GEN_ALL_CORE_FT
Actamin 325 mg oral tablet: 2 tab(s) orally every 6 hours, As needed, Mild Pain (1 - 3), Moderate Pain (4 - 6)  Advil Liquigels Minis 200 mg oral capsule: 2 cap(s) orally 3 times a day   Alavert 10 mg oral tablet: 1 tab(s) orally once a day   Anusol-HC 25 mg rectal suppository: 1 suppository(ies) rectally 2 times a day   Carafate 1 g oral tablet: 1 tab(s) orally every 6 hours As needed for acidity  cefuroxime 500 mg oral tablet: 1 tab(s) orally 2 times a day  famotidine 20 mg oral tablet: 1  orally once a day   Haldol 10 mg oral tablet: 1 tab(s) orally once a day (at bedtime)  Haldol 5 mg oral tablet: 1 tab(s) orally once a day   mg oral tablet: 1 tab(s) orally every 6 hours   Keflex 500 mg oral capsule: 1 cap(s) orally 3 times a day   ondansetron 4 mg oral tablet, disintegratin tab(s) orally every 8 hours, As Needed -for nausea   Pepcid 20 mg oral tablet: 1 tab(s) orally 2 times a day  rolling walker:   Tessalon Perles 100 mg oral capsule: 1 cap(s) orally 1 to 3 times a day, As Needed   Abilify 2 mg oral tablet: 1 tab(s) orally once a day take one tab of 2mg with one tab of 5mg MDD: 7mg  ARIPiprazole 5 mg oral tablet: 1 tab(s) orally once a day take one tab of 5mg with one tab of 2mg MDD: 7mg  haloperidol 5 mg oral tablet: 2 tab(s) orally once a day (at bedtime) take 10mg from 5/22 to 5/28, then continue with 5mg  haloperidol 5 mg oral tablet: 1 tab(s) orally once a day (at bedtime) take 5mg from 5/29 to 6/4, then stop  haloperidol decanoate 100 mg/mL intramuscular solution: 200 milligram(s) intramuscularly every 4 weeks

## 2024-05-20 NOTE — BH PSYCHOLOGY - CLINICIAN PSYCHOTHERAPY NOTE - NSTXPSYCHOGOAL_PSY_ALL_CORE
Will verbalize 1 strategy to successfully cope with psychotic symptoms
Will verbalize 1 strategy to successfully cope with psychotic symptoms
Will be able to report experiencing hallucinations to staff

## 2024-05-20 NOTE — BH INPATIENT PSYCHIATRY DISCHARGE NOTE - NSBHDCHANDOFFFT_PSY_ALL_CORE
email sent to Dr. Koch at [facility] with hospitalization and medication list email sent to [facility] with hospitalization and medication list

## 2024-05-20 NOTE — BH PSYCHOLOGY - CLINICIAN PSYCHOTHERAPY NOTE - NSBHPSYCHOLNARRATIVE_PSY_A_CORE FT
Patient appeared appropriately attired. Patient reported mood as "fantastic". Sleep was reported to have been good. Appetite was noted to be good. Patient denied  current Auditory or Visual Hallucinations. Patient endorsed previous Auditory and Visual Hallucinations but reported that she does not experience them when talking to people and socializing. Pt did not endorse homicidal or suicidal ideation/plan/intent. Patient agreed to inform staff should the patient experience suicidal or homicidal ideation/plant/intent. Patient did not endorse self-injurious behaviors. Gait was normal. No psychomotor retardation or psychomotor acceleration was observed during the interview. Patient's attitude was good and cooperative. Eye contact was good. Speech was WNL. Tone was normal. Rate of speech was normal. Affect was congruent and appropriate. Thought process (observed) was linear and coherent at times but tangential and flight of ideas at others. Sensorium was noted to be awake and alert. Concentration was fair. Attention span was normal. Impulse control was good. Insight was fair. Judgment was fair.    The parameters of treatment and the limits of confidentiality were discussed. During the session the patient discussed what brought her to the hospital. Patient reported that her auditory and visual hallucinations have gotten worse recently. Patient described what the hallucinations were like stating that she sees angels who tell her good things and are nice, but she also hears and sees negative ones and described them as "very pale with red eyes dressed in all black with black nail polish". Patient discussed how normally when she is "socializing" and around other people then she does not see or hear them, however recently she was seeing and hearing them more often. Patient reported that since coming to the hospital it has been better. Patient discussed what her day to day looked like outside of the hospital. Patient discussed many of the goals she has for when she leaves the hospital. Patient discussed her support system which are her mother and grandparents. Writer offered support   
Patient appeared appropriately attired. Patient reported mood as "great now". Sleep was reported to have been good. Appetite was noted to be good. Patient denied  current Auditory or Visual Hallucinations. Patient endorsed previous Auditory and Visual Hallucinations but reported that she does not experience them when talking to people and socializing. Pt did not endorse homicidal or suicidal ideation/plan/intent. Patient agreed to inform staff should the patient experience suicidal or homicidal ideation/plant/intent. Patient did not endorse self-injurious behaviors. Gait was normal. No psychomotor retardation or psychomotor acceleration was observed during the interview. Patient's attitude was good and cooperative. Eye contact was good. Speech was WNL. Tone was normal. Rate of speech was normal. Affect was congruent and appropriate. Thought process (observed) was linear and coherent at times but tangential and flight of ideas at others. Sensorium was noted to be awake and alert. Concentration was fair. Attention span was normal. Impulse control was good. Insight was poor. Judgment was fair.    During the session the patient discussed how she felt about leaving the hospital. Patient explained how she would miss the hospital but was looking forward to going home. Patient discussed how she was struggling with wanting to be independent but also have friends. Writer and patient discussed how both can be true. Writer and patient discussed setting boundaries. Patient expressed how she was excited to return to her outpatient treatment. Writer offered support 
Patient appeared appropriately attired. Patient reported mood as "great now". Sleep was reported to have been good. Appetite was noted to be good. Patient denied  current Auditory or Visual Hallucinations. Patient endorsed previous Auditory and Visual Hallucinations but reported that she does not experience them when talking to people and socializing. Pt did not endorse homicidal or suicidal ideation/plan/intent. Patient agreed to inform staff should the patient experience suicidal or homicidal ideation/plant/intent. Patient did not endorse self-injurious behaviors. Gait was normal. No psychomotor retardation or psychomotor acceleration was observed during the interview. Patient's attitude was good and cooperative. Eye contact was good. Speech was WNL. Tone was normal. Rate of speech was normal. Affect was congruent and appropriate. Thought process (observed) was linear and coherent at times but tangential and flight of ideas at others. Sensorium was noted to be awake and alert. Concentration was fair. Attention span was normal. Impulse control was good. Insight was poor. Judgment was fair.    During the session the patient discussed what her goals were. Patient stated many times that she wanted more friends and more "socialization". Patient described the different activities she wants to do and organizations she wants to join in order to make "a trillion friends". Patient explained how she wants the "right friends" and all different friends, when asked to elaborate patient stated "the right friends are all different colors like yellow, orange, green, blue, and just all the colors of the rainbow". Patient went on to describe how the different colors are different personality types, and how it is important to have friends "in all different colors". Patient discussed how she experiences less hallucinations when around others, and how she feels bored when not with others, therefore she wants to be around others more often. Patient discussed how she would also like to join a gym and "sports complex" to make friends and get in better shape. Patient made a list of all the places she plans to go and join once discharged. Patient discussed how this morning she was feeling home sick but was doing better now. Patient and writer discussed what strategies helped the patient with those difficult thoughts and feelings.

## 2024-05-20 NOTE — BH INPATIENT PSYCHIATRY PROGRESS NOTE - CURRENT MEDICATION
MEDICATIONS  (STANDING):  ARIPiprazole 7 milliGRAM(s) Oral daily  haloperidol     Tablet 15 milliGRAM(s) Oral at bedtime    MEDICATIONS  (PRN):  haloperidol     Tablet 2 milliGRAM(s) Oral every 6 hours PRN agitation  haloperidol    Injectable 2 milliGRAM(s) IntraMuscular once PRN agitation  LORazepam     Tablet 2 milliGRAM(s) Oral every 6 hours PRN agitation  LORazepam   Injectable 2 milliGRAM(s) IntraMuscular once PRN Agitation   MEDICATIONS  (STANDING):  ARIPiprazole 7 milliGRAM(s) Oral daily  benztropine 1 milliGRAM(s) Oral daily  haloperidol     Tablet 15 milliGRAM(s) Oral at bedtime    MEDICATIONS  (PRN):  haloperidol     Tablet 2 milliGRAM(s) Oral every 6 hours PRN agitation  haloperidol    Injectable 2 milliGRAM(s) IntraMuscular once PRN agitation  LORazepam     Tablet 2 milliGRAM(s) Oral every 6 hours PRN agitation  LORazepam   Injectable 2 milliGRAM(s) IntraMuscular once PRN Agitation

## 2024-05-20 NOTE — BH INPATIENT PSYCHIATRY DISCHARGE NOTE - HOSPITAL COURSE
During hospitalization, pt visible on the unit, socializing with others intermittently, attending groups. Pt occasionally reported +CAH to hurt others, though at other times endorsed seeing "angels." No behavioral issues noted while on the unit. Pt in agreement to low titration of abilify, up to 7mg to help with mood and irritability. Pt in agreement to restart haldol dec. Pt was converted from 20mg PO to 200mg ZHAO, with first 100mg given on 5/16 and remainder given 5/21. PO haldol was decreased by 25%, with pt taking 15mg for one week and discharged on 10mg, with education provided to continue tapering off. No side effects were noted or reported with medication increase and administration of dec. By discharge, pt's affect was brighter, seen smiling and able to superficially engage appropriately in conversation.     Treatment Plan  - haldol dec 200mg: loading dose of 100mg given 5/16 with second dose 5/21  - continue tapering off haldol PO by 25% each week (continue 10mg for one week, then decrease to 5mg, then stop)  - continue abilify 7mg During hospitalization, pt visible on the unit, socializing with others intermittently, attending groups. Pt occasionally reported +CAH to hurt others, though at other times endorsed seeing "angels." No behavioral issues noted while on the unit. Pt in agreement to low titration of abilify, up to 7mg to help with mood and irritability and +AH. Pt in agreement to restart haldol dec. Pt was converted from 20mg PO to 200mg ZHAO, with first 100mg given on 5/16 and remainder given 5/21. PO haldol was decreased by 25%, with pt taking 15mg for one week and discharged on 10mg and 5mg, with education provided to continue tapering off. No side effects were noted or reported with medication increase and administration of dec. By discharge, pt's affect was brighter, seen smiling and able to superficially engage appropriately in conversation. Pt reported improvement to +AH, denying +CAH. By time of discharge, pt stated that she heard "angels" telling her "good things" and rarely heard "demons" though these voices were easy to ignore with socialization.     Treatment Plan  - haldol dec 200mg: loading dose of 100mg given 5/16 with second dose 5/21  - continue tapering off haldol PO by 25% each week (continue 10mg for one week, then decrease to 5mg, then stop)  - continue abilify 7mg

## 2024-05-20 NOTE — BH PSYCHOLOGY - CLINICIAN PSYCHOTHERAPY NOTE - NSBHPSYCHOLINT_PSY_A_CORE FT
Acceptance and Commitment Therapy  

## 2024-05-21 PROCEDURE — 99232 SBSQ HOSP IP/OBS MODERATE 35: CPT

## 2024-05-21 RX ORDER — HALOPERIDOL DECANOATE 100 MG/ML
200 INJECTION INTRAMUSCULAR
Qty: 0 | Refills: 0 | DISCHARGE

## 2024-05-21 RX ORDER — HALOPERIDOL DECANOATE 100 MG/ML
1 INJECTION INTRAMUSCULAR
Qty: 7 | Refills: 0
Start: 2024-05-21 | End: 2024-05-27

## 2024-05-21 RX ORDER — HALOPERIDOL DECANOATE 100 MG/ML
1 INJECTION INTRAMUSCULAR
Qty: 0 | Refills: 0 | DISCHARGE

## 2024-05-21 RX ORDER — ARIPIPRAZOLE 15 MG/1
1 TABLET ORAL
Qty: 30 | Refills: 0
Start: 2024-05-21 | End: 2024-06-19

## 2024-05-21 RX ORDER — HALOPERIDOL DECANOATE 100 MG/ML
2 INJECTION INTRAMUSCULAR
Qty: 14 | Refills: 0
Start: 2024-05-21 | End: 2024-05-27

## 2024-05-21 RX ADMIN — HALOPERIDOL DECANOATE 15 MILLIGRAM(S): 100 INJECTION INTRAMUSCULAR at 20:16

## 2024-05-21 RX ADMIN — ARIPIPRAZOLE 7 MILLIGRAM(S): 15 TABLET ORAL at 08:17

## 2024-05-21 RX ADMIN — Medication 1 MILLIGRAM(S): at 08:17

## 2024-05-21 RX ADMIN — HALOPERIDOL DECANOATE 100 MILLIGRAM(S): 100 INJECTION INTRAMUSCULAR at 10:08

## 2024-05-21 NOTE — BH DISCHARGE NOTE NURSING/SOCIAL WORK/PSYCH REHAB - NSDCPRGOAL_PSY_ALL_CORE
Initially, pt endorsed AH, disorganized. Pt made some progress towards her discharge. Pt has identified her coping skills such as talk to others, meditation, making friends, and exercise to manage symptom. Pt currently denies SI, HI, and VH. Pt endorsed AH and stated she hears rachael’s voices and voice is positive no longer negative. Pt has demonstrated daily compliant with medication regimen and dosage. Pt has verbally agreed to comply with medication/treatment post-discharge.  During this hospitalization, pt showed approximately 80% of group attendance. During the group session, pt was able to tolerate group structure, participated relevantly. Pt was visible on the unit, interacts well with others. Pt maintained fair ADLs. Pt has participated in her safety plan.

## 2024-05-21 NOTE — BH INPATIENT PSYCHIATRY PROGRESS NOTE - NSBHMSETHTPROC_PSY_A_CORE
Linear
Linear/Illogical/Other

## 2024-05-21 NOTE — BH INPATIENT PSYCHIATRY PROGRESS NOTE - NSBHMSEBODY_PSY_A_CORE
Overweight
Obese
Gynecomastia, male  04/03/2017    Active  Harlan Cohen
Overweight

## 2024-05-21 NOTE — BH INPATIENT PSYCHIATRY PROGRESS NOTE - NSDCCRITERIA_PSY_ALL_CORE
CGI < 2, improved AH and mood 

## 2024-05-21 NOTE — BH INPATIENT PSYCHIATRY PROGRESS NOTE - NSTXDCOTHRGOAL_PSY_ALL_CORE
Pt has chronic mental illness, which include baseline AH / VH

## 2024-05-21 NOTE — BH DISCHARGE NOTE NURSING/SOCIAL WORK/PSYCH REHAB - PATIENT PORTAL LINK FT
You can access the FollowMyHealth Patient Portal offered by [facility] by registering at the following website: [email or web address]. By joining [employer]’s FollowMyHealth portal, you will also be able to view your health information using other applications (apps) compatible with our system.

## 2024-05-21 NOTE — BH INPATIENT PSYCHIATRY PROGRESS NOTE - NSBHMETABOLIC_PSY_ALL_CORE_FT
BMI: BMI (kg/m2): 37.2 (05-11-24 @ 17:33)  HbA1c:   Glucose: POCT Blood Glucose.: 98 mg/dL (05-09-24 @ 02:12)    BP: --Vital Signs Last 24 Hrs  T(C): 36.5 (05-21-24 @ 08:44), Max: 36.8 (05-20-24 @ 19:30)  T(F): 97.7 (05-21-24 @ 08:44), Max: 98.3 (05-20-24 @ 19:30)  HR: --  BP: --  BP(mean): --  RR: 18 (05-21-24 @ 08:44) (18 - 18)  SpO2: --    Orthostatic VS  05-21-24 @ 08:44  Lying BP: --/-- HR: --  Sitting BP: 110/63 HR: 82  Standing BP: 108/66 HR: 99  Site: upper left arm  Mode: electronic  Orthostatic VS  05-20-24 @ 19:30  Lying BP: --/-- HR: --  Sitting BP: 116/68 HR: 88  Standing BP: 100/73 HR: 118  Site: --  Mode: --  Orthostatic VS  05-20-24 @ 08:20  Lying BP: --/-- HR: --  Sitting BP: 93/58 HR: 91  Standing BP: --/-- HR: --  Site: --  Mode: --  Orthostatic VS  05-19-24 @ 19:35  Lying BP: --/-- HR: --  Sitting BP: 117/78 HR: 82  Standing BP: 99/75 HR: 117  Site: --  Mode: --    Lipid Panel:  BMI: BMI (kg/m2): 37.2 (05-11-24 @ 17:33)  HbA1c:   Glucose: POCT Blood Glucose.: 98 mg/dL (05-09-24 @ 02:12)    BP: --Vital Signs Last 24 Hrs  T(C): 36.7 (05-21-24 @ 19:48), Max: 36.7 (05-21-24 @ 19:48)  T(F): 98.1 (05-21-24 @ 19:48), Max: 98.1 (05-21-24 @ 19:48)  HR: --  BP: --  BP(mean): --  RR: 18 (05-21-24 @ 08:44) (18 - 18)  SpO2: --    Orthostatic VS  05-21-24 @ 19:48  Lying BP: --/-- HR: --  Sitting BP: 117/62 HR: 84  Standing BP: 115/63 HR: 99  Site: --  Mode: --  Orthostatic VS  05-21-24 @ 08:44  Lying BP: --/-- HR: --  Sitting BP: 110/63 HR: 82  Standing BP: 108/66 HR: 99  Site: upper left arm  Mode: electronic  Orthostatic VS  05-20-24 @ 19:30  Lying BP: --/-- HR: --  Sitting BP: 116/68 HR: 88  Standing BP: 100/73 HR: 118  Site: --  Mode: --  Orthostatic VS  05-20-24 @ 08:20  Lying BP: --/-- HR: --  Sitting BP: 93/58 HR: 91  Standing BP: --/-- HR: --  Site: --  Mode: --    Lipid Panel:

## 2024-05-21 NOTE — BH INPATIENT PSYCHIATRY PROGRESS NOTE - NSBHPSYCHOLCOGORIENT_PSY_A_CORE
Oriented to time, place, person, situation
No
Oriented to time, place, person, situation

## 2024-05-21 NOTE — BH INPATIENT PSYCHIATRY PROGRESS NOTE - NSBHATTESTBILLING_PSY_A_CORE
22783-Cikjaecvvt OBS or IP - moderate complexity OR 35-49 mins
23818-Jragelihve OBS or IP - moderate complexity OR 35-49 mins
12958-Qkavxducbb OBS or IP - moderate complexity OR 35-49 mins
96629-Kycowcmkel OBS or IP - moderate complexity OR 35-49 mins
45274-Tzxtbnsnqw OBS or IP - moderate complexity OR 35-49 mins
73904-Vxlxevfqsc OBS or IP - moderate complexity OR 35-49 mins
06295-Ywihbjycjf OBS or IP - moderate complexity OR 35-49 mins
65472-Vmiqeilxfg OBS or IP - moderate complexity OR 35-49 mins
97048-Awosxwjfop OBS or IP - moderate complexity OR 35-49 mins
43859-Bfujqrbdfb OBS or IP - moderate complexity OR 35-49 mins

## 2024-05-21 NOTE — BH INPATIENT PSYCHIATRY PROGRESS NOTE - NSTXDCOTHRDATETRGT_PSY_ALL_CORE
17-May-2024

## 2024-05-21 NOTE — BH INPATIENT PSYCHIATRY PROGRESS NOTE - NSBHCONSBHPROVDETAILS_PSY_A_CORE  FT
left message for Rosendo Young, [employer]  ext [phone]
left message for Rosendo Young, [employer]  ext [phone]
left message for Rosendo Young, [employer]  ext [phone]
left message for Rosendo Young, [employer]  ext [phone]
left message for Rosendo Young, [employer]  ext [phone]
left message for Rosendo Young, [employer]  ext [phone]
left message for Rosendo Young, [employer]  ext [phone]
left message for Rosendo Young, [employer]  ext [phone]
left message for Rosendo Young, [employer]  ext [phone]
left message for Rosendo Young, [employer]  ext [phone]

## 2024-05-21 NOTE — BH INPATIENT PSYCHIATRY PROGRESS NOTE - NSBHFUPINTERVALCCFT_PSY_A_CORE
CAH/SI
CAH/SI
reported feeling "good". 
CAH/SI
CAH/SI
reported feeling "good". 
reported feeling "good". 
CAH/SI

## 2024-05-21 NOTE — BH INPATIENT PSYCHIATRY PROGRESS NOTE - PRN MEDS
MEDICATIONS  (PRN):  haloperidol     Tablet 2 milliGRAM(s) Oral every 6 hours PRN agitation  haloperidol    Injectable 2 milliGRAM(s) IntraMuscular once PRN agitation  LORazepam     Tablet 2 milliGRAM(s) Oral every 6 hours PRN agitation  LORazepam   Injectable 2 milliGRAM(s) IntraMuscular once PRN Agitation  

## 2024-05-21 NOTE — BH DISCHARGE NOTE NURSING/SOCIAL WORK/PSYCH REHAB - NSBHDCAGENCY1FT_PSY_A_CORE
[facility] ([facility]) Adult outpatient department - in person appointment with Dr. Koch.

## 2024-05-21 NOTE — BH INPATIENT PSYCHIATRY PROGRESS NOTE - NSBHMSEPERCEPT_PSY_A_CORE
Auditory hallucinations/Visual hallucinations
Auditory hallucinations
Auditory hallucinations/Visual hallucinations
Auditory hallucinations/Other
Auditory hallucinations/Visual hallucinations
Auditory hallucinations/Other
Auditory hallucinations/Visual hallucinations
Auditory hallucinations/Other

## 2024-05-21 NOTE — BH DISCHARGE NOTE NURSING/SOCIAL WORK/PSYCH REHAB - NSCDUDCCRISIS_PSY_A_CORE
[facility] Well  1 (517) [facility]-WELL ([phone])  Text "WELL" to 80792  Website: www.[employer]/.[facility] 1 (396) 621-HOPE (3678) Website: www.safehorizon.org/.  [facility] - [facility] – Crisis Care for Children, Adults and Families  [address] [place] [address]  Mobile Crisis Hotline – (283) 272-5548/.National Suicide Prevention Lifeline 7 (068) 868-4357/.  [facility]  1 (864) [employer] (968-9795)/.  [facility]  (764) 345-5340/.  [facility] Behavioral Health Helpline / [facility] Mobile Crisis  (037) 173-ZRKR (1362)/.  [facility] Response Crisis Hotline  (966) 888-5320  24 hour telephone crisis intervention and suicide prevention hotline concerned with all mental health issues/.  [facility]  [address] [place] [address] [phone]   Hours:  Monday through Friday from 9 AM to 3 PM/988 Suicide and Crisis Lifeline

## 2024-05-21 NOTE — BH INPATIENT PSYCHIATRY PROGRESS NOTE - NSTXPSYCHODATETRGT_PSY_ALL_CORE
22-May-2024
24-May-2024
24-May-2024
22-May-2024
15-May-2024
17-May-2024
24-May-2024

## 2024-05-21 NOTE — BH INPATIENT PSYCHIATRY PROGRESS NOTE - CURRENT MEDICATION
MEDICATIONS  (STANDING):  ARIPiprazole 7 milliGRAM(s) Oral daily  benztropine 1 milliGRAM(s) Oral daily  haloperidol     Tablet 15 milliGRAM(s) Oral at bedtime    MEDICATIONS  (PRN):  haloperidol     Tablet 2 milliGRAM(s) Oral every 6 hours PRN agitation  haloperidol    Injectable 2 milliGRAM(s) IntraMuscular once PRN agitation  LORazepam     Tablet 2 milliGRAM(s) Oral every 6 hours PRN agitation  LORazepam   Injectable 2 milliGRAM(s) IntraMuscular once PRN Agitation

## 2024-05-21 NOTE — BH INPATIENT PSYCHIATRY PROGRESS NOTE - NSBHASSESSSUMMFT_PSY_ALL_CORE
30 yo woman, on SSD, PPHx ASD w/ schizophrenia, 8 previous admissions , multiple ED presentations, no hx of suicide attempts, history of aggression in the past (hitting), PMH of obesity, pre-diabetes who in treatment at [facility], last seen on 12/26/23, bib today by her mom for worsening of +ah . Endorses CAH at baseline, however reports worsening CAH to hurt others lately, denies intent, help seeking and future oriented for treatment, with plans to transition to Haldol ZHAO.     Plan:  -decrease haldol from 20mg to 15mg PO (taper by 25% each week), administer 100mg haldol dec 5/16, final 100mg 5/21 (total 200mg)  -c/w abilify 7mg qd    30 yo woman, on SSD, PPHx ASD w/ schizophrenia, 8 previous admissions , multiple ED presentations, no hx of suicide attempts, history of aggression in the past (hitting), PMH of obesity, pre-diabetes who in treatment at [facility], last seen on 12/26/23, bib today by her mom for worsening of +ah . Endorses CAH at baseline, however reports worsening CAH to hurt others lately, denies intent, help seeking and future oriented for treatment, with plans to transition to Haldol ZHAO.     Plan:  -decrease haldol from 20mg to 15mg PO (taper by 25% each week), administer 100mg haldol dec 5/16, final 100mg 5/21 (total 200mg)  -c/w abilify 7mg qd

## 2024-05-21 NOTE — BH INPATIENT PSYCHIATRY PROGRESS NOTE - NSBHCHARTREVIEWVS_PSY_A_CORE FT
Vital Signs Last 24 Hrs  T(C): 36.5 (05-21-24 @ 08:44), Max: 36.8 (05-20-24 @ 19:30)  T(F): 97.7 (05-21-24 @ 08:44), Max: 98.3 (05-20-24 @ 19:30)  HR: --  BP: --  BP(mean): --  RR: 18 (05-21-24 @ 08:44) (18 - 18)  SpO2: --    Orthostatic VS  05-21-24 @ 08:44  Lying BP: --/-- HR: --  Sitting BP: 110/63 HR: 82  Standing BP: 108/66 HR: 99  Site: upper left arm  Mode: electronic  Orthostatic VS  05-20-24 @ 19:30  Lying BP: --/-- HR: --  Sitting BP: 116/68 HR: 88  Standing BP: 100/73 HR: 118  Site: --  Mode: --  Orthostatic VS  05-20-24 @ 08:20  Lying BP: --/-- HR: --  Sitting BP: 93/58 HR: 91  Standing BP: --/-- HR: --  Site: --  Mode: --  Orthostatic VS  05-19-24 @ 19:35  Lying BP: --/-- HR: --  Sitting BP: 117/78 HR: 82  Standing BP: 99/75 HR: 117  Site: --  Mode: --   Vital Signs Last 24 Hrs  T(C): 36.7 (05-21-24 @ 19:48), Max: 36.7 (05-21-24 @ 19:48)  T(F): 98.1 (05-21-24 @ 19:48), Max: 98.1 (05-21-24 @ 19:48)  HR: --  BP: --  BP(mean): --  RR: 18 (05-21-24 @ 08:44) (18 - 18)  SpO2: --    Orthostatic VS  05-21-24 @ 19:48  Lying BP: --/-- HR: --  Sitting BP: 117/62 HR: 84  Standing BP: 115/63 HR: 99  Site: --  Mode: --  Orthostatic VS  05-21-24 @ 08:44  Lying BP: --/-- HR: --  Sitting BP: 110/63 HR: 82  Standing BP: 108/66 HR: 99  Site: upper left arm  Mode: electronic  Orthostatic VS  05-20-24 @ 19:30  Lying BP: --/-- HR: --  Sitting BP: 116/68 HR: 88  Standing BP: 100/73 HR: 118  Site: --  Mode: --  Orthostatic VS  05-20-24 @ 08:20  Lying BP: --/-- HR: --  Sitting BP: 93/58 HR: 91  Standing BP: --/-- HR: --  Site: --  Mode: --

## 2024-05-21 NOTE — BH INPATIENT PSYCHIATRY PROGRESS NOTE - NSTXPSYCHOGOAL_PSY_ALL_CORE
Will be able to report experiencing hallucinations to staff
Will verbalize 1 strategy to successfully cope with psychotic symptoms
Will be able to report experiencing hallucinations to staff
Will verbalize 1 strategy to successfully cope with psychotic symptoms
Will be able to report experiencing hallucinations to staff

## 2024-05-21 NOTE — BH INPATIENT PSYCHIATRY PROGRESS NOTE - NSBHMSETHTCONTENT_PSY_A_CORE
Unremarkable
Delusions

## 2024-05-21 NOTE — BH INPATIENT PSYCHIATRY PROGRESS NOTE - NSTXPSYCHODATEEST_PSY_ALL_CORE
10-May-2024
09-May-2024
10-May-2024
15-May-2024
09-May-2024
10-May-2024
10-May-2024
09-May-2024
15-May-2024
09-May-2024

## 2024-05-21 NOTE — BH DISCHARGE NOTE NURSING/SOCIAL WORK/PSYCH REHAB - DISCHARGE INSTRUCTIONS AFTERCARE APPOINTMENTS
In order to check the location, date, or time of your aftercare appointment, please refer to your Discharge Instructions Document given to you upon leaving the hospital.  If you have lost the instructions please call [phone]

## 2024-05-21 NOTE — BH INPATIENT PSYCHIATRY PROGRESS NOTE - ATTENDING COMMENTS
Chart was reviewed and case discussed with the Psych NP. I agree with the assessment and plan as documented in the Psych NP's progress note and was directly involved in medical decision making.

## 2024-05-21 NOTE — BH DISCHARGE NOTE NURSING/SOCIAL WORK/PSYCH REHAB - NSDCPRRECOMMEND_PSY_ALL_CORE
Please follow up treatment with TSI PROS on 5/23/2024 at 9:30 AM. You also have an appointment with KAZJEANIE on 5/24/2024 at 2 PM.

## 2024-05-21 NOTE — BH INPATIENT PSYCHIATRY PROGRESS NOTE - NSICDXBHPRIMARYDX_PSY_ALL_CORE
Paranoid schizophrenia   F20.0  

## 2024-05-21 NOTE — BH INPATIENT PSYCHIATRY PROGRESS NOTE - NSBHFUPINTERVALHXFT_PSY_A_CORE
Chart and history reviewed and discussed with treatment team. No events reported overnight. Pt seen attending groups. Pt pleasant upon approach, able to interact appropriately. Reports some +AH of "angels" which have been telling her "good things" about her future. Informed of haldol dec injection tomorrow, with pt in agreement. Reviewed PO titration schedule with pt, voicing understanding to education. No behavioral issues noted. Medication compliant, tolerating well, free of EPS. Sleep and appetite maintained. Denies SI/HI/AVH. Continue to monitor for safety. 
f/up paranoid schizophrenia, care discussed w/ tx team, VSS. no issues overnight. Patient reported she felt like she was carrying a "heavy load" on admission. Reported that she was hearing voices that were taunting her and experiencing VH, currently not endorsing both. Reported that the voices tell her to hurt others and denied commands to hurt self. no plan or intent to act on the commands. Patient reported feeling anxious. reported one previous suicide gesture of picking up a knife at age 18. denied past SA. Patient reported was illogical when attempting to explain outpatient treatment. Patient reported fair sleep and appetite. taking meds. no td, eps or tremors observed/ reported. reports taking meds, assisted by mother. 
Patient is seen for psychosis worsening of +CAH to harm others.  Chart, medications and labs reviewed (no acute findings in labs). Case discussed with nursing, no interval events. Patient remains compliant with medications, no SE reported.  No behavioral concerns, no prns for aggression.  Eating and sleeping well.     Patient is observed unit. She presents internally preoccupied.  She is cooperative,  engaging, and amenable to interview.   Patient reports feeling “good” denies SI/SIB/HI, no plan or intent and engages in safety planning on the unit.      Patient endorses CAH, voices telling her to harm others, also reports that voices are hypercritical and negativistic.  Denies any urges to want to harm self or others.  Patient reports voices has slightly attenuated today, able to distract the voices. Pt endorses chronic VH at baseline of pale skinned people wearing black clothing with red eyes, denies current VH on the unit. No talha, or delusions.  No acute medical issues. VSS:98, 106/71, 80.  Continue to monitor and provide therapeutic support.     
Chart and history reviewed and discussed with treatment team. No events reported overnight. Seen with SW and MS. Received halol dec 100mg ZHAO this mornign with good effect, no side effects reported. Pt seen attending groups. Pt pleasant upon approach, able to interact appropriately. PT continues to endorse chronic +AH of "angels" though today states that she occasionally hears "demons" but is easily able to ignore them when she is socializing with others and engaged. Pt states that voices are more manageable than previously, with coping skills reviewed. Pt states that she feels safe for discharge home tomorrow. Reviewed PO titration schedule with pt, voicing understanding to education. No behavioral issues noted. Medication compliant, tolerating well, free of EPS. Sleep and appetite maintained. Denies SI/HI. Continue to monitor for safety. 
Chart and history reviewed and discussed with treatment team. No events reported overnight. Seen with SW. Pt seen resting in room after attending group. Discussed upcoming discharge, with pt stating that she feels safe and ready. Discussed conversion of haldol PO to dec, with pt in agreement. Will hold off on converting abilify PO to ZHAO due to current dosage. Plan to administer first dose of 100mg today and decrease PO to 15mg. Pt presents with bright affect, able to openly discuss short and long term goals for stabilization. No behavioral issues noted. Medication compliant, tolerating well, free of EPS. Sleep and appetite maintained. Denies SI/HI/AVH. Continue to monitor for safety. 
Chart and history reviewed and discussed with treatment team. No events reported overnight. Pt seen attending groups. Pt pleasant upon approach, able to interact appropriately. Reports some +AH of "angels" which have been telling her "good things." Discussed haldol dec transition, with pt denying any side effects. Pt informed of change in discharge date due to need to titrate down haldol PO, with pt voicing understanding and agreement. Pt reports previous haldol dec dosage of 100-150mg, in agreement to 200mg total. Remaining 100mg ordered for Tuesday with plan to decrease haldol PO by another 25% for discharge Wednesday. No behavioral issues noted. Medication compliant, tolerating well, free of EPS. Sleep and appetite maintained. Denies SI/HI/AVH. Continue to monitor for safety. 
Chart and history reviewed and discussed with treatment team. No events reported overnight. Seen with SW. Pt seen resting in room. Pt reports improvement to +AH with medication, stating that they are easier to ignore and no longer command in nature. Pt has been seen attending group, stating that she enjoys socializing to help reduce +AH. Pt discussed long term goals of going back to school and making friends. Discussed transition to ZHAO with pt in agreement. No behavioral issues noted. Medication compliant, tolerating well, free of EPS. Sleep and appetite maintained. Denies SI/HI/AVH. Continue to monitor for safety. 
Patient is seen for psychosis worsening of +CAH to harm others.  Chart, medications and labs reviewed (no acute findings in labs). Case discussed with nursing, no interval events. Patient remains compliant with medications, no SE reported.  No behavioral concerns, no prns for aggression.  Eating and sleeping well.     Patient is observed in dayroom eating breakfast, social with peers. She is cooperative,  engaging, and amenable to interview.   Patient reports feeling “good, I took a shower” denies SI/SIB/HI, no plan or intent and engages in safety planning on the unit.      Patient endorses CAH, voices telling her to harm others, also reports that voices are hypercritical and negativistic.  Denies any urges to want to harm self or others.  Patient reports voices continues to decrease in intensity.   Reports she is able to distract the voices. denies current VH, talha, or delusions.  No acute medical issues. Denies pain or discomfort.  Continue to monitor and provide therapeutic support.   
Patient is seen for psychosis worsening of +CAH to harm others.  Chart, medications and labs reviewed (low sodium on admit, repeat sodium today) Case discussed with nursing, no interval events. Patient remains compliant with medications, no SE reported.  No behavioral concerns, no prns for aggression.  Eating and sleeping well.   Patient is observed in her room. She is cooperative, bright affect, engaging, and amenable to interview.   Patient reports feeling “good” denies SI/SIB/HI, no plan or intent and engages in safety planning on the unit.  Reports she has been attending groups and socializing with peers. Patient reports she feels she is getting better and is setting goals for herself upon dc to get a job, go to college, to make friends, and go to outpatient. states she attends [employer] for outpatient, and wants to attend "Mindfulness meditation" classes  Patient denies CAH "I don't hear today".  Denies any urges to want to harm self or others.  Denies VH, talha, or delusions.  No acute medical issues. Denies pain or discomfort.  Continue to monitor and provide therapeutic support. VSS: 97.7, 102/74, 81  
Patient is seen for psychosis worsening of +CAH to harm others/self.  Chart, medications and labs reviewed (low sodium on admit, repeat sodium on 5/13 resulted 141) Case discussed with nursing, no interval events. Patient remains compliant with medications, no SE reported.  No behavioral concerns, no prns for aggression.  Eating and sleeping well. Nursing reports pt received po prn last night haldol 5mg and ativan 2mg   Patient is observed in her room. She is cooperative, bright affect, engaging, and amenable to interview.   Patient reports feeling “good” offers no complaints. She denies SI/SIB/HI, no plan or intent and engages in safety planning on the unit.  Patient reports she feels she is getting better and is setting goals for herself upon dc to get a job, go to college, to make friends, and go to outpatient. states she attends [employer] for outpatient, and wants to attend "Mindfulness meditation" classes.  Patient inquired about dc "I'm feeling better and I feel homesick" Pt states she feels stable enough to be released home.  Patient denies CAH/VH, talha, or delusions.  No acute medical issues. Denies pain or discomfort.  Continue to monitor and provide therapeutic support. VSS:

## 2024-05-21 NOTE — BH INPATIENT PSYCHIATRY PROGRESS NOTE - NSBHATTESTTYPEVISIT_PSY_A_CORE
On-site Attending supervising HEATHER (99XXX codes)
HEATHER without on-site Attending supervision
On-site Attending supervising HEATHER (99XXX codes)
HEATHER without on-site Attending supervision
On-site Attending supervising HEATHER (99XXX codes)
On-site Attending supervising HEATHER (99XXX codes)

## 2024-05-22 VITALS — TEMPERATURE: 98 F | RESPIRATION RATE: 16 BRPM

## 2024-05-22 RX ADMIN — ARIPIPRAZOLE 7 MILLIGRAM(S): 15 TABLET ORAL at 08:34

## 2024-05-22 RX ADMIN — Medication 1 MILLIGRAM(S): at 08:35

## 2024-06-17 NOTE — ED PROVIDER NOTE - TOBACCO USE
Please check 3 home BPs in 1 month and send for call me  
Referral, last office note, and face sheet faxed to [facility] ([facility]) on 05/17/24.    Fax transmission confirmation received.  
Unknown if ever smoked

## 2024-07-16 NOTE — BH INPATIENT PSYCHIATRY PROGRESS NOTE - NSTXDCOTHRDATEEST_PSY_ALL_CORE
10-May-2024
Post-Care Instructions: I reviewed with the patient in detail post-care instructions. Patient is to wear sunprotection, and avoid picking at any of the treated lesions. Pt may apply Vaseline to crusted or scabbing areas.
Duration Of Freeze Thaw-Cycle (Seconds): 0
Render Post-Care Instructions In Note?: no
Consent: The patient's consent was obtained including but not limited to risks of crusting, scabbing, blistering, scarring, darker or lighter pigmentary change, recurrence, incomplete removal and infection.
Show Aperture Variable?: Yes
Detail Level: Detailed
10-May-2024

## 2024-09-30 PROCEDURE — 99213 OFFICE O/P EST LOW 20 MIN: CPT

## 2024-12-12 NOTE — BH PSYCHOLOGY - GROUP THERAPY NOTE - NSPSYCHOLGRPCOGGOAL_PSY_A_CORE
Name of Medication(s) Requested:  Requested Prescriptions      No prescriptions requested or ordered in this encounter     Amolodipine  Medication is on current medication list Yes    Dosage and directions were verified? Yes    Quantity verified: 30 day supply     Pharmacy Verified?  Yes    Last Appointment:  4/30/2024    Future appts:  Future Appointments   Date Time Provider Department Center   1/13/2025 10:40 AM Viviane Beckett MD [place] [facility]   2/20/2025  2:30 PM Angus Mg MD [facility]        (If no appt send self scheduling link. .REFILLAPPT)  Scheduling request sent?     [] Yes  [x] No    Does patient need updated?  [] Yes  [x] No   
other...
other...

## 2025-01-25 ENCOUNTER — EMERGENCY (EMERGENCY)
Facility: HOSPITAL | Age: 30
LOS: 1 days | Discharge: HOME | End: 2025-01-25
Attending: EMERGENCY MEDICINE | Admitting: EMERGENCY MEDICINE
Payer: MEDICAID

## 2025-01-25 VITALS
SYSTOLIC BLOOD PRESSURE: 111 MMHG | HEART RATE: 86 BPM | RESPIRATION RATE: 16 BRPM | TEMPERATURE: 98 F | DIASTOLIC BLOOD PRESSURE: 72 MMHG | OXYGEN SATURATION: 99 %

## 2025-01-25 VITALS
DIASTOLIC BLOOD PRESSURE: 77 MMHG | SYSTOLIC BLOOD PRESSURE: 121 MMHG | OXYGEN SATURATION: 100 % | HEART RATE: 81 BPM | TEMPERATURE: 98 F | RESPIRATION RATE: 17 BRPM

## 2025-01-25 LAB
ALBUMIN SERPL ELPH-MCNC: 4 G/DL — SIGNIFICANT CHANGE UP (ref 3.3–5)
ALP SERPL-CCNC: 90 U/L — SIGNIFICANT CHANGE UP (ref 40–120)
ALT FLD-CCNC: 15 U/L — SIGNIFICANT CHANGE UP (ref 4–33)
ANION GAP SERPL CALC-SCNC: 18 MMOL/L — HIGH (ref 7–14)
APPEARANCE UR: CLEAR — SIGNIFICANT CHANGE UP
AST SERPL-CCNC: 28 U/L — SIGNIFICANT CHANGE UP (ref 4–32)
BASOPHILS # BLD AUTO: 0.02 K/UL — SIGNIFICANT CHANGE UP (ref 0–0.2)
BASOPHILS NFR BLD AUTO: 0.3 % — SIGNIFICANT CHANGE UP (ref 0–2)
BILIRUB SERPL-MCNC: 0.2 MG/DL — SIGNIFICANT CHANGE UP (ref 0.2–1.2)
BILIRUB UR-MCNC: NEGATIVE — SIGNIFICANT CHANGE UP
BUN SERPL-MCNC: 8 MG/DL — SIGNIFICANT CHANGE UP (ref 7–23)
CALCIUM SERPL-MCNC: 9.3 MG/DL — SIGNIFICANT CHANGE UP (ref 8.4–10.5)
CHLORIDE SERPL-SCNC: 101 MMOL/L — SIGNIFICANT CHANGE UP (ref 98–107)
CO2 SERPL-SCNC: 15 MMOL/L — LOW (ref 22–31)
COLOR SPEC: YELLOW — SIGNIFICANT CHANGE UP
CREAT SERPL-MCNC: 0.59 MG/DL — SIGNIFICANT CHANGE UP (ref 0.5–1.3)
DIFF PNL FLD: NEGATIVE — SIGNIFICANT CHANGE UP
EGFR: 123 ML/MIN/1.73M2 — SIGNIFICANT CHANGE UP
EOSINOPHIL # BLD AUTO: 0.19 K/UL — SIGNIFICANT CHANGE UP (ref 0–0.5)
EOSINOPHIL NFR BLD AUTO: 3.2 % — SIGNIFICANT CHANGE UP (ref 0–6)
GLUCOSE SERPL-MCNC: 111 MG/DL — HIGH (ref 70–99)
GLUCOSE UR QL: NEGATIVE MG/DL — SIGNIFICANT CHANGE UP
HCT VFR BLD CALC: 34.3 % — LOW (ref 34.5–45)
HGB BLD-MCNC: 10.8 G/DL — LOW (ref 11.5–15.5)
IANC: 4.91 K/UL — SIGNIFICANT CHANGE UP (ref 1.8–7.4)
IMM GRANULOCYTES NFR BLD AUTO: 0.5 % — SIGNIFICANT CHANGE UP (ref 0–0.9)
KETONES UR-MCNC: NEGATIVE MG/DL — SIGNIFICANT CHANGE UP
LEUKOCYTE ESTERASE UR-ACNC: NEGATIVE — SIGNIFICANT CHANGE UP
LYMPHOCYTES # BLD AUTO: 0.72 K/UL — LOW (ref 1–3.3)
LYMPHOCYTES # BLD AUTO: 12 % — LOW (ref 13–44)
MAGNESIUM SERPL-MCNC: 2.1 MG/DL — SIGNIFICANT CHANGE UP (ref 1.6–2.6)
MCHC RBC-ENTMCNC: 27.8 PG — SIGNIFICANT CHANGE UP (ref 27–34)
MCHC RBC-ENTMCNC: 31.5 G/DL — LOW (ref 32–36)
MCV RBC AUTO: 88.2 FL — SIGNIFICANT CHANGE UP (ref 80–100)
MONOCYTES # BLD AUTO: 0.11 K/UL — SIGNIFICANT CHANGE UP (ref 0–0.9)
MONOCYTES NFR BLD AUTO: 1.8 % — LOW (ref 2–14)
NEUTROPHILS # BLD AUTO: 4.91 K/UL — SIGNIFICANT CHANGE UP (ref 1.8–7.4)
NEUTROPHILS NFR BLD AUTO: 82.2 % — HIGH (ref 43–77)
NITRITE UR-MCNC: NEGATIVE — SIGNIFICANT CHANGE UP
NRBC # BLD: 0 /100 WBCS — SIGNIFICANT CHANGE UP (ref 0–0)
NRBC # FLD: 0 K/UL — SIGNIFICANT CHANGE UP (ref 0–0)
PH UR: 8.5 (ref 5–8)
PHOSPHATE SERPL-MCNC: 1.9 MG/DL — LOW (ref 2.5–4.5)
PLATELET # BLD AUTO: 217 K/UL — SIGNIFICANT CHANGE UP (ref 150–400)
POTASSIUM SERPL-MCNC: 5.2 MMOL/L — SIGNIFICANT CHANGE UP (ref 3.5–5.3)
POTASSIUM SERPL-SCNC: 5.2 MMOL/L — SIGNIFICANT CHANGE UP (ref 3.5–5.3)
PROT SERPL-MCNC: 8.4 G/DL — HIGH (ref 6–8.3)
PROT UR-MCNC: SIGNIFICANT CHANGE UP MG/DL
RBC # BLD: 3.89 M/UL — SIGNIFICANT CHANGE UP (ref 3.8–5.2)
RBC # FLD: 16.6 % — HIGH (ref 10.3–14.5)
SODIUM SERPL-SCNC: 134 MMOL/L — LOW (ref 135–145)
SP GR SPEC: 1.08 — HIGH (ref 1–1.03)
TSH SERPL-MCNC: 0.54 UIU/ML — SIGNIFICANT CHANGE UP (ref 0.27–4.2)
UROBILINOGEN FLD QL: 0.2 MG/DL — SIGNIFICANT CHANGE UP (ref 0.2–1)
WBC # BLD: 5.98 K/UL — SIGNIFICANT CHANGE UP (ref 3.8–10.5)
WBC # FLD AUTO: 5.98 K/UL — SIGNIFICANT CHANGE UP (ref 3.8–10.5)

## 2025-01-25 PROCEDURE — 74177 CT ABD & PELVIS W/CONTRAST: CPT | Mod: 26

## 2025-01-25 PROCEDURE — 99285 EMERGENCY DEPT VISIT HI MDM: CPT

## 2025-01-25 RX ORDER — SOD PHOS DI, MONO/K PHOS MONO 250 MG
1 TABLET ORAL ONCE
Refills: 0 | Status: COMPLETED | OUTPATIENT
Start: 2025-01-25 | End: 2025-01-25

## 2025-01-25 RX ADMIN — Medication 1 PACKET(S): at 09:04

## 2025-01-25 NOTE — ED PROVIDER NOTE - PROGRESS NOTE DETAILS
THEO Mercado- Pt feeling better after ER stay, abd pain improved, tolerating PO. Pt give PO Phos low hypophosphatemia, will dc with strict return precautions.

## 2025-01-25 NOTE — ED PROVIDER NOTE - PATIENT PORTAL LINK FT
You can access the FollowMyHealth Patient Portal offered by [facility] by registering at the following website: [email or web address]. By joining [facility]’s FollowMyHealth portal, you will also be able to view your health information using other applications (apps) compatible with our system. You can access the FollowMyHealth Patient Portal offered by [facility] by registering at the following website: [email or web address]. By joining [facility]’s FollowMyHealth portal, you will also be able to view your health information using other applications (apps) compatible with our system.

## 2025-01-25 NOTE — ED ADULT NURSE REASSESSMENT NOTE - NS ED NURSE REASSESS COMMENT FT1
Patient passed PO challenge. No drooling, coughing or trouble swallowing noted or reported. Medicated as per Md orders. Patient attached to cardiac monitor and is NSR on the cardiac monitor.

## 2025-01-25 NOTE — ED PROVIDER NOTE - ATTENDING CONTRIBUTION TO CARE
Agree with resident note  30-year-old female with history of schizophrenia presents with abdominal pain since last night.  States had mac & cheese and then felt bloated with nausea and vomiting subsequently.  Denies any blood or bile in emesis.  Denies diarrhea, fever, sick contacts.  Patient is here with her mother.  Denies any HI or SI.  Does state has auditory hallucinations.  Physical exam  Well-appearing female in no respiratory distress  Vital signs stable  Clear to auscultation bilateral  S1-S2 no murmurs rubs or gallops  Abdomen right lower quadrant tenderness no rebound no guarding  Extremities no edema  Impression  Given location of pain in abdomen we will proceed with labs and CT, likely gastroenteritis but given location of pain warrants a CT  Patient is not a threat to herself or others, confirmed by mother

## 2025-01-25 NOTE — ED ADULT TRIAGE NOTE - CHIEF COMPLAINT QUOTE
Pt c/o right sided abd pain with N/V since last night. Pt also reporting auditory hallucinations. Stating that the voices are saying "I hope you are sick". Pt calm and cooperative, stating she is concerned that voices are making her sick. Mother with pt confirms she did vomit multiple times. Hx: Schizophrenia. Pt c/o right sided abd pain with N/V since last night. Pt also reporting auditory hallucinations. Stating that the voices are saying "I hope you are sick". Pt calm and cooperative, stating she is concerned that voices are making her sick. Mother with pt confirms she did vomit multiple times. Hx: Schizophrenia. MD Leiva called for  reyna, due to N/V pt ot be seen in the main.

## 2025-01-25 NOTE — ED ADULT NURSE REASSESSMENT NOTE - NS ED NURSE REASSESS COMMENT FT1
Patient is A+Ox4 and resting comfortably. Mother is at the bedside. Respirations are even and unlabored. Patient denies HI, SI, and currently denies visual or auditory hallucinations. Cardiac rate and rhythm is 2+ and regular. Abdomen is round, soft and nontender to palpation. Patient is offering no further requests or medical complaints at this time. Awaiting further MD orders. Plan of care ongoing. Patient is A+Ox4 and resting comfortably. Mother is at the bedside. Respirations are even and unlabored. Patient denies HI, SI, and currently denies visual or auditory hallucinations. Cardiac rate and rhythm is 2+ and regular. Abdomen is round, soft and nontender to palpation. Patient endorses that over-consumption of mac and cheese last night, may have contributed to her R sided abdominal pain. Patient is offering no further requests or medical complaints at this time. Awaiting further MD orders. Plan of care ongoing.

## 2025-01-25 NOTE — ED ADULT NURSE NOTE - OBJECTIVE STATEMENT
Pt arrives to ED A&Ox4, ambulatory at baseline. PMHx of schizophrenia.   Pt c/o right sided abd pain with N/V since last night. Pt also reporting auditory hallucinations. Stating that the voices are saying "I hope you are sick". Pt calm and cooperative, stating she is concerned that voices are making her sick. Mother with pt confirms she did vomit multiple times. Hx: Schizophrenia. MD Leiva called for  reyna, due to N/V pt ot be seen in the main. Pt arrives to ED A&Ox4, ambulatory at baseline. PMHx of schizophrenia. Pt C/O right sided abdominal pain with N/V since 4pm and vomiting. Pt denies CP, SOB, diarrhea, HA, fevers, chills. Pt also states "I need to talk to the doctor about my mental disease" Pt did not specify specifics and requested to speak to the doctor. Respirations are even and unlabored, 20G IV placed in right wrist, labs drawn and sent. Pt denies SI/ HI. Bed in lowest position, comfort measures provided, safety maintained. pending CT.

## 2025-01-25 NOTE — ED PROVIDER NOTE - OBJECTIVE STATEMENT
30F with a hx of schizophrenia presenting for abdominal pain that began last evening. Pt notes she had mac and cheese around 5pm and felt bloated/full after. Notes her nausea began a few hours later and she 30F with a hx of schizophrenia presenting for abdominal pain that began last evening. Pt notes she had mac and cheese around 5pm and felt bloated/full after. Notes her nausea began a few hours later and she experienced a few episodes of vomiting. Emesis was noted to be NBNB. Pt took Mylanta after and notes slight improvement in her symptoms. Denies any further episodes of emesis since. Denies chest pain, SOB, palpitations, dysuria, melena, hematochezia, lightheadedness, dizziness, flu-like symptoms. Does endorse auditory and visual hallucinations that have been ongoing for years and increased in the past few days. Pt notes seeing people that tell her to do things. Currently does not have these hallucinations. Pt is on abilify 5 qhs and haldol IM monthly (unsure dose).

## 2025-01-25 NOTE — ED ADULT NURSE NOTE - CHIEF COMPLAINT QUOTE
Pt c/o right sided abd pain with N/V since last night. Pt also reporting auditory hallucinations. Stating that the voices are saying "I hope you are sick". Pt calm and cooperative, stating she is concerned that voices are making her sick. Mother with pt confirms she did vomit multiple times. Hx: Schizophrenia. MD Leiva called for  reyna, due to N/V pt ot be seen in the main.

## 2025-01-25 NOTE — ED ADULT NURSE NOTE - HISTORY OF COVID-19 VACCINATION
Intubation    Patient location during procedure: done in OR  Staffing  Resident/CRNA: BLADE RAMOS  Performed: resident/CRNA   Anesthesiologist was present at the time of the procedure.  Preanesthetic Checklist  Completed: patient identified, site marked, surgical consent, pre-op evaluation, timeout performed, IV checked, risks and benefits discussed, monitors and equipment checked and anesthesia consent given  Intubation  Indication: airway protection, surgery  Pre-oxygenation. Induction: intravenous, mask ventilation: easy mask.  Intubation: postinduction, laryngoscopy direct, Romo 2.  Endotracheal Tube: oral, 7.5 mm ID, cuffed (inflated to minimal occlusive pressure)  Attempts: 1, Grade I - full view of cords  Complicating Factors: none  Tube secured at 22 cm at the lips.  Findings post-intubation: bilateral breath sounds, positive ETCO2, atraumatic / condition of teeth unchanged  Position Confirmation: auscultation  Eye Care: taped after induction / before airway management            
Vaccine status unknown

## 2025-01-25 NOTE — ED PROVIDER NOTE - NSFOLLOWUPINSTRUCTIONS_ED_ALL_ED_FT
Please follow up with your PCP within 1 week. If your symptoms return, call your doctor. Please follow up with your psychiatrist for your hallucinations. Please follow up with your PCP within 1 week. If your symptoms return, call your doctor. Please follow up with your psychiatrist for your hallucinations.    Abdominal Pain    WHAT YOU NEED TO KNOW:    What do I need to know about abdominal pain? Abdominal pain may be felt anywhere between the bottom of your rib cage and your groin. Acute pain usually lasts less than 3 months. Chronic pain lasts longer than 3 months. Your pain may be sharp or dull. The pain may stay in the same place or move around. You may have the pain all the time, or it may come and go. Depending on the cause, you may also have nausea, vomiting, fever, or diarrhea.  Abdominal Organs    What causes abdominal pain? The cause may not be found. The following are common causes:    Overeating, gas pains, or food poisoning    Constipation or diarrhea    An injury    Appendicitis, a hernia, or an ulcer    Infection or a blockage    A liver, gallbladder, or kidney condition  How is the cause of abdominal pain diagnosed? Your healthcare provider will check your abdomen. He or she will ask where your pain is and when it started. Tell him or her if the pain wakes you or stops you from doing your daily activities. Describe anything that makes the pain better or worse. You may also need any of the following:    Blood, urine, or bowel movement samples may be tested for signs of an infection, disease, or injury.    X-ray pictures of your abdomen may show an injury or other cause of the pain.  How is abdominal pain treated?    Prescription pain medicine may be given. Ask your healthcare provider how to take this medicine safely. Some prescription pain medicines contain acetaminophen. Do not take other medicines that contain acetaminophen without talking to your healthcare provider. Too much acetaminophen may cause liver damage. Prescription pain medicine may cause constipation. Ask your healthcare provider how to prevent or treat constipation.    Medicines may be given to calm your stomach or prevent vomiting.    Relaxation therapy may be used along with pain medicine.    Surgery may be needed, depending on the cause.  What can I do to manage or prevent abdominal pain?    Apply heat on your abdomen for 20 to 30 minutes every 2 hours for as many days as directed. Heat helps decrease pain and muscle spasms.    Make changes to the foods you eat, if needed. Do not eat foods that cause abdominal pain or other symptoms. Eat small meals more often. The following changes may also help:  Eat more high-fiber foods if you are constipated. High-fiber foods include fruits, vegetables, whole-grain foods, and legumes such as kapadia beans.        Do not eat foods that cause gas if you have bloating. Examples include broccoli, cabbage, beans, and carbonated drinks.    Do not eat foods or drinks that contain sorbitol or fructose if you have diarrhea and bloating. Some examples are fruit juices, candy, jelly, and sugar-free gum.    Do not eat high-fat foods. Examples include fried foods, cheeseburgers, hot dogs, and desserts.    Make changes to the liquids you drink, if needed. Do not drink liquids that cause pain or make it worse, such as orange juice. Drink liquids throughout the day to stay hydrated. The following changes may also help:  Drink more liquids to prevent dehydration from diarrhea or vomiting. Ask your healthcare provider how much liquid to drink each day and which liquids are best for you.    Limit or do not have caffeine. Caffeine may make symptoms such as heartburn or nausea worse.    Limit or do not drink alcohol. Alcohol can make your abdominal pain worse. Ask your healthcare provider if it is okay for you to drink alcohol. Also ask how much is okay for you to drink. A drink of alcohol is 12 ounces of beer, ½ ounce of liquor, or 5 ounces of wine.    Keep a diary of your abdominal pain. A diary may help your healthcare provider learn what is causing your pain. Include when the pain happens, how long it lasts, and what the pain feels like. Write down any other symptoms you have with abdominal pain. Also write down what you eat, and any symptoms you have after you eat.    Manage stress. Stress may cause abdominal pain. Your healthcare provider may recommend relaxation techniques and deep breathing exercises to help decrease your stress. Your healthcare provider may recommend you talk to someone about your stress or anxiety, such as a counselor or a friend. Get plenty of sleep. Exercise regularly.  [employer] Family Walking for Exercise      Do not smoke. Nicotine and other chemicals in cigarettes can damage your esophagus and stomach. Ask your healthcare provider for information if you currently smoke and need help to quit. E-cigarettes or smokeless tobacco still contain nicotine. Talk to your healthcare provider before you use these products.  Call your local emergency number (911 in the [place]) if:    You have chest pain or shortness of breath.    When should I seek immediate care?    You have pulsing pain in your upper abdomen or lower back that suddenly becomes constant.    Your pain is in the right lower abdominal area and worsens with movement.    You have a fever over 100.4°F (38°C) or shaking chills.    You are vomiting and cannot keep food or liquids down.    Your pain does not improve or gets worse over the next 8 to 12 hours.    You see blood in your vomit or bowel movements, or they look black and tarry.    Your skin or the whites of your eyes turn yellow.    You are a woman and have a large amount of vaginal bleeding that is not your monthly period.  When should I call my doctor?    You have pain in your lower back.    You are a man and have pain in your testicles.    You have pain when you urinate.    You have questions or concerns about your condition or care.  CARE AGREEMENT:    You have the right to help plan your care. Learn about your health condition and how it may be treated. Discuss treatment options with your healthcare providers to decide what care you want to receive. You always have the right to refuse treatment.

## 2025-01-25 NOTE — ED PROVIDER NOTE - CLINICAL SUMMARY MEDICAL DECISION MAKING FREE TEXT BOX
30F with a hx of schizophrenia presenting for abdominal pain that began last evening. Pt notes she had mac and cheese around 5pm and felt bloated/full after. Notes her nausea began a few hours later and she experienced a few episodes of vomiting. Emesis was noted to be NBNB. Pt took Mylanta after and notes slight improvement in her symptoms. Denies any further episodes of emesis since. Denies chest pain, SOB, palpitations, dysuria, melena, hematochezia, lightheadedness, dizziness, flu-like symptoms. Does endorse auditory and visual hallucinations that have been ongoing for years and increased in the past few days. Pt notes seeing people that tell her to do things. Currently does not have these hallucinations. Pt is on abilify 5 qhs and haldol IM monthly (unsure dose). Exam with RLQ tenderness to palpation but otherwise benign. Given current symptoms and exam finding, will obtain CT A/P to r/o appendicitis or other causes of emesis.  consulted for pt's increase hallucinations. 30F with a hx of schizophrenia presenting for abdominal pain that began last evening. Pt notes she had mac and cheese around 5pm and felt bloated/full after. Notes her nausea began a few hours later and she experienced a few episodes of vomiting. Emesis was noted to be NBNB. Pt took Mylanta after and notes slight improvement in her symptoms. Denies any further episodes of emesis since. Denies chest pain, SOB, palpitations, dysuria, melena, hematochezia, lightheadedness, dizziness, flu-like symptoms. Does endorse auditory and visual hallucinations that have been ongoing for years and increased in the past few days. Pt notes seeing people that tell her to do things. Currently does not have these hallucinations. Pt is on abilify 5 qhs and haldol IM monthly (unsure dose). Exam with RLQ tenderness to palpation but otherwise benign. Given current symptoms and exam finding, will obtain CT A/P to r/o appendicitis or other causes of emesis.     CT A/P returned without acute abdominal pathology. Pt GI symptoms resolved. Stable for discharge.

## 2025-02-03 PROCEDURE — 90833 PSYTX W PT W E/M 30 MIN: CPT

## 2025-02-03 PROCEDURE — 99214 OFFICE O/P EST MOD 30 MIN: CPT

## 2025-02-27 ENCOUNTER — INPATIENT (INPATIENT)
Facility: HOSPITAL | Age: 30
LOS: 6 days | Discharge: HOME | End: 2025-03-06
Attending: STUDENT IN AN ORGANIZED HEALTH CARE EDUCATION/TRAINING PROGRAM | Admitting: PSYCHIATRY & NEUROLOGY
Payer: MEDICAID

## 2025-02-27 VITALS
OXYGEN SATURATION: 100 % | RESPIRATION RATE: 18 BRPM | DIASTOLIC BLOOD PRESSURE: 82 MMHG | WEIGHT: 229.94 LBS | HEIGHT: 64 IN | SYSTOLIC BLOOD PRESSURE: 119 MMHG | TEMPERATURE: 98 F | HEART RATE: 75 BPM

## 2025-02-27 DIAGNOSIS — F20.9 SCHIZOPHRENIA, UNSPECIFIED: ICD-10-CM

## 2025-02-27 LAB
A1C WITH ESTIMATED AVERAGE GLUCOSE RESULT: 5.1 % — SIGNIFICANT CHANGE UP (ref 4–5.6)
ALBUMIN SERPL ELPH-MCNC: 4.1 G/DL — SIGNIFICANT CHANGE UP (ref 3.3–5)
ALP SERPL-CCNC: 78 U/L — SIGNIFICANT CHANGE UP (ref 40–120)
ALT FLD-CCNC: 17 U/L — SIGNIFICANT CHANGE UP (ref 4–33)
ANION GAP SERPL CALC-SCNC: 12 MMOL/L — SIGNIFICANT CHANGE UP (ref 7–14)
APAP SERPL-MCNC: <10 UG/ML — LOW (ref 15–25)
APPEARANCE UR: CLEAR — SIGNIFICANT CHANGE UP
AST SERPL-CCNC: 16 U/L — SIGNIFICANT CHANGE UP (ref 4–32)
BASOPHILS # BLD AUTO: 0.03 K/UL — SIGNIFICANT CHANGE UP (ref 0–0.2)
BASOPHILS NFR BLD AUTO: 0.6 % — SIGNIFICANT CHANGE UP (ref 0–2)
BILIRUB SERPL-MCNC: 0.2 MG/DL — SIGNIFICANT CHANGE UP (ref 0.2–1.2)
BILIRUB UR-MCNC: NEGATIVE — SIGNIFICANT CHANGE UP
BUN SERPL-MCNC: 6 MG/DL — LOW (ref 7–23)
CALCIUM SERPL-MCNC: 9.1 MG/DL — SIGNIFICANT CHANGE UP (ref 8.4–10.5)
CHLORIDE SERPL-SCNC: 104 MMOL/L — SIGNIFICANT CHANGE UP (ref 98–107)
CHOLEST SERPL-MCNC: 148 MG/DL — SIGNIFICANT CHANGE UP
CO2 SERPL-SCNC: 23 MMOL/L — SIGNIFICANT CHANGE UP (ref 22–31)
COLOR SPEC: YELLOW — SIGNIFICANT CHANGE UP
CREAT SERPL-MCNC: 0.68 MG/DL — SIGNIFICANT CHANGE UP (ref 0.5–1.3)
DIFF PNL FLD: NEGATIVE — SIGNIFICANT CHANGE UP
EGFR: 119 ML/MIN/1.73M2 — SIGNIFICANT CHANGE UP
EGFR: 119 ML/MIN/1.73M2 — SIGNIFICANT CHANGE UP
EOSINOPHIL # BLD AUTO: 0.08 K/UL — SIGNIFICANT CHANGE UP (ref 0–0.5)
EOSINOPHIL NFR BLD AUTO: 1.5 % — SIGNIFICANT CHANGE UP (ref 0–6)
ESTIMATED AVERAGE GLUCOSE: 100 — SIGNIFICANT CHANGE UP
ETHANOL SERPL-MCNC: <10 MG/DL — SIGNIFICANT CHANGE UP
GLUCOSE SERPL-MCNC: 80 MG/DL — SIGNIFICANT CHANGE UP (ref 70–99)
GLUCOSE UR QL: NEGATIVE MG/DL — SIGNIFICANT CHANGE UP
HCG SERPL-ACNC: <1 MIU/ML — SIGNIFICANT CHANGE UP
HCT VFR BLD CALC: 37 % — SIGNIFICANT CHANGE UP (ref 34.5–45)
HDLC SERPL-MCNC: 50 MG/DL — LOW
HGB BLD-MCNC: 11.6 G/DL — SIGNIFICANT CHANGE UP (ref 11.5–15.5)
IANC: 2.43 K/UL — SIGNIFICANT CHANGE UP (ref 1.8–7.4)
IMM GRANULOCYTES NFR BLD AUTO: 0.2 % — SIGNIFICANT CHANGE UP (ref 0–0.9)
KETONES UR-MCNC: NEGATIVE MG/DL — SIGNIFICANT CHANGE UP
LEUKOCYTE ESTERASE UR-ACNC: NEGATIVE — SIGNIFICANT CHANGE UP
LIPID PNL WITH DIRECT LDL SERPL: 86 MG/DL — SIGNIFICANT CHANGE UP
LYMPHOCYTES # BLD AUTO: 2.26 K/UL — SIGNIFICANT CHANGE UP (ref 1–3.3)
LYMPHOCYTES # BLD AUTO: 43.4 % — SIGNIFICANT CHANGE UP (ref 13–44)
MCHC RBC-ENTMCNC: 27.8 PG — SIGNIFICANT CHANGE UP (ref 27–34)
MCHC RBC-ENTMCNC: 31.4 G/DL — LOW (ref 32–36)
MCV RBC AUTO: 88.5 FL — SIGNIFICANT CHANGE UP (ref 80–100)
MONOCYTES # BLD AUTO: 0.4 K/UL — SIGNIFICANT CHANGE UP (ref 0–0.9)
MONOCYTES NFR BLD AUTO: 7.7 % — SIGNIFICANT CHANGE UP (ref 2–14)
NEUTROPHILS # BLD AUTO: 2.43 K/UL — SIGNIFICANT CHANGE UP (ref 1.8–7.4)
NEUTROPHILS NFR BLD AUTO: 46.6 % — SIGNIFICANT CHANGE UP (ref 43–77)
NITRITE UR-MCNC: NEGATIVE — SIGNIFICANT CHANGE UP
NON HDL CHOLESTEROL: 98 MG/DL — SIGNIFICANT CHANGE UP
NRBC # BLD AUTO: 0 K/UL — SIGNIFICANT CHANGE UP (ref 0–0)
NRBC # FLD: 0 K/UL — SIGNIFICANT CHANGE UP (ref 0–0)
NRBC BLD AUTO-RTO: 0 /100 WBCS — SIGNIFICANT CHANGE UP (ref 0–0)
PCP SPEC-MCNC: SIGNIFICANT CHANGE UP
PH UR: 7.5 — SIGNIFICANT CHANGE UP (ref 5–8)
PLATELET # BLD AUTO: 346 K/UL — SIGNIFICANT CHANGE UP (ref 150–400)
POTASSIUM SERPL-MCNC: 4.2 MMOL/L — SIGNIFICANT CHANGE UP (ref 3.5–5.3)
POTASSIUM SERPL-SCNC: 4.2 MMOL/L — SIGNIFICANT CHANGE UP (ref 3.5–5.3)
PROT SERPL-MCNC: 7.8 G/DL — SIGNIFICANT CHANGE UP (ref 6–8.3)
PROT UR-MCNC: NEGATIVE MG/DL — SIGNIFICANT CHANGE UP
RBC # BLD: 4.18 M/UL — SIGNIFICANT CHANGE UP (ref 3.8–5.2)
RBC # FLD: 17.1 % — HIGH (ref 10.3–14.5)
SALICYLATES SERPL-MCNC: <0.3 MG/DL — LOW (ref 15–30)
SARS-COV-2 RNA SPEC QL NAA+PROBE: SIGNIFICANT CHANGE UP
SODIUM SERPL-SCNC: 139 MMOL/L — SIGNIFICANT CHANGE UP (ref 135–145)
SP GR SPEC: 1.01 — SIGNIFICANT CHANGE UP (ref 1–1.03)
TOXICOLOGY SCREEN, DRUGS OF ABUSE, SERUM RESULT: SIGNIFICANT CHANGE UP
TRIGL SERPL-MCNC: 57 MG/DL — SIGNIFICANT CHANGE UP
TSH SERPL-MCNC: 0.92 UIU/ML — SIGNIFICANT CHANGE UP (ref 0.27–4.2)
UROBILINOGEN FLD QL: 0.2 MG/DL — SIGNIFICANT CHANGE UP (ref 0.2–1)
WBC # BLD: 5.21 K/UL — SIGNIFICANT CHANGE UP (ref 3.8–10.5)
WBC # FLD AUTO: 5.21 K/UL — SIGNIFICANT CHANGE UP (ref 3.8–10.5)

## 2025-02-27 PROCEDURE — 99285 EMERGENCY DEPT VISIT HI MDM: CPT

## 2025-02-27 PROCEDURE — 99232 SBSQ HOSP IP/OBS MODERATE 35: CPT

## 2025-02-27 RX ORDER — MELATONIN 5 MG
3 TABLET ORAL AT BEDTIME
Refills: 0 | Status: DISCONTINUED | OUTPATIENT
Start: 2025-02-27 | End: 2025-03-06

## 2025-02-27 RX ORDER — LORAZEPAM 4 MG/ML
2 VIAL (ML) INJECTION ONCE
Refills: 0 | Status: DISCONTINUED | OUTPATIENT
Start: 2025-02-27 | End: 2025-03-04

## 2025-02-27 RX ORDER — LORAZEPAM 4 MG/ML
2 VIAL (ML) INJECTION EVERY 6 HOURS
Refills: 0 | Status: DISCONTINUED | OUTPATIENT
Start: 2025-02-27 | End: 2025-03-04

## 2025-02-27 RX ORDER — ARIPIPRAZOLE 2 MG/1
10 TABLET ORAL AT BEDTIME
Refills: 0 | Status: DISCONTINUED | OUTPATIENT
Start: 2025-02-27 | End: 2025-02-28

## 2025-02-27 RX ORDER — ACETAMINOPHEN 500 MG/5ML
650 LIQUID (ML) ORAL EVERY 6 HOURS
Refills: 0 | Status: DISCONTINUED | OUTPATIENT
Start: 2025-02-27 | End: 2025-03-06

## 2025-02-27 RX ORDER — METFORMIN HYDROCHLORIDE 850 MG/1
500 TABLET ORAL DAILY
Refills: 0 | Status: DISCONTINUED | OUTPATIENT
Start: 2025-02-27 | End: 2025-03-06

## 2025-02-27 RX ORDER — HALOPERIDOL 10 MG/1
5 TABLET ORAL EVERY 6 HOURS
Refills: 0 | Status: DISCONTINUED | OUTPATIENT
Start: 2025-02-27 | End: 2025-03-06

## 2025-02-27 RX ORDER — HALOPERIDOL 10 MG/1
5 TABLET ORAL ONCE
Refills: 0 | Status: DISCONTINUED | OUTPATIENT
Start: 2025-02-27 | End: 2025-03-06

## 2025-02-27 RX ORDER — POLYETHYLENE GLYCOL 3350 17 G/17G
17 POWDER, FOR SOLUTION ORAL DAILY
Refills: 0 | Status: DISCONTINUED | OUTPATIENT
Start: 2025-02-27 | End: 2025-03-06

## 2025-02-27 RX ADMIN — ARIPIPRAZOLE 10 MILLIGRAM(S): 2 TABLET ORAL at 21:03

## 2025-02-27 NOTE — BH INPATIENT PSYCHIATRY ASSESSMENT NOTE - HPI (INCLUDE ILLNESS QUALITY, SEVERITY, DURATION, TIMING, CONTEXT, MODIFYING FACTORS, ASSOCIATED SIGNS AND SYMPTOMS)
Per initial ED Behavioral Health Assessment Note:    "29 yo woman, on SSD, PPHx ASD w/ schizophrenia, at least 9 previous admissions- last at [facility] 5/9-5/22 2024 , multiple ED presentations, no hx of suicide attempts, history of aggression in the past (hitting), no legal issues, no substance use. PMH of obesity, pre-diabetes who in treatment at [facility] AOPD, last seen on 2/3/25, bib today by her mom for worsening of +ah, depression and anxiety.      In today's assessment, the patient reported coming to the ED due to increased feelings of depression and anxiety, as well as more frequent auditory hallucinations. She endorses chronic auditory hallucinations, describing the voices saying they are  "bigger and better" than her and making "vain" remarks.  She stated that over the past week, the voices have become louder, and her coping skills are no longer effective.  She attributes her increased depression and anxiety over the last few days to these intensified auditory hallucinations .In addition to auditory hallucinations, she reports chronic visual hallucinations of "dark spirits and angels."  She described having a demonic side that will have to kill the voices with either a knife or chainsaw, but clarified this would be done "spiritually," not with a physical weapon.  She denies command auditory hallucinations.    The patient also reported poor sleep over the last few days, attributing it to the voices, and stated she is only sleeping about four hours a night.  Last week, she missed one day of her program due to feeling overwhelmed by the voices. She reports crying more often because of the worsening auditory hallucinations. This AM she called [phone] because she was crying, felt overwhelmed by the voices and didn't know what to do.    Patient denies thought insertion/withdrawal, denies referential thought processes & is not paranoid on interview. Patient does not report nor exhibit any signs of talha, including irritable or elevated mood, grandiosity, pressured speech, risk-taking behaviors, increase in productivity or agitation. Patient denies changes appetite. Patient adamantly denies SI, intent or plan; denies any HI, violent thoughts.     Spoke with Dr. Crowe requesting call to ED- He reports patient was in respite recently in [place] x 1 week last month. She was feeling lonely and depressed/AH were bothersome. She has been struggling recently. She recently restarted individual therapy and asked for group therapy. She is normally very busy. He stated patient occasionally will miss a dose of Abilify but is otherwise compliant. He is not sure what the precipitating factor is. He does not think there is secondary gain. He stated if patient wants admission that would be fine.     Called [facility] program [phone]- LVE requesting call back.    Spoke with Mother (see  note)- Per mother patient takes medication at night because it makes her drowsy in the AM. So she switched to QHS.  She misses medication 3x a week. Mother stated medication has to be crushed and mixed with applesauce. Mother stated she will get home 730p/8p and patient will already be sleeping and miss her dose of Abilify. Trigger may have been patient being bullied in her program by a peer- that peer was reported and was not longer in the program.     See  note for collateral information"    On my interview with patient upon admission to [facility] [place]: Patient states that she has been feeling more depressed, anxious, and hearing more voices over the past few days without a clear trigger. Notes that she has missed a few doses of abilify because she fell asleep. Notes that she also has VH of seeing angels which at times bothers here. She denies any SI/HI. Notes that prior to her menstrual cycle in the winter she notices that she tends to experience more depression. Shortly after interview, patient stated that she was already feeling better.

## 2025-02-27 NOTE — BH PATIENT PROFILE - NSICDXPASTMEDICALHX_GEN_ALL_CORE_FT
PAST MEDICAL HISTORY:  Autism     Autism     Pre-diabetes     Schizo-affective psychosis     Schizophrenia

## 2025-02-27 NOTE — ED BEHAVIORAL HEALTH ASSESSMENT NOTE - NSBHCOLLATERAL1PROFPHONE_PSY_ALL_CORE
"Chief Complaint   Patient presents with    Physical     Not fasting        Initial /64 (BP Location: Right arm, Patient Position: Sitting, Cuff Size: Adult Regular)   Pulse 75   Temp (!) 96.5  F (35.8  C) (Tympanic)   Resp 16   Ht 1.595 m (5' 2.8\")   Wt 70.8 kg (156 lb)   LMP 10/01/2004 (Approximate)   SpO2 95%   BMI 27.81 kg/m   Estimated body mass index is 27.81 kg/m  as calculated from the following:    Height as of this encounter: 1.595 m (5' 2.8\").    Weight as of this encounter: 70.8 kg (156 lb).  Medication Review: complete    The next two questions are to help us understand your food security.  If you are feeling you need any assistance in this area, we have resources available to support you today.          6/9/2024   SDOH- Food Insecurity   Within the past 12 months, did you worry that your food would run out before you got money to buy more? N   Within the past 12 months, did the food you bought just not last and you didn t have money to get more? N         Health Care Directive:  Patient does not have a Health Care Directive or Living Will: Patient states has Advance Directive and will bring in a copy to clinic.    Annemarie Lipscomb CMA([facility])      "
[phone]

## 2025-02-27 NOTE — BH PATIENT PROFILE - HOME MEDICATIONS
metFORMIN 500 mg oral tablet , 1 tab(s) orally once a day  Abilify 10 mg oral tablet , 1 tab(s) orally once a day (at bedtime)  haloperidol decanoate 100 mg/mL intramuscular solution , 200 milligram(s) intramuscularly every 4 weeks

## 2025-02-27 NOTE — ED PROVIDER NOTE - CLINICAL SUMMARY MEDICAL DECISION MAKING FREE TEXT BOX
pt with schizoaffective disorder in ED with worsening of auditory hallucinations, unclear trigger, possibly worsening of baseline psychiatric disorder in the context of needing next dose of ZHAO haloperidol; pt calm and without SI/HI, she is not violent, and she is easily able to be interviewed; low suspicion for acute medical condition as cause of pt's symptoms; plan to have pt evaluated by psychiatry for further recommendations

## 2025-02-27 NOTE — ED BEHAVIORAL HEALTH ASSESSMENT NOTE - HPI (INCLUDE ILLNESS QUALITY, SEVERITY, DURATION, TIMING, CONTEXT, MODIFYING FACTORS, ASSOCIATED SIGNS AND SYMPTOMS)
29 yo woman, on SSD, PPHx ASD w/ schizophrenia, at least 9 previous admissions- last at [facility] 5/9-5/22 2025 , multiple ED presentations, no hx of suicide attempts, history of aggression in the past (hitting), no legal issues, no substance use. PMH of obesity, pre-diabetes who in treatment at [facility] AOPD, last seen on 2/3/25, bib today by her mom for worsening of +ah, depression and anxiety.      In today's assessment, the patient reported coming to the ED due to increased feelings of depression and anxiety, as well as more frequent auditory hallucinations. She endorses chronic auditory hallucinations, describing the voices saying they are  "bigger and better" than her and making "vain" remarks.  She stated that over the past week, the voices have become louder, and her coping skills are no longer effective.  She attributes her increased depression and anxiety over the last few days to these intensified auditory hallucinations .In addition to auditory hallucinations, she reports chronic visual hallucinations of "dark spirits and angels."  She described having a demonic side that will have to kill the voices with either a knife or chainsaw, but clarified this would be done "spiritually," not with a physical weapon.  She denies command auditory hallucinations.    The patient also reported poor sleep over the last few days, attributing it to the voices, and stated she is only sleeping about four hours a night.  Last week, she missed one day of her program due to feeling overwhelmed by the voices. She reports crying more often because of the worsening auditory hallucinations. This AM she called [phone] because she was crying, felt overwhelmed by the voices and didn't know what to do.    Patient denies thought insertion/withdrawal, denies referential thought processes & is not paranoid on interview. Patient does not report nor exhibit any signs of talha, including irritable or elevated mood, grandiosity, pressured speech, risk-taking behaviors, increase in productivity or agitation. Patient denies changes appetite. Patient adamantly denies SI, intent or plan; denies any HI, violent thoughts.     Spoke with Dr. Crowe requesting call to ED- He reports patient was in respite recently in [place] x 1 week last month. She was feeling lonely and depressed/AH were bothersome. She has been struggling recently. She recently restarted individual therapy and asked for group therapy. She is normally very busy. He stated patient occasionally will miss a dose of Abilify but is otherwise compliant. He is not sure what the precipitating factor is. He does not think there is secondary gain. He stated if patient wants admission that would be fine.     Called [facility] program [phone]- GQT requesting call back.    Spoke with Mother (see  note)- Per mother patient takes medication at night because it makes her drowsy in the AM. So she switched to QHS.  She misses medication 3x a week. Mother stated medication has to be crushed and mixed with applesauce. Mother stated she will get home 730p/8p and patient will already be sleeping and miss her dose of Abilify. Trigger may have been patient being bullied in her program by a peer- that peer was reported and was not longer in the program.     See  note for collateral information 29 yo woman, on SSD, PPHx ASD w/ schizophrenia, at least 9 previous admissions- last at [facility] 5/9-5/22 2024 , multiple ED presentations, no hx of suicide attempts, history of aggression in the past (hitting), no legal issues, no substance use. PMH of obesity, pre-diabetes who in treatment at [facility] AOP[facility], last seen on 2/3/25, bib today by her mom for worsening of +ah, depression and anxiety.      In today's assessment, the patient reported coming to the ED due to increased feelings of depression and anxiety, as well as more frequent auditory hallucinations. She endorses chronic auditory hallucinations, describing the voices saying they are  "bigger and better" than her and making "vain" remarks.  She stated that over the past week, the voices have become louder, and her coping skills are no longer effective.  She attributes her increased depression and anxiety over the last few days to these intensified auditory hallucinations .In addition to auditory hallucinations, she reports chronic visual hallucinations of "dark spirits and angels."  She described having a demonic side that will have to kill the voices with either a knife or chainsaw, but clarified this would be done "spiritually," not with a physical weapon.  She denies command auditory hallucinations.    The patient also reported poor sleep over the last few days, attributing it to the voices, and stated she is only sleeping about four hours a night.  Last week, she missed one day of her program due to feeling overwhelmed by the voices. She reports crying more often because of the worsening auditory hallucinations. This AM she called [phone] because she was crying, felt overwhelmed by the voices and didn't know what to do.    Patient denies thought insertion/withdrawal, denies referential thought processes & is not paranoid on interview. Patient does not report nor exhibit any signs of talha, including irritable or elevated mood, grandiosity, pressured speech, risk-taking behaviors, increase in productivity or agitation. Patient denies changes appetite. Patient adamantly denies SI, intent or plan; denies any HI, violent thoughts.     Spoke with Dr. Crowe requesting call to ED- He reports patient was in respite recently in [place] x 1 week last month. She was feeling lonely and depressed/AH were bothersome. She has been struggling recently. She recently restarted individual therapy and asked for group therapy. She is normally very busy. He stated patient occasionally will miss a dose of Abilify but is otherwise compliant. He is not sure what the precipitating factor is. He does not think there is secondary gain. He stated if patient wants admission that would be fine.     Called [facility] program [phone]- QSW requesting call back.    Spoke with Mother (see  note)- Per mother patient takes medication at night because it makes her drowsy in the AM. So she switched to QHS.  She misses medication 3x a week. Mother stated medication has to be crushed and mixed with applesauce. Mother stated she will get home 730p/8p and patient will already be sleeping and miss her dose of Abilify. Trigger may have been patient being bullied in her program by a peer- that peer was reported and was not longer in the program.     See  note for collateral information

## 2025-02-27 NOTE — ED BEHAVIORAL HEALTH ASSESSMENT NOTE - NSBHATTESTAPPAMEND_PSY_A_CORE
I have personally seen and examined this patient. I fully participated in the care of this patient. I have made amendments to the documentation where appropriate and otherwise agree with the history, physical exam, and plan as documented by the HEATHER

## 2025-02-27 NOTE — ED ADULT NURSE REASSESSMENT NOTE - NS ED NURSE REASSESS COMMENT FT1
Report received from TASH Peace. Pt sleeping in stretcher, NAD, reps equal and unlabored. No evidence for AH or anxiety at this time. Awaiting bed placement. Safety maintained.

## 2025-02-27 NOTE — BH INPATIENT PSYCHIATRY ASSESSMENT NOTE - NSBHMETABOLIC_PSY_ALL_CORE_FT
BMI: BMI (kg/m2): 39.4 (02-27-25 @ 07:28)  HbA1c: A1C with Estimated Average Glucose Result: 5.1 % (02-27-25 @ 10:32)    Glucose: POCT Blood Glucose.: 88 mg/dL (02-27-25 @ 07:30)    BP: 99/61 (02-27-25 @ 12:37) (99/61 - 119/82)Vital Signs Last 24 Hrs  T(C): 36.6 (02-27-25 @ 12:37), Max: 36.6 (02-27-25 @ 12:37)  T(F): 97.8 (02-27-25 @ 12:37), Max: 97.8 (02-27-25 @ 12:37)  HR: 88 (02-27-25 @ 12:37) (75 - 88)  BP: 99/61 (02-27-25 @ 12:37) (99/61 - 119/82)  BP(mean): 72 (02-27-25 @ 12:37) (72 - 72)  RR: 18 (02-27-25 @ 12:37) (18 - 18)  SpO2: 97% (02-27-25 @ 12:37) (97% - 100%)      Lipid Panel: Date/Time: 02-27-25 @ 10:32  Cholesterol, Serum: 148  LDL Cholesterol Calculated: 86  HDL Cholesterol, Serum: 50  Total Cholesterol/HDL Ration Measurement: --  Triglycerides, Serum: 57   BMI: BMI (kg/m2): 40.5 (02-27-25 @ 14:40)  HbA1c: A1C with Estimated Average Glucose Result: 5.1 % (02-27-25 @ 10:32)    Glucose: POCT Blood Glucose.: 88 mg/dL (02-27-25 @ 07:30)    BP: 99/61 (02-27-25 @ 12:37) (99/61 - 119/82)Vital Signs Last 24 Hrs  T(C): 36.2 (02-27-25 @ 14:40), Max: 36.6 (02-27-25 @ 12:37)  T(F): 97.2 (02-27-25 @ 14:40), Max: 97.8 (02-27-25 @ 12:37)  HR: 88 (02-27-25 @ 12:37) (75 - 88)  BP: 99/61 (02-27-25 @ 12:37) (99/61 - 119/82)  BP(mean): 72 (02-27-25 @ 12:37) (72 - 72)  RR: 18 (02-27-25 @ 12:37) (18 - 18)  SpO2: 97% (02-27-25 @ 12:37) (97% - 100%)    Orthostatic VS  02-27-25 @ 14:40  Lying BP: --/-- HR: --  Sitting BP: 112/69 HR: 98  Standing BP: 103/76 HR: 108  Site: --  Mode: --    Lipid Panel: Date/Time: 02-27-25 @ 10:32  Cholesterol, Serum: 148  LDL Cholesterol Calculated: 86  HDL Cholesterol, Serum: 50  Total Cholesterol/HDL Ration Measurement: --  Triglycerides, Serum: 57

## 2025-02-27 NOTE — ED ADULT TRIAGE NOTE - CHIEF COMPLAINT QUOTE
Pt with past history of  Autism, schizo affective psychosis comes in with her mother for feeling of depression and hearing voices telling her that they are bigger and better than her which is making the patient overwhelmed. She denies any S.I or H.I. she is calm and cooperative in triage.

## 2025-02-27 NOTE — BH INPATIENT PSYCHIATRY ASSESSMENT NOTE - OTHER PAST PSYCHIATRIC HISTORY (INCLUDE DETAILS REGARDING ONSET, COURSE OF ILLNESS, INPATIENT/OUTPATIENT TREATMENT)
PPHx ASD w/ schizophrenia, at least 9 previous admissions- last at [facility] 5/9-5/22 2025 , multiple ED presentations, no hx of suicide attempts, Outpatient with Dr. Crowe. Attends [facility] program 2x a week. Also attends a dance program. Attends a clubhouse.

## 2025-02-27 NOTE — BH INPATIENT PSYCHIATRY ASSESSMENT NOTE - CURRENT MEDICATION
MEDICATIONS  (STANDING):    MEDICATIONS  (PRN):   MEDICATIONS  (STANDING):  ARIPiprazole 10 milliGRAM(s) Oral at bedtime  metFORMIN 500 milliGRAM(s) Oral daily    MEDICATIONS  (PRN):  acetaminophen     Tablet .. 650 milliGRAM(s) Oral every 6 hours PRN Mild Pain (1 - 3), Moderate Pain (4 - 6)  haloperidol     Tablet 5 milliGRAM(s) Oral every 6 hours PRN agitation secondary to psychosis  haloperidol    Injectable 5 milliGRAM(s) IntraMuscular once PRN extreme agitation secondary psychosis  LORazepam     Tablet 2 milliGRAM(s) Oral every 6 hours PRN Anxiety  LORazepam   Injectable 2 milliGRAM(s) IntraMuscular once PRN exreme anxiety  melatonin. 3 milliGRAM(s) Oral at bedtime PRN Insomnia  polyethylene glycol 3350 17 Gram(s) Oral daily PRN constipation

## 2025-02-27 NOTE — BH INPATIENT PSYCHIATRY ASSESSMENT NOTE - NSBHASSESSSUMMFT_PSY_ALL_CORE
31 yo woman, on SSD, PPHx ASD w/ schizophrenia, at least 9 previous admissions- last at [facility] 5/9-5/22 2024, multiple ED presentations, no hx of suicide attempts, history of aggression in the past (hitting), no legal issues, no substance use. PMH of obesity, pre-diabetes who in treatment at [facility], last seen on 2/3/25, bib today by her mom for worsening of +ah, depression and anxiety.    2/27: Patient pleasant and calm on interview but endorsing recent depression, anxiety, and worsening AVH. Reports missing some doses of abilify. Will restart home medications for now and obtain collateral. Shortly after interview patient reported already feeling better. Will continue to evaluate.     Plan:  - Continue Haldol decanoate 200mg IM (due 3/3/25)  - Continue Abilify 10mg QHS  - PRN Haldol 5mg PO/IM PRN, Ativan 2mg PO/IM PRN for agitation  - Obtain collateral  - Dispo: coordinate with [facility]

## 2025-02-27 NOTE — ED BEHAVIORAL HEALTH ASSESSMENT NOTE - OTHER PAST PSYCHIATRIC HISTORY (INCLUDE DETAILS REGARDING ONSET, COURSE OF ILLNESS, INPATIENT/OUTPATIENT TREATMENT)
PPHx ASD w/ schizophrenia, at least 9 previous admissions- last at [facility] 5/9-5/22 2025 , multiple ED presentations, no hx of suicide attempts, Outpatient with Dr. Crowe. Attends [facility] program 2x a week. Also attends a dance program. Attends a clubhouse. PPHx ASD w/ schizophrenia, at least 9 previous admissions- last at [facility] 5/9-5/22 2024, multiple ED presentations, no hx of suicide attempts, Outpatient with Dr. Crowe. Attends [facility] program 2x a week. Also attends a dance program. Attends a clubhouse.

## 2025-02-27 NOTE — BH TREATMENT PLAN - NSTXPATIENTPARTICIPATE_PSY_ALL_CORE
Nursing Transfer Note    Receiving Transfer Note    2022 9:10 PM  Received in transfer from [facility] to PICU 23  Report received as documented in PER Handoff on Doc Flowsheet.  See Doc Flowsheet for VS's and complete assessment.  Continuous EKG monitoring in place Yes  Chart received with patient: Yes  What Caregiver / Guardian was Notified of Arrival: Mother  Patient and / or caregiver / guardian oriented to room and nurse call system.  YESENIA Galvez RN  2022 9:10 PM        
Patient participated in identification of needs/problems/goals for treatment

## 2025-02-27 NOTE — ED BEHAVIORAL HEALTH NOTE - BEHAVIORAL HEALTH NOTE
Writer called patient's mother Michelle [phone] who provided the following information.      Patient resides with mother.  States patient was complaining of auditory hallucinations, saying negative things.  Voices were making fun of her or laughing at her.  Patient wakes up crying in the middle of the night.  Patient is eating well. Patient has been decompensating for the past 2 weeks.  They tried calling [facility] and [facility] and both recommended patient go to the hospital.     [facility] patient has been going to a program [facility] [place] day habilitation from 9:15 am to 1:15pm T, TH,  and is ok in the program.  Patient does dance and is supposed to do a dance recital.  Wednesday Club house [place] the city from 9 to 5. Patient is in treatment at [facility].  Patient is compliant with treatment due for injection Monday March 3.  Patient is also on Abilify at night, has missed a few doses because she falls asleep early.  Patient was last psychiatrically admitted May 2024.       Denies patient wanted to hurt herself, denies any dangerous behavior but states patient was seeking admission.  Patient's mother is agreeable to whatever patient wants.

## 2025-02-27 NOTE — BH INPATIENT PSYCHIATRY ASSESSMENT NOTE - ADDITIONAL DETAILS ALL
NICU Interdisciplinary Rounds     Patient Name: Ac Santizo Diagnosis: Costilla   infant, 1,000-1,249 grams   Date of Admission: 2017 LOS: 6  Gestational Age: Gestational Age: 31w0d Adjusted Gestational Age: 32w2d  Birth Weight: 1.18 kg Current Weight: Weight: (!) 1.14 kg  % of Weight Change: -3%  Growth Curve:  WNL Plan: Increase volume    Respiratory: CPAP    Barriers to D/C: None at this time    Daily Goal: Respiratory and Nutrition  Anticipated Discharge Date: 35 weeks or greater    In Attendance: Nursing, Physician and Respiratory Therapy self reported history of self interrupted suicide attempt at age 18

## 2025-02-27 NOTE — BH INPATIENT PSYCHIATRY ASSESSMENT NOTE - MSE UNSTRUCTURED FT
Appearance: Dressed appropriately.  Attitude / Behavior / relatedness: Calm, cooperative, pleasant  Eye contact: good  Motor: No abnormal movements, no psychomotor slowing or activation.  Speech: Regular rate.  Mood: "depressed and anxious"  Affect: euthymic   Thought Process: linear, logical   Thought Content: denies SI/HI   Perceptual: +AVH  Insight: fair  Judgment: fair  Impulse control: good    Cognition: grossly intact  Gait: Intact.

## 2025-02-27 NOTE — ED BEHAVIORAL HEALTH ASSESSMENT NOTE - RISK ASSESSMENT
acute risk factors- worsening AH/depression/anxiety, worsening sleeping, intermittent compliance with Abilify     chronic risk factors- ASD, chronic mental illness, history of aggression     protective factors- treatment seeking, relatively compliant, no talha/hypomania, no legal issues, no substance use issues

## 2025-02-27 NOTE — ED ADULT TRIAGE NOTE - WEIGHT METHOD
stated Implemented All Universal Safety Interventions:  [place] to call system. Call bell, personal items and telephone within reach. Instruct patient to call for assistance. Room bathroom lighting operational. Non-slip footwear when patient is off stretcher. Physically safe environment: no spills, clutter or unnecessary equipment. Stretcher in lowest position, wheels locked, appropriate side rails in place.

## 2025-02-27 NOTE — BH INPATIENT PSYCHIATRY ASSESSMENT NOTE - NSBHCHARTREVIEWVS_PSY_A_CORE FT
Vital Signs Last 24 Hrs  T(C): 36.6 (02-27-25 @ 12:37), Max: 36.6 (02-27-25 @ 12:37)  T(F): 97.8 (02-27-25 @ 12:37), Max: 97.8 (02-27-25 @ 12:37)  HR: 88 (02-27-25 @ 12:37) (75 - 88)  BP: 99/61 (02-27-25 @ 12:37) (99/61 - 119/82)  BP(mean): 72 (02-27-25 @ 12:37) (72 - 72)  RR: 18 (02-27-25 @ 12:37) (18 - 18)  SpO2: 97% (02-27-25 @ 12:37) (97% - 100%)     Vital Signs Last 24 Hrs  T(C): 36.2 (02-27-25 @ 14:40), Max: 36.6 (02-27-25 @ 12:37)  T(F): 97.2 (02-27-25 @ 14:40), Max: 97.8 (02-27-25 @ 12:37)  HR: 88 (02-27-25 @ 12:37) (75 - 88)  BP: 99/61 (02-27-25 @ 12:37) (99/61 - 119/82)  BP(mean): 72 (02-27-25 @ 12:37) (72 - 72)  RR: 18 (02-27-25 @ 12:37) (18 - 18)  SpO2: 97% (02-27-25 @ 12:37) (97% - 100%)    Orthostatic VS  02-27-25 @ 14:40  Lying BP: --/-- HR: --  Sitting BP: 112/69 HR: 98  Standing BP: 103/76 HR: 108  Site: --  Mode: --

## 2025-02-27 NOTE — ED BEHAVIORAL HEALTH ASSESSMENT NOTE - NSBHATTESTCOMMENTATTENDFT_PSY_A_CORE
29 yo woman, on SSD, PPHx ASD w/ schizophrenia, at least 9 previous admissions- last at [facility] 5/9-5/22 2024 , multiple ED presentations, no hx of suicide attempts, history of aggression in the past (hitting), no legal issues, no substance use. PMH of obesity, pre-diabetes who in treatment at [facility], last seen on 2/3/25, bib today by her mom for worsening of +ah, depression and anxiety.      Patient endorsing worsening psychotic symptoms and anxious thoughts affecting her ability to be maintained safely and function in the community.  Patient requesting voluntary admission.  Patient remains an acute danger to self and others at this time.  Patient lrequesting inpatient psychiatric hospitalization for safety and stabilization.

## 2025-02-27 NOTE — ED PROVIDER NOTE - OBJECTIVE STATEMENT
Dr. Church: 29 yo female with PMH autism and schizoaffective disorder on aripiprazole PO QHS and haloperidol ZHAO (last injection first week of Feb 2025, due for next injection in 4 days on 3/3) seeing psychiatrist Dr. Head, last inpatient psychiatry admission 10 months ago at [facility], on unknown weight loss medication, lives at home with mother, in day program 3+ days a week, brought to ED after pt called [phone] (crisis line) this morning and then expressed to her mother that she is hearing worsening of voices and they are feeling overwhelming.  Pt states to me that she has been hearing voices "for a long time", but they have worsened recently, unclear trigger.  Voices are telling pt, "I'm bigger and stronger and better than you," and pt states that voices are "trying to be fancy".  She denies SI/HI or any other acute complaints, including CP/SOB, N/V/D or abdominal pain.  I spoke to pt's mother in the ED, she corroborates the above information, states that she is unsure what triggered pt to feel worsening of symptoms.

## 2025-02-27 NOTE — ED PROVIDER NOTE - DIFFERENTIAL DIAGNOSIS
Differential Diagnosis schizoaffective disorder, need for ZHAO next dose, low suspicion for acute medical condition

## 2025-02-27 NOTE — BH INPATIENT PSYCHIATRY ASSESSMENT NOTE - RISK ASSESSMENT
acute risk factors- worsening AH/depression/anxiety, worsening sleeping, intermittent compliance with Abilify     chronic risk factors- ASD, chronic mental illness, history of aggression, 1 prior suicide attempt, prior hospitalizations    protective factors- treatment seeking, relatively compliant, no talha/hypomania, no legal issues, no substance use issues
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
## 2025-02-27 NOTE — ED ADULT NURSE NOTE - OBJECTIVE STATEMENT
Pt received from TASH Herrera/Abelardo. Hx: ASD, Schizoaffective disorder. Pt states she has been hearing and increase of voices and VH over the past ferw days causing an increase in anxiety. Pt has been crying and not functioning at baseline. Pt is calm and cooperative at time of receiving Pt and endorses "feeling better". Pt states the voices and VH have calmed down. Voiced have been demeaning Pt saying bad things and telling her to kill "spiritually" not physically. Pt also is seeing dark spirits. Denies; SI, SIB, HI, T/H, paranoia, ETOH/drug use.

## 2025-02-27 NOTE — ED BEHAVIORAL HEALTH ASSESSMENT NOTE - SUMMARY
29 yo woman, on SSD, PPHx ASD w/ schizophrenia, at least 9 previous admissions- last at [facility] 5/9-5/22 2025 , multiple ED presentations, no hx of suicide attempts, history of aggression in the past (hitting), no legal issues, no substance use. PMH of obesity, pre-diabetes who in treatment at [facility], last seen on 2/3/25, bib today by her mom for worsening of +ah, depression and anxiety.    The patient presents with significantly worsening symptoms of psychosis, including auditory and visual hallucinations that are impacting her sleep, daily functioning (missed program last week), and emotional state (increased depression, anxiety, and tearfulness). Patient reports symptoms bothersome, impacting her ability function. Based on collateral- notable change from baseline. Patient is requesting and agreeing to voluntary inpatient admission.    Plan:  1. Admit 9.13  2. Continue Haldol 200mg IM (due 3/3/25)  3. Continue Abilify 10mg QHS - To improve medication compliance with Abilify, consider implementing social interventions such as alarms, notes, or cell phone reminders. Alternatively, discontinuing Abilify and increasing the Haldol dosage could be considered. Defer to inpatient team and consultation with outpatient psychiatrist.  4. PRN Haldol 5mg PO/IM PRN, Ativan 2mg PO/IM PRN  5. Continue Metformin 500mg Daily 31 yo woman, on SSD, PPHx ASD w/ schizophrenia, at least 9 previous admissions- last at [facility] 5/9-5/22 2025 , multiple ED presentations, no hx of suicide attempts, history of aggression in the past (hitting), no legal issues, no substance use. PMH of obesity, pre-diabetes who in treatment at [facility], last seen on 2/3/25, bib today by her mom for worsening of +ah, depression and anxiety.    The patient presents with significantly worsening symptoms of psychosis, including auditory and visual hallucinations that are impacting her sleep, daily functioning (missed program last week), and emotional state (increased depression, anxiety, and tearfulness). Patient reports symptoms bothersome, impacting her ability function. Based on collateral- notable change from baseline. Patient is requesting and agreeing to voluntary inpatient admission.    Plan:  1. Admit 9.13  2. Continue Haldol 200mg IM (due 3/3/25)  3. Continue Abilify 10mg QHS - To improve medication compliance with Abilify, consider implementing social interventions such as alarms, notes, or cell phone reminders. Alternatively, discontinuing Abilify and increasing the Haldol dosage could be considered. Defer to inpatient team and consultation with outpatient psychiatrist.  4. PRN Haldol 5mg PO/IM PRN, Ativan 2mg PO/IM PRN For agitation please attempt verbal de-escalation and behavioral intervention first. If patient does not respond to above, may give recommended PRNs if no contraindications. If patient is refusing PO, remains an imminent danger to self or others and If Patient's  qtc <500, can escalate to IM formulation. If IM antipsychotic is administered, please perform follow-up ECG for QTc monitoring.   5. Continue Metformin 500mg Daily 29 yo woman, on SSD, PPHx ASD w/ schizophrenia, at least 9 previous admissions- last at [facility] 5/9-5/22 2024, multiple ED presentations, no hx of suicide attempts, history of aggression in the past (hitting), no legal issues, no substance use. PMH of obesity, pre-diabetes who in treatment at [facility], last seen on 2/3/25, bib today by her mom for worsening of +ah, depression and anxiety.    The patient presents with significantly worsening symptoms of psychosis, including auditory and visual hallucinations that are impacting her sleep, daily functioning (missed program last week), and emotional state (increased depression, anxiety, and tearfulness). Patient reports symptoms bothersome, impacting her ability function. Based on collateral- notable change from baseline. Patient is requesting and agreeing to voluntary inpatient admission.    Plan:  1. Admit 9.13  2. Continue Haldol 200mg IM (due 3/3/25)  3. Continue Abilify 10mg QHS - To improve medication compliance with Abilify, consider implementing social interventions such as alarms, notes, or cell phone reminders. Alternatively, discontinuing Abilify and increasing the Haldol dosage could be considered. Defer to inpatient team and consultation with outpatient psychiatrist.  4. PRN Haldol 5mg PO/IM PRN, Ativan 2mg PO/IM PRN For agitation please attempt verbal de-escalation and behavioral intervention first. If patient does not respond to above, may give recommended PRNs if no contraindications. If patient is refusing PO, remains an imminent danger to self or others and If Patient's  qtc <500, can escalate to IM formulation. If IM antipsychotic is administered, please perform follow-up ECG for QTc monitoring.   5. Continue Metformin 500mg Daily

## 2025-02-27 NOTE — ED BEHAVIORAL HEALTH ASSESSMENT NOTE - DESCRIPTION
prediabetes domicijose with her family, attends program [facility], clubhouse in [facility] During course of ED visit patient was calm and cooperative. Patient was not aggressive or violent and did not require PRN medications.    ICU Vital Signs Last 24 Hrs  T(C): 36.5 (27 Feb 2025 07:28), Max: 36.5 (27 Feb 2025 07:28)  T(F): 97.7 (27 Feb 2025 07:28), Max: 97.7 (27 Feb 2025 07:28)  HR: 75 (27 Feb 2025 07:28) (75 - 75)  BP: 119/82 (27 Feb 2025 07:28) (119/82 - 119/82)  BP(mean): --  ABP: --  ABP(mean): --  RR: 18 (27 Feb 2025 07:28) (18 - 18)  SpO2: 100% (27 Feb 2025 07:28) (100% - 100%)    O2 Parameters below as of 27 Feb 2025 07:28  Patient On (Oxygen Delivery Method): room air domicijose with her family, attends program [facility], clubhouse in [facility]
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
## 2025-02-28 PROCEDURE — 99232 SBSQ HOSP IP/OBS MODERATE 35: CPT

## 2025-02-28 PROCEDURE — 90853 GROUP PSYCHOTHERAPY: CPT

## 2025-02-28 RX ORDER — ARIPIPRAZOLE 2 MG/1
10 TABLET ORAL ONCE
Refills: 0 | Status: COMPLETED | OUTPATIENT
Start: 2025-02-28 | End: 2025-02-28

## 2025-02-28 RX ORDER — ARIPIPRAZOLE 2 MG/1
10 TABLET ORAL DAILY
Refills: 0 | Status: DISCONTINUED | OUTPATIENT
Start: 2025-03-01 | End: 2025-03-06

## 2025-02-28 RX ORDER — HALOPERIDOL 10 MG/1
200 TABLET ORAL ONCE
Refills: 0 | Status: COMPLETED | OUTPATIENT
Start: 2025-03-03 | End: 2025-03-03

## 2025-02-28 RX ADMIN — METFORMIN HYDROCHLORIDE 500 MILLIGRAM(S): 850 TABLET ORAL at 09:38

## 2025-02-28 RX ADMIN — ARIPIPRAZOLE 10 MILLIGRAM(S): 2 TABLET ORAL at 15:46

## 2025-02-28 NOTE — BH INPATIENT PSYCHIATRY PROGRESS NOTE - NSBHASSESSSUMMFT_PSY_ALL_CORE
31 yo woman, on SSD, PPHx ASD w/ schizophrenia, at least 9 previous admissions- last at [facility] 5/9-5/22 2024, multiple ED presentations, no hx of suicide attempts, history of aggression in the past (hitting), no legal issues, no substance use. PMH of obesity, pre-diabetes who in treatment at [facility], last seen on 2/3/25, bib today by her mom for worsening of +ah, depression and anxiety.    2/28: Patient pleasant and calm on interview. Reports already feeling compared to admission and has been enjoying groups. Will move abilify to daytime dosing as mom will be home to help patient remember to take medication at home. Will plan for haldol dec injection on Monday as planned. Will continue to evaluate symptoms to ensure stability prior to d/c.    Plan:  - Continue Haldol decanoate 200mg IM (due 3/3/25)  - Switch Abilify from 10mg QHS to 10 mg QD  - PRN Haldol 5mg PO/IM PRN, Ativan 2mg PO/IM PRN for agitation  - Obtain collateral  - Dispo: coordinate with [facility]

## 2025-02-28 NOTE — PSYCHIATRIC REHAB INITIAL EVALUATION - NSBHALCSUBCOMMRES_PSY_ALL_CORE
[facility] ([facility])/day-hab/treatment program Patient reported that she attends the [facility] once a week/Clubhouse/[facility] ([facility])/day-hab/treatment program

## 2025-02-28 NOTE — ED BEHAVIORAL HEALTH ASSESSMENT NOTE - ACCOMPANIED BY
Family concerned about patient's mental status and states that he has not been acting "like himself". Daughter states that he tells random stories that don't make sense. Patient is AxO4 and VSS Left leg cool to touch and dusky A&Ox4. VSS. Patient LLE dressing/knee immobilizer saturated with sanguinous drainage. Blue kvng also has moderate amount of sanguinous drainage. Pt is asymptomatic Pt A&Ox4, Pt other VSS. Pt /79. Pt given atenolol this morning. Pt denies headache and blurry vision. A&Ox4. VS per flowsheet. Patient complaining of pain 9/10 in LLE. Patent states L foot feels cold. LLE cooler to touch and L foot/lower leg appears dusky. DP/PT pulses absent. Family member

## 2025-02-28 NOTE — DIETITIAN INITIAL EVALUATION ADULT - ADD RECOMMEND
- Encourage healthy food choices and portion control.  - Monitor PO intake/tolerance, weights, labs, BM's, and skin integrity.

## 2025-02-28 NOTE — DIETITIAN INITIAL EVALUATION ADULT - PERTINENT LABORATORY DATA
02-27    139  |  104  |  6[L]  ----------------------------<  80  4.2   |  23  |  0.68    Ca    9.1      27 Feb 2025 10:32    TPro  7.8  /  Alb  4.1  /  TBili  0.2  /  DBili  x   /  AST  16  /  ALT  17  /  AlkPhos  78  02-27  A1C with Estimated Average Glucose Result: 5.1 % (02-27-25 @ 10:32)  A1C with Estimated Average Glucose Result: 5.0 % (12-16-24 @ 10:45)    Lipid Panel: Date/Time: 02-27-25 @ 10:32  Cholesterol, Serum: 148  LDL Cholesterol Calculated: 86  HDL Cholesterol, Serum: 50  Total Cholesterol/HDL Ration Measurement: --  Triglycerides, Serum: 57

## 2025-02-28 NOTE — PSYCHIATRIC REHAB INITIAL EVALUATION - NSBHPRRECOMMEND_PSY_ALL_CORE
Pt is a 29 y/o female, domiciled with family. Pt has hx of 9 prior psychiatric admissions, with an admitting dx of ASDand Schizophrenia.  Pt reported hx of suicide gesture in her 20’s. Pt was admitted to [facility] due to psychotic symptoms and worsening depression and anxiety. Patient endorsed AH of the derogatory nature, as well as VH of "angels and spirits". Pt denies substance use. Medical records indicate that the patient often misses doses of her medication due to falling asleep before her evening dose.    Writer oriented pt with psychiatric rehabilitation department’s function, unit programming and daily activities. Writer provided pt with a copy of unit schedule and psychiatric rehabilitation welcome letter. Writer encouraged pt to attend daily symptom management groups.        Pt is a 31 y/o female, domiciled with family. Pt has hx of 9 prior psychiatric admissions, with an admitting dx of ASD and Schizophrenia.  Pt reported hx of suicide gesture in her 20’s. Pt was admitted to [facility] due to psychotic symptoms and worsening depression and anxiety. Patient endorsed AH of the derogatory nature, as well as VH of "angels and spirits". Pt denies substance use. Medical records indicate that the patient often misses doses of her medication due to falling asleep before her evening dose.   Writer oriented pt with psychiatric rehabilitation department’s function, unit programming and daily activities. Writer provided pt with a copy of unit schedule and psychiatric rehabilitation welcome letter. Writer encouraged pt to attend daily symptom management groups. Patient was verbal, pleasant, and cooperative. Patient and writer were able to establish a collaborative rehabilitation goal. Psych rehab staff will continue to provide ongoing support and encouragement.

## 2025-02-28 NOTE — DIETITIAN INITIAL EVALUATION ADULT - PERTINENT MEDS FT
MEDICATIONS  (STANDING):  ARIPiprazole 10 milliGRAM(s) Oral once  metFORMIN 500 milliGRAM(s) Oral daily    MEDICATIONS  (PRN):  acetaminophen     Tablet .. 650 milliGRAM(s) Oral every 6 hours PRN Mild Pain (1 - 3), Moderate Pain (4 - 6)  haloperidol     Tablet 5 milliGRAM(s) Oral every 6 hours PRN agitation secondary to psychosis  haloperidol    Injectable 5 milliGRAM(s) IntraMuscular once PRN extreme agitation secondary psychosis  LORazepam     Tablet 2 milliGRAM(s) Oral every 6 hours PRN Anxiety  LORazepam   Injectable 2 milliGRAM(s) IntraMuscular once PRN exreme anxiety  melatonin. 3 milliGRAM(s) Oral at bedtime PRN Insomnia  polyethylene glycol 3350 17 Gram(s) Oral daily PRN constipation

## 2025-02-28 NOTE — DIETITIAN INITIAL EVALUATION ADULT - OTHER INFO
Patient is a 29 y/o female with PPHx ASD w/ schizophrenia, at least 9 previous admissions- last at [facility] 5/9-5/22 2024, multiple ED presentations, no hx of suicide attempts, history of aggression in the past (hitting), no legal issues, no substance use. PMH of obesity, pre-diabetes who in treatment at [facility] AOPD, last seen on 2/3/25, bib today by her mom for worsening of +ah, depression and anxiety.      Met with patient in the dining area today. Patient reports her appetite has been good with no changes to her PO intake PTA. NKFA reported. Not on any ONS at home. Reports current appetite remains good with good PO intake at meals. Denies chewing/swallowing difficulties on current diet. Denies GI distress (nausea/vomiting/diarrhea/ constipation) at this time. +hx of pre-DM, patient has been managing her BG from her diet, has been limiting her sodas/juices/sweets. Current HgbA1C 5.1%. Also discussed healthy, balanced diet for her weight mgmt. Patient verbalized understanding and accepted ADA Lifestyle Tips for BG Control handouts offered.

## 2025-02-28 NOTE — BH INPATIENT PSYCHIATRY PROGRESS NOTE - NSBHFUPINTERVALHXFT_PSY_A_CORE
Chart reviewed and case discussed with interdisciplinary team. No acute events overnight. On interview today patient states that she feels better since admission. States that she is now hearing positive voices from God and now only seeing good angels. States that being lonely/social isolation has been a trigger for her worsening mood. She is agreeable to trying to take abilify 10 mg during the day as she often forgets to take it at night and her mom is at home to help her during the day. States that she thinks missing her doses of abilify also may have triggered her worsening mood recently. Reports enjoying groups on the unit. Denies any SI.

## 2025-02-28 NOTE — BH SOCIAL WORK INITIAL PSYCHOSOCIAL EVALUATION - OTHER PAST PSYCHIATRIC HISTORY (INCLUDE DETAILS REGARDING ONSET, COURSE OF ILLNESS, INPATIENT/OUTPATIENT TREATMENT)
Pt is a 31 yo woman, on SSD, PPHx ASD w/ schizophrenia, at least 9 previous admissions- last at [facility] 5/9-5/22 2024 , multiple ED presentations, no hx of suicide attempts, history of aggression in the past (hitting), no legal issues, no substance use. PMH of obesity, pre-diabetes who in treatment at [facility] AOPD, last seen on 2/3/25, bib today by her mom for worsening of +ah, depression and anxiety. pt presented to the  ED due to increased feelings of depression and anxiety, as well as more frequent auditory hallucinations. She endorses chronic auditory hallucinations, describing the voices saying they are  "bigger and better" than her and making "vain" remarks.  She stated that over the past week, the voices have become louder, and her coping skills are no longer effective.  She attributes her increased depression and anxiety over the last few days to these intensified auditory hallucinations .In addition to auditory hallucinations, she reports chronic visual hallucinations of "dark spirits and angels."  She described having a demonic side that will have to kill the voices with either a knife or chainsaw, but clarified this would be done "spiritually," not with a physical weapon.  She denies command auditory hallucinations. The patient also reported poor sleep over the last few days, attributing it to the voices, and stated she is only sleeping about four hours a night.  Last week, she missed one day of her program due to feeling overwhelmed by the voices. She reports crying more often because of the worsening auditory hallucinations. This AM she called [phone] because she was crying, felt overwhelmed by the voices and didn't know what to do. Patient adamantly denies SI, intent or plan; denies any HI, violent thoughts.     as per Ed colleteral, Dr. Crowe requesting call to ED- He reports patient was in respite recently in [place] x 1 week last month. She was feeling lonely and depressed/AH were bothersome. She has been struggling recently. She recently restarted individual therapy and asked for group therapy.  He stated patient occasionally will miss a dose of Abilify but is otherwise compliant. He is not sure what the precipitating factor is. He does not think there is secondary gain. He stated if patient wants admission that would be fine.      PR[facility] program [phone]    Spoke with Mother (see  note)- Per mother patient takes medication at night because it makes her drowsy in the AM. So she switched to QHS.  She misses medication 3x a week. Mother stated medication has to be crushed and mixed with applesauce. Mother stated she will get home 730p/8p and patient will already be sleeping and miss her dose of Abilify. Trigger may have been patient being bullied in her program by a peer- that peer was reported and was not longer in the program.

## 2025-03-01 PROCEDURE — 99232 SBSQ HOSP IP/OBS MODERATE 35: CPT

## 2025-03-01 RX ADMIN — ARIPIPRAZOLE 10 MILLIGRAM(S): 2 TABLET ORAL at 08:52

## 2025-03-01 RX ADMIN — METFORMIN HYDROCHLORIDE 500 MILLIGRAM(S): 850 TABLET ORAL at 08:53

## 2025-03-01 NOTE — BH INPATIENT PSYCHIATRY PROGRESS NOTE - NSBHASSESSSUMMFT_PSY_ALL_CORE
29 yo woman, on SSD, PPHx ASD w/ schizophrenia, at least 9 previous admissions- last at [facility] 5/9-5/22 2024, multiple ED presentations, no hx of suicide attempts, history of aggression in the past (hitting), no legal issues, no substance use. PMH of obesity, pre-diabetes who in treatment at [facility] AOPD, last seen on 2/3/25, bib today by her mom for worsening of +ah, depression and anxiety.      Plan:  - Continue Haldol decanoate 200mg IM (due 3/3/25)  - Switch Abilify from 10mg QHS to 10 mg QD  - PRN Haldol 5mg PO/IM PRN, Ativan 2mg PO/IM PRN for agitation  - Obtain collateral  - Dispo: coordinate with CALI

## 2025-03-01 NOTE — BH INPATIENT PSYCHIATRY PROGRESS NOTE - NSBHFUPINTERVALHXFT_PSY_A_CORE
Pt compliant with medication and tolerating it well.  Chart reviewed and case discussed with nursing.  No events reported overnight.  Pt denies feeling depressed, just anxious.  Pt denies SI/HI/I/P or AH.  Pt reports VH of "angels in a good way."  Pt denies paranoia.  Pt eating and sleeping well.

## 2025-03-02 PROCEDURE — 99232 SBSQ HOSP IP/OBS MODERATE 35: CPT

## 2025-03-02 RX ADMIN — METFORMIN HYDROCHLORIDE 500 MILLIGRAM(S): 850 TABLET ORAL at 08:37

## 2025-03-02 RX ADMIN — ARIPIPRAZOLE 10 MILLIGRAM(S): 2 TABLET ORAL at 08:37

## 2025-03-02 NOTE — BH INPATIENT PSYCHIATRY PROGRESS NOTE - NSBHMSELANG_PSY_A_CORE
You were seen in the Electrophysiology Clinic today by: Dr Galarza    Plan:     Follow up Visit:  6 months      If you have further questions, please utilize [employer] to contact us.     Your Care Team:    [facility] Cardiology   Telephone Number     Nurse Line  Rebeca Schroeder, RN   Jeni Portillo, PELON John, PELON   [phone]     For scheduling appointments:   [place]   [phone]   For procedure scheduling:    Etelvina Donnelly     [phone]   For the Device Clinic (Pacemakers, ICDs, Loop Recorders)    During business hours: [phone]  After business hours:   [phone]- select option 4 and ask for job code [id number].       On-call cardiologist for after hours or on weekends:   [phone], option #4, and ask to speak to the on-call cardiologist.     Cardiovascular Clinic:   [address] [place] [address]. [place] [address]      As always, Thank you for trusting us with your health care needs!     
No abnormalities noted
No abnormalities noted

## 2025-03-02 NOTE — BH INPATIENT PSYCHIATRY PROGRESS NOTE - NSBHFUPINTERVALHXFT_PSY_A_CORE
Pt compliant with medication and tolerating it well.  Chart reviewed and no events reported overnight.  Pt denies feeling depressed or anxious today, reports her mood is better today.  Pt denies SI/HI/I/P or AH.  Pt reports VH of angels, but reports the VH are less.  Pt denies paranoia.  Pt eating and sleeping well.

## 2025-03-02 NOTE — BH INPATIENT PSYCHIATRY PROGRESS NOTE - NSBHASSESSSUMMFT_PSY_ALL_CORE
31 yo woman, on SSD, PPHx ASD w/ schizophrenia, at least 9 previous admissions- last at [facility] 5/9-5/22 2024, multiple ED presentations, no hx of suicide attempts, history of aggression in the past (hitting), no legal issues, no substance use. PMH of obesity, pre-diabetes who in treatment at [facility] AOPD, last seen on 2/3/25, bib today by her mom for worsening of +ah, depression and anxiety.      Plan:  - Continue Haldol decanoate 200mg IM (due 3/3/25)  - Switch Abilify from 10mg QHS to 10 mg QD  - PRN Haldol 5mg PO/IM PRN, Ativan 2mg PO/IM PRN for agitation  - Obtain collateral  - Dispo: coordinate with CALI

## 2025-03-03 PROCEDURE — 99232 SBSQ HOSP IP/OBS MODERATE 35: CPT

## 2025-03-03 PROCEDURE — 90853 GROUP PSYCHOTHERAPY: CPT

## 2025-03-03 RX ADMIN — ARIPIPRAZOLE 10 MILLIGRAM(S): 2 TABLET ORAL at 09:50

## 2025-03-03 RX ADMIN — METFORMIN HYDROCHLORIDE 500 MILLIGRAM(S): 850 TABLET ORAL at 09:50

## 2025-03-03 RX ADMIN — HALOPERIDOL 200 MILLIGRAM(S): 10 TABLET ORAL at 10:06

## 2025-03-03 NOTE — BH INPATIENT PSYCHIATRY PROGRESS NOTE - NSBHASSESSSUMMFT_PSY_ALL_CORE
31 yo woman, on SSD, PPHx ASD w/ schizophrenia, at least 9 previous admissions- last at [facility] 5/9-5/22 2024, multiple ED presentations, no hx of suicide attempts, history of aggression in the past (hitting), no legal issues, no substance use. PMH of obesity, pre-diabetes who in treatment at [facility] AOPD, last seen on 2/3/25, bib today by her mom for worsening of +ah, depression and anxiety.    3/3: Patient continues to do well. Received haldol decanoate this AM and tolerating abilify 10 mg daily. Mom notes patient seems to be doing better as well. Patient continues to benefit from groups on the unit. Will coordinate with SW to secure aftercare.    Plan:  - Continue Haldol decanoate 200mg IM (received 3/3/25)  - Continue Abilify 10 mg QD  - PRN Haldol 5mg PO/IM PRN, Ativan 2mg PO/IM PRN for agitation  - Obtain collateral  - Dispo: coordinate with SW

## 2025-03-03 NOTE — BH INPATIENT PSYCHIATRY PROGRESS NOTE - NSBHFUPINTERVALHXFT_PSY_A_CORE
Chart reviewed and case discussed with interdisciplinary team. No acute events over the weekend. Pt compliant with medications. On interview reports doing well. Has been enjoying groups. Reports tolerating taking abilify during the day. Reports AH are minimal and positive voices when she hears them. Reports VH of "good people". States that she is doing better. Spoke to mom to obtain collateral and provide updates. Mom notes that she feels patient was doing prior to hospitalization due to bullying by another peer. States that she seems to be doing better now.

## 2025-03-04 PROCEDURE — 99231 SBSQ HOSP IP/OBS SF/LOW 25: CPT

## 2025-03-04 RX ORDER — METFORMIN HYDROCHLORIDE 850 MG/1
1 TABLET ORAL
Refills: 0 | DISCHARGE

## 2025-03-04 RX ORDER — LORAZEPAM 4 MG/ML
2 VIAL (ML) INJECTION ONCE
Refills: 0 | Status: DISCONTINUED | OUTPATIENT
Start: 2025-03-04 | End: 2025-03-06

## 2025-03-04 RX ORDER — LORAZEPAM 4 MG/ML
2 VIAL (ML) INJECTION EVERY 6 HOURS
Refills: 0 | Status: DISCONTINUED | OUTPATIENT
Start: 2025-03-04 | End: 2025-03-06

## 2025-03-04 RX ORDER — METFORMIN HYDROCHLORIDE 850 MG/1
1 TABLET ORAL
Qty: 30 | Refills: 0
Start: 2025-03-04 | End: 2025-04-02

## 2025-03-04 RX ORDER — ARIPIPRAZOLE 2 MG/1
1 TABLET ORAL
Qty: 30 | Refills: 0
Start: 2025-03-04 | End: 2025-04-02

## 2025-03-04 RX ORDER — ARIPIPRAZOLE 2 MG/1
1 TABLET ORAL
Refills: 0 | DISCHARGE

## 2025-03-04 RX ADMIN — METFORMIN HYDROCHLORIDE 500 MILLIGRAM(S): 850 TABLET ORAL at 08:36

## 2025-03-04 RX ADMIN — ARIPIPRAZOLE 10 MILLIGRAM(S): 2 TABLET ORAL at 08:36

## 2025-03-04 NOTE — BH INPATIENT PSYCHIATRY DISCHARGE NOTE - NSDCCPCAREPLAN_GEN_ALL_CORE_FT
PRINCIPAL DISCHARGE DIAGNOSIS  Diagnosis: Schizoaffective disorder  Assessment and Plan of Treatment: Continue with medications as prescribed and follow up with outpatient providers

## 2025-03-04 NOTE — BH INPATIENT PSYCHIATRY DISCHARGE NOTE - OTHER PAST PSYCHIATRIC HISTORY (INCLUDE DETAILS REGARDING ONSET, COURSE OF ILLNESS, INPATIENT/OUTPATIENT TREATMENT)
Pt is a 31 yo woman, on SSD, PPHx ASD w/ schizophrenia, at least 9 previous admissions- last at [facility] 5/9-5/22 2024 , multiple ED presentations, no hx of suicide attempts, history of aggression in the past (hitting), no legal issues, no substance use. PMH of obesity, pre-diabetes who in treatment at [facility] AOPD, last seen on 2/3/25, bib today by her mom for worsening of +ah, depression and anxiety. pt presented to the  ED due to increased feelings of depression and anxiety, as well as more frequent auditory hallucinations. She endorses chronic auditory hallucinations, describing the voices saying they are  "bigger and better" than her and making "vain" remarks.  She stated that over the past week, the voices have become louder, and her coping skills are no longer effective.  She attributes her increased depression and anxiety over the last few days to these intensified auditory hallucinations .In addition to auditory hallucinations, she reports chronic visual hallucinations of "dark spirits and angels."  She described having a demonic side that will have to kill the voices with either a knife or chainsaw, but clarified this would be done "spiritually," not with a physical weapon.  She denies command auditory hallucinations. The patient also reported poor sleep over the last few days, attributing it to the voices, and stated she is only sleeping about four hours a night.  Last week, she missed one day of her program due to feeling overwhelmed by the voices. She reports crying more often because of the worsening auditory hallucinations. This AM she called [phone] because she was crying, felt overwhelmed by the voices and didn't know what to do. Patient adamantly denies SI, intent or plan; denies any HI, violent thoughts.     as per Ed colleteral, Dr. Crowe requesting call to ED- He reports patient was in respite recently in [place] x 1 week last month. She was feeling lonely and depressed/AH were bothersome. She has been struggling recently. She recently restarted individual therapy and asked for group therapy.  He stated patient occasionally will miss a dose of Abilify but is otherwise compliant. He is not sure what the precipitating factor is. He does not think there is secondary gain. He stated if patient wants admission that would be fine.      PR[facility] program [phone]    Spoke with Mother (see  note)- Per mother patient takes medication at night because it makes her drowsy in the AM. So she switched to QHS.  She misses medication 3x a week. Mother stated medication has to be crushed and mixed with applesauce. Mother stated she will get home 730p/8p and patient will already be sleeping and miss her dose of Abilify. Trigger may have been patient being bullied in her program by a peer- that peer was reported and was not longer in the program.

## 2025-03-04 NOTE — BH INPATIENT PSYCHIATRY PROGRESS NOTE - NSBHASSESSSUMMFT_PSY_ALL_CORE
31 yo woman, on SSD, PPHx ASD w/ schizophrenia, at least 9 previous admissions- last at [facility] 5/9-5/22 2024, multiple ED presentations, no hx of suicide attempts, history of aggression in the past (hitting), no legal issues, no substance use. PMH of obesity, pre-diabetes who in treatment at [facility] AOPD, last seen on 2/3/25, bib today by her mom for worsening of +ah, depression and anxiety.    3/3: Patient continues to do well. Received haldol decanoate this AM and tolerating abilify 10 mg daily. Mom notes patient seems to be doing better as well. Patient continues to benefit from groups on the unit. Will coordinate with SW to secure aftercare.    Plan:  - Continue Haldol decanoate 200mg IM (received 3/3/25)  - Continue Abilify 10 mg QD  - PRN Haldol 5mg PO/IM PRN, Ativan 2mg PO/IM PRN for agitation  - Obtain collateral  - Dispo: coordinate with SW   31 yo woman, on SSD, PPHx ASD w/ schizophrenia, at least 9 previous admissions- last at [facility] 5/9-5/22 2024, multiple ED presentations, no hx of suicide attempts, history of aggression in the past (hitting), no legal issues, no substance use. PMH of obesity, pre-diabetes who in treatment at [facility] AOPD, last seen on 2/3/25, bib today by her mom for worsening of +ah, depression and anxiety.    3/4: Patient continues to do well. Received haldol decanoate 3/3 and tolerating abilify 10 mg daily. Coordinating with CALI to secure aftercare.    Plan:  - Continue Haldol decanoate 200mg IM (received 3/3/25)  - Continue Abilify 10 mg QD  - PRN Haldol 5mg PO/IM PRN, Ativan 2mg PO/IM PRN for agitation  - Obtain collateral  - Dispo: coordinate with SW

## 2025-03-04 NOTE — BH INPATIENT PSYCHIATRY DISCHARGE NOTE - NSBHPSYCHMEDOTHERFT_PSY_A_CORE
Patient home regimen. Abilify augmenting haldol dec medication for mood stabilization and psychosis.

## 2025-03-04 NOTE — BH INPATIENT PSYCHIATRY DISCHARGE NOTE - HOSPITAL COURSE
On admission, patient reported feeling more depressed, anxious, hearing more AH, and experiencing more negative VH. Very shortly after admission, patient reported feeling better already. Per outpatient psychiatrist patient had seemed to be struggling more recently possibly related to social isolation. Noted that patient may benefit from taking abilify during the day rather than at bedtime as her mom can help her remember to take the medication.      information"    over the past few days without a clear trigger. Notes that she has missed a few doses of abilify because she fell asleep. Notes that she also has VH of seeing angels which at times bothers here. She denies any SI/HI. Notes that prior to her menstrual cycle in the winter she notices that she tends to experience more depression. Shortly after interview, patient stated that she was already feeling better.     Spoke with Dr. Crowe requesting call to ED- He reports patient was in respite recently in [employer] x 1 week last month. She was feeling lonely and depressed/AH were bothersome. She has been struggling recently. She recently restarted individual therapy and asked for group therapy. She is normally very busy. He stated patient occasionally will miss a dose of Abilify but is otherwise compliant. He is not sure what the precipitating factor is. He does not think there is secondary gain. He stated if patient wants admission that would be fine.     Spoke with Mother (see  note)- Per mother patient takes medication at night because it makes her drowsy in the AM. So she switched to QHS.  She misses medication 3x a week. Mother stated medication has to be crushed and mixed with applesauce. Mother stated she will get home 730p/8p and patient will already be sleeping and miss her dose of Abilify. Trigger may have been patient being bullied in her program by a peer- that peer was reported and was not longer in the program.    Dates of hospitalization: 02/27/2025 - 03/06/2025    Brief HPI:  29 yo woman, on SSD, PPHx ASD w/ schizophrenia, at least 9 previous admissions- last at [facility] 5/9-5/22 2024, multiple ED presentations, no hx of suicide attempts, history of aggression in the past (hitting), no legal issues, no substance use. PMH of obesity, pre-diabetes who in treatment at [facility] AOPD, last seen on 2/3/25, bib by her mom for worsening of +ah, depression and anxiety.    Hospital Course:  On admission, patient reported feeling more depressed, anxious, hearing more negative AH, and experiencing more negative VH recently. She reported that she had missed a few days of her abilify medication because she fell asleep. As far as triggers for recent worsening symptoms, patient identified possible social isolation. Per collateral from patient's mother, she noted that a peer at patient's [employer] program had been bullying patient which may contributed to patient's worsening mood. Very shortly after admission, patient reported feeling much better. Patient was also amenable to switching abilify dosing to daytime so that it may be easier for her to remember to take this medication at home. Patient was able to tolerated abilify 10 mg QD without any increased sedation or other notable side effects. Throughout hospitalization, patient was very engaged in groups and remained in good behavioral control. She reported feeling significantly better compared to admission on day of discharge. She endorsed no suicidality throughout hospitalization and did not engage in any self-injurious and aggressive behaviors. Over time, patient reported reduction in her AH and VH. She also reported that the AH and VH she still experienced were now positive voices/visual hallucinations and no longer bothersome.     Overall, patient progressed well and her symptoms stabilized by time of discharge. Patient was adherent with medications and was able to participate in the modalities of therapy offered in the inpatient setting. She was able to participate in safe disposition planning. By time of discharge, patient was not a danger to self or others, had achieved optimal benefits from hospitalization, and was appropriate for less restrictive level of care.     Risk Assessment:      Discharge medications:  - Continue Haldol decanoate 200mg IM [id number] (last received 3/3/25)  - Continue Abilify 10 mg QD Dates of hospitalization: 02/27/2025 - 03/06/2025    Brief HPI:  31 yo woman, on SSD, PPHx ASD w/ schizophrenia, at least 9 previous admissions- last at [facility] 5/9-5/22 2024, multiple ED presentations, no hx of suicide attempts, history of aggression in the past (hitting), no legal issues, no substance use. PMH of obesity, pre-diabetes who in treatment at [facility] AOPD, last seen on 2/3/25, bib by her mom for worsening of +ah, depression and anxiety.    Hospital Course:  On admission, patient reported feeling more depressed, anxious, hearing more negative AH, and experiencing more negative VH recently. She reported that she had missed a few days of her abilify medication because she fell asleep. As far as triggers for recent worsening symptoms, patient identified possible social isolation. Per collateral from patient's mother, she noted that a peer at patient's [employer] program had been bullying patient which may contributed to patient's worsening mood. Very shortly after admission, patient reported feeling much better. Patient was also amenable to switching abilify dosing to daytime so that it may be easier for her to remember to take this medication at home. Patient was able to tolerated abilify 10 mg QD without any increased sedation or other notable side effects. Throughout hospitalization, patient was very engaged in groups and remained in good behavioral control. She reported feeling significantly better compared to admission on day of discharge. She endorsed no suicidality throughout hospitalization and did not engage in any self-injurious and aggressive behaviors. Over time, patient reported reduction in her AH and VH. She also reported that the AH and VH she still experienced were now positive voices/visual hallucinations and no longer bothersome.     Overall, patient progressed well and her symptoms stabilized by time of discharge. Patient was adherent with medications and was able to participate in the modalities of therapy offered in the inpatient setting. She was able to participate in safe disposition planning. By time of discharge, patient was not a danger to self or others, had achieved optimal benefits from hospitalization, and was appropriate for less restrictive level of care.     Risk Assessment:  Patient is at CHRONIC elevated risk of harm to self and others due to history chronic psychotic disorder and history of prior hospitalizations. ACUTE risk factors on admission included worsening mood and psychosis. PROTECTIVE factors that mitigate this risk on discharge include stabilization of psychiatric symptoms, patient adamantly denying and suicidality through hospitalization, no prior suicide attempts, medication compliance, supportive family, and engagement in treatment. Patient is agreeable to call 911, or go to the nearest ED with any imminent safety concerns. Given the above, patient does not appear to be at acutely increased risk of harm to self and others above chronic elevated risk and is appropriate for discharge.    Discharge medications:  - Continue Haldol decanoate 200mg IM [id number] (last received 3/3/25)  - Continue Abilify 10 mg QD

## 2025-03-04 NOTE — BH INPATIENT PSYCHIATRY DISCHARGE NOTE - NSBHFUPINTERVALHXFT_PSY_A_CORE
Chart reviewed and case discussed with interdisciplinary team. No acute events overnight. Pt compliant with medications. On interview patient states that she is looking forward to discharge today. States she is doing well. Reports AH and VH are now positive and not bothersome. Has not felt oversedated on abilify during the day. Denies any SI. Denies any other acute concerns at this time.
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
## 2025-03-04 NOTE — BH INPATIENT PSYCHIATRY DISCHARGE NOTE - MSE UNSTRUCTURED FT
Appearance: Dressed appropriately.  Attitude / Behavior / relatedness: Calm, cooperative, pleasant  Eye contact: good  Motor: No abnormal movements, no psychomotor slowing or activation.  Speech: Regular rate.  Mood: "very good"  Affect: euthymic   Thought Process: linear, logical   Thought Content: denies SI/HI   Perceptual: +AVH (these are now positive AVH)  Insight: fair  Judgment: fair  Impulse control: good    Cognition: grossly intact  Gait: Intact.   Appearance: Dressed appropriately.  Attitude / Behavior / relatedness: Calm, cooperative, pleasant  Eye contact: good  Motor: No abnormal movements, no psychomotor slowing or activation.  Speech: Regular rate.  Mood: "good"  Affect: euthymic   Thought Process: linear, logical   Thought Content: denies SI/HI   Perceptual: +AVH (these are now positive AVH)  Insight: fair  Judgment: fair  Impulse control: good    Cognition: grossly intact  Gait: Intact.

## 2025-03-04 NOTE — BH INPATIENT PSYCHIATRY DISCHARGE NOTE - NSBHMETABOLIC_PSY_ALL_CORE_FT
BMI: BMI (kg/m2): 40.3 (03-01-25 @ 07:30)  HbA1c: A1C with Estimated Average Glucose Result: 5.1 % (02-27-25 @ 10:32)    Glucose: POCT Blood Glucose.: 88 mg/dL (02-27-25 @ 07:30)    BP: --Vital Signs Last 24 Hrs  T(C): 36.5 (03-04-25 @ 07:03), Max: 36.5 (03-04-25 @ 07:03)  T(F): 97.7 (03-04-25 @ 07:03), Max: 97.7 (03-04-25 @ 07:03)  HR: --  BP: --  BP(mean): --  RR: --  SpO2: --    Orthostatic VS  03-04-25 @ 07:03  Lying BP: --/-- HR: --  Sitting BP: 107/68 HR: 78  Standing BP: 102/65 HR: 101  Site: --  Mode: --  Orthostatic VS  03-03-25 @ 06:58  Lying BP: --/-- HR: --  Sitting BP: 118/75 HR: 73  Standing BP: 111/73 HR: 90  Site: --  Mode: --  Orthostatic VS  03-02-25 @ 20:16  Lying BP: --/-- HR: --  Sitting BP: 116/84 HR: 82  Standing BP: 109/77 HR: 104  Site: --  Mode: --    Lipid Panel: Date/Time: 02-27-25 @ 10:32  Cholesterol, Serum: 148  LDL Cholesterol Calculated: 86  HDL Cholesterol, Serum: 50  Total Cholesterol/HDL Ration Measurement: --  Triglycerides, Serum: 57

## 2025-03-04 NOTE — BH INPATIENT PSYCHIATRY DISCHARGE NOTE - NSBHFUPINTERVALCCFT_PSY_A_CORE
Discharge Progress Note Date and Time: 03-06-25 @ 09:32    "I am feeling good"  Discharge Progress Note Date and Time: 03-06-25 @ 09:30    "I am feeling good"

## 2025-03-04 NOTE — BH INPATIENT PSYCHIATRY DISCHARGE NOTE - MSE OPTIONS
April 21, 2023     Patient: Hollis Mitchell   YOB: 1995   Date of Visit: 4/21/2023       To Whom It May Concern:    Mr. Hollis Mitchell was seen in the office today.  Please excuse him from missing work.           Sincerely,        Ellie Hayward PA-C       Unstructured MSE
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
## 2025-03-04 NOTE — BH INPATIENT PSYCHIATRY DISCHARGE NOTE - REASON FOR ADMISSION
Patient came in for worsening depression, anxiety, auditory hallucinations, and visual hallucinations.

## 2025-03-04 NOTE — BH INPATIENT PSYCHIATRY DISCHARGE NOTE - NSDCMRMEDTOKEN_GEN_ALL_CORE_FT
ARIPiprazole 10 mg oral tablet: 1 tab(s) orally once a day  haloperidol decanoate 100 mg/mL intramuscular solution: 200 milligram(s) intramuscularly every 4 weeks Last received 3/3/25  metFORMIN 500 mg oral tablet: 1 tab(s) orally once a day

## 2025-03-04 NOTE — BH INPATIENT PSYCHIATRY DISCHARGE NOTE - NSBHASSESSSUMMFT_PSY_ALL_CORE
31 yo woman, on SSD, PPHx ASD w/ schizophrenia, at least 9 previous admissions- last at [facility] 5/9-5/22 2024, multiple ED presentations, no hx of suicide attempts, history of aggression in the past (hitting), no legal issues, no substance use. PMH of obesity, pre-diabetes who in treatment at [facility] AOPD, last seen on 2/3/25, bib today by her mom for worsening of +ah, depression and anxiety.    3/6: Patient continues to do well. Received haldol decanoate 3/3 and tolerating abilify 10 mg daily. Plan for discharge today.    Plan:  - Continue Haldol decanoate 200mg IM [id number] (last received 3/3/25)  - Continue Abilify 10 mg QD

## 2025-03-05 PROCEDURE — 90853 GROUP PSYCHOTHERAPY: CPT

## 2025-03-05 PROCEDURE — 99232 SBSQ HOSP IP/OBS MODERATE 35: CPT

## 2025-03-05 RX ADMIN — ARIPIPRAZOLE 10 MILLIGRAM(S): 2 TABLET ORAL at 09:38

## 2025-03-05 RX ADMIN — METFORMIN HYDROCHLORIDE 500 MILLIGRAM(S): 850 TABLET ORAL at 09:40

## 2025-03-05 NOTE — BH INPATIENT PSYCHIATRY PROGRESS NOTE - CURRENT MEDICATION
MEDICATIONS  (STANDING):  metFORMIN 500 milliGRAM(s) Oral daily    MEDICATIONS  (PRN):  acetaminophen     Tablet .. 650 milliGRAM(s) Oral every 6 hours PRN Mild Pain (1 - 3), Moderate Pain (4 - 6)  haloperidol     Tablet 5 milliGRAM(s) Oral every 6 hours PRN agitation secondary to psychosis  haloperidol    Injectable 5 milliGRAM(s) IntraMuscular once PRN extreme agitation secondary psychosis  LORazepam     Tablet 2 milliGRAM(s) Oral every 6 hours PRN Anxiety  LORazepam   Injectable 2 milliGRAM(s) IntraMuscular once PRN exreme anxiety  melatonin. 3 milliGRAM(s) Oral at bedtime PRN Insomnia  polyethylene glycol 3350 17 Gram(s) Oral daily PRN constipation  
MEDICATIONS  (STANDING):  ARIPiprazole 10 milliGRAM(s) Oral daily  metFORMIN 500 milliGRAM(s) Oral daily    MEDICATIONS  (PRN):  acetaminophen     Tablet .. 650 milliGRAM(s) Oral every 6 hours PRN Mild Pain (1 - 3), Moderate Pain (4 - 6)  haloperidol     Tablet 5 milliGRAM(s) Oral every 6 hours PRN agitation secondary to psychosis  haloperidol    Injectable 5 milliGRAM(s) IntraMuscular once PRN extreme agitation secondary psychosis  LORazepam     Tablet 2 milliGRAM(s) Oral every 6 hours PRN Anxiety  LORazepam   Injectable 2 milliGRAM(s) IntraMuscular once PRN exreme anxiety  melatonin. 3 milliGRAM(s) Oral at bedtime PRN Insomnia  polyethylene glycol 3350 17 Gram(s) Oral daily PRN constipation  

## 2025-03-05 NOTE — BH INPATIENT PSYCHIATRY PROGRESS NOTE - NSBHMETABOLIC_PSY_ALL_CORE_FT
BMI: BMI (kg/m2): 40.3 (03-01-25 @ 07:30)  HbA1c: A1C with Estimated Average Glucose Result: 5.1 % (02-27-25 @ 10:32)    Glucose: POCT Blood Glucose.: 88 mg/dL (02-27-25 @ 07:30)    BP: --Vital Signs Last 24 Hrs  T(C): 36.5 (03-04-25 @ 07:03), Max: 36.5 (03-04-25 @ 07:03)  T(F): 97.7 (03-04-25 @ 07:03), Max: 97.7 (03-04-25 @ 07:03)  HR: --  BP: --  BP(mean): --  RR: --  SpO2: --    Orthostatic VS  03-04-25 @ 07:03  Lying BP: --/-- HR: --  Sitting BP: 107/68 HR: 78  Standing BP: 102/65 HR: 101  Site: --  Mode: --  Orthostatic VS  03-03-25 @ 06:58  Lying BP: --/-- HR: --  Sitting BP: 118/75 HR: 73  Standing BP: 111/73 HR: 90  Site: --  Mode: --  Orthostatic VS  03-02-25 @ 20:16  Lying BP: --/-- HR: --  Sitting BP: 116/84 HR: 82  Standing BP: 109/77 HR: 104  Site: --  Mode: --    Lipid Panel: Date/Time: 02-27-25 @ 10:32  Cholesterol, Serum: 148  LDL Cholesterol Calculated: 86  HDL Cholesterol, Serum: 50  Total Cholesterol/HDL Ration Measurement: --  Triglycerides, Serum: 57  
BMI: BMI (kg/m2): 40.3 (03-01-25 @ 07:30)  HbA1c: A1C with Estimated Average Glucose Result: 5.1 % (02-27-25 @ 10:32)    Glucose: POCT Blood Glucose.: 88 mg/dL (02-27-25 @ 07:30)    BP: --Vital Signs Last 24 Hrs  T(C): 36.4 (03-03-25 @ 06:58), Max: 36.4 (03-03-25 @ 06:58)  T(F): 97.5 (03-03-25 @ 06:58), Max: 97.5 (03-03-25 @ 06:58)  HR: --  BP: --  BP(mean): --  RR: --  SpO2: --    Orthostatic VS  03-03-25 @ 06:58  Lying BP: --/-- HR: --  Sitting BP: 118/75 HR: 73  Standing BP: 111/73 HR: 90  Site: --  Mode: --  Orthostatic VS  03-02-25 @ 20:16  Lying BP: --/-- HR: --  Sitting BP: 116/84 HR: 82  Standing BP: 109/77 HR: 104  Site: --  Mode: --  Orthostatic VS  03-02-25 @ 06:36  Lying BP: --/-- HR: --  Sitting BP: 111/73 HR: 77  Standing BP: 112/74 HR: 85  Site: --  Mode: --  Orthostatic VS  03-01-25 @ 20:27  Lying BP: --/-- HR: --  Sitting BP: 115/73 HR: 76  Standing BP: 109/74 HR: 95  Site: --  Mode: --    Lipid Panel: Date/Time: 02-27-25 @ 10:32  Cholesterol, Serum: 148  LDL Cholesterol Calculated: 86  HDL Cholesterol, Serum: 50  Total Cholesterol/HDL Ration Measurement: --  Triglycerides, Serum: 57  
BMI: BMI (kg/m2): 40.5 (02-27-25 @ 14:40)  HbA1c: A1C with Estimated Average Glucose Result: 5.1 % (02-27-25 @ 10:32)    Glucose: POCT Blood Glucose.: 88 mg/dL (02-27-25 @ 07:30)    BP: 99/61 (02-27-25 @ 12:37) (99/61 - 119/82)Vital Signs Last 24 Hrs  T(C): 36.2 (02-28-25 @ 06:57), Max: 36.2 (02-28-25 @ 06:57)  T(F): 97.2 (02-28-25 @ 06:57), Max: 97.2 (02-28-25 @ 06:57)  HR: --  BP: --  BP(mean): --  RR: --  SpO2: --    Orthostatic VS  02-28-25 @ 06:57  Lying BP: --/-- HR: --  Sitting BP: 119/81 HR: 73  Standing BP: 126/88 HR: 81  Site: --  Mode: --  Orthostatic VS  02-27-25 @ 14:40  Lying BP: --/-- HR: --  Sitting BP: 112/69 HR: 98  Standing BP: 103/76 HR: 108  Site: --  Mode: --    Lipid Panel: Date/Time: 02-27-25 @ 10:32  Cholesterol, Serum: 148  LDL Cholesterol Calculated: 86  HDL Cholesterol, Serum: 50  Total Cholesterol/HDL Ration Measurement: --  Triglycerides, Serum: 57  
BMI: BMI (kg/m2): 40.3 (03-01-25 @ 07:30)  HbA1c: A1C with Estimated Average Glucose Result: 5.1 % (02-27-25 @ 10:32)    Glucose: POCT Blood Glucose.: 88 mg/dL (02-27-25 @ 07:30)    BP: --Vital Signs Last 24 Hrs  T(C): --  T(F): --  HR: --  BP: --  BP(mean): --  RR: --  SpO2: --    Orthostatic VS  03-04-25 @ 07:03  Lying BP: --/-- HR: --  Sitting BP: 107/68 HR: 78  Standing BP: 102/65 HR: 101  Site: --  Mode: --    Lipid Panel: Date/Time: 02-27-25 @ 10:32  Cholesterol, Serum: 148  LDL Cholesterol Calculated: 86  HDL Cholesterol, Serum: 50  Total Cholesterol/HDL Ration Measurement: --  Triglycerides, Serum: 57  
BMI: BMI (kg/m2): 40.3 (03-01-25 @ 07:30)  HbA1c: A1C with Estimated Average Glucose Result: 5.1 % (02-27-25 @ 10:32)    Glucose: POCT Blood Glucose.: 88 mg/dL (02-27-25 @ 07:30)    BP: --Vital Signs Last 24 Hrs  T(C): 36.6 (03-02-25 @ 06:36), Max: 36.6 (03-02-25 @ 06:36)  T(F): 97.9 (03-02-25 @ 06:36), Max: 97.9 (03-02-25 @ 06:36)  HR: --  BP: --  BP(mean): --  RR: --  SpO2: --    Orthostatic VS  03-02-25 @ 06:36  Lying BP: --/-- HR: --  Sitting BP: 111/73 HR: 77  Standing BP: 112/74 HR: 85  Site: --  Mode: --  Orthostatic VS  03-01-25 @ 20:27  Lying BP: --/-- HR: --  Sitting BP: 115/73 HR: 76  Standing BP: 109/74 HR: 95  Site: --  Mode: --  Orthostatic VS  03-01-25 @ 07:30  Lying BP: --/-- HR: --  Sitting BP: 103/64 HR: 75  Standing BP: 106/68 HR: 94  Site: --  Mode: electronic  Orthostatic VS  02-28-25 @ 19:30  Lying BP: --/-- HR: --  Sitting BP: 114/75 HR: 75  Standing BP: --/-- HR: --  Site: --  Mode: --    Lipid Panel: Date/Time: 02-27-25 @ 10:32  Cholesterol, Serum: 148  LDL Cholesterol Calculated: 86  HDL Cholesterol, Serum: 50  Total Cholesterol/HDL Ration Measurement: --  Triglycerides, Serum: 57  
BMI: BMI (kg/m2): 40.3 (03-01-25 @ 07:30)  HbA1c: A1C with Estimated Average Glucose Result: 5.1 % (02-27-25 @ 10:32)    Glucose: POCT Blood Glucose.: 88 mg/dL (02-27-25 @ 07:30)    BP: 99/61 (02-27-25 @ 12:37) (99/61 - 119/82)Vital Signs Last 24 Hrs  T(C): 36.5 (03-01-25 @ 07:30), Max: 36.5 (03-01-25 @ 07:30)  T(F): 97.7 (03-01-25 @ 07:30), Max: 97.7 (03-01-25 @ 07:30)  HR: --  BP: --  BP(mean): --  RR: --  SpO2: --    Orthostatic VS  03-01-25 @ 07:30  Lying BP: --/-- HR: --  Sitting BP: 103/64 HR: 75  Standing BP: 106/68 HR: 94  Site: --  Mode: electronic  Orthostatic VS  02-28-25 @ 19:30  Lying BP: --/-- HR: --  Sitting BP: 114/75 HR: 75  Standing BP: --/-- HR: --  Site: --  Mode: --  Orthostatic VS  02-28-25 @ 06:57  Lying BP: --/-- HR: --  Sitting BP: 119/81 HR: 73  Standing BP: 126/88 HR: 81  Site: --  Mode: --  Orthostatic VS  02-27-25 @ 14:40  Lying BP: --/-- HR: --  Sitting BP: 112/69 HR: 98  Standing BP: 103/76 HR: 108  Site: --  Mode: --    Lipid Panel: Date/Time: 02-27-25 @ 10:32  Cholesterol, Serum: 148  LDL Cholesterol Calculated: 86  HDL Cholesterol, Serum: 50  Total Cholesterol/HDL Ration Measurement: --  Triglycerides, Serum: 57

## 2025-03-05 NOTE — BH INPATIENT PSYCHIATRY PROGRESS NOTE - NSBHLEGALSTATUSCHANGE_PSY_ALL_CORE
No
Age: 83 years old or older/Coagulation: Bleeding disorder either through use of anticoagulants or underlying clinical condition(s)
No

## 2025-03-05 NOTE — BH INPATIENT PSYCHIATRY PROGRESS NOTE - NSTXDEPRESINTERMD_PSY_ALL_CORE
Medications + therapy

## 2025-03-05 NOTE — BH INPATIENT PSYCHIATRY PROGRESS NOTE - NSBHFUPINTERVALHXFT_PSY_A_CORE
Chart reviewed and case discussed with interdisciplinary team. No acute events overnight. Pt compliant with medications. On interview patient states that she is doing really well. States she only hears positive AH and sees positive VH. She denies any acute concerns at this time. Denies any SI. Looking forward to discharging home soon.

## 2025-03-05 NOTE — BH INPATIENT PSYCHIATRY PROGRESS NOTE - NSTXANXGOAL_PSY_ALL_CORE
Be able to participate in activities despite lingering anxiety/panic
No difficulties
Be able to participate in activities despite lingering anxiety/panic

## 2025-03-05 NOTE — BH INPATIENT PSYCHIATRY PROGRESS NOTE - NSTXPROBDCSOC_PSY_ALL_CORE
DISCHARGE ISSUE - POOR SOCIALIZATION IN COMMUNITY

## 2025-03-05 NOTE — BH INPATIENT PSYCHIATRY PROGRESS NOTE - NSTXPSYCHOINTERMD_PSY_ALL_CORE
Medications + therapy
Medications + therapy
No
Medications + therapy

## 2025-03-05 NOTE — BH SAFETY PLAN - ASK FOR HELP NAME 1
Medication PA Requested: Tirzepatide (MOUNJARO) 15 MG/0.5ML Subcutaneous Solution Pen-injector                                        CoverMyMeds Used: No  Key:  Quantity: 6 ml  Day Supply: 90  Sig: Inject 15 mg into the skin once a week.   DX Code:    E11.69         Electronic prior authorization submitted, last office visit 4/22 and A1C 7/23  Awaiting determination   My facilitator from my program My therapist/ my psychiatrist

## 2025-03-05 NOTE — BH INPATIENT PSYCHIATRY PROGRESS NOTE - NSBHCONSBHPROVDETAILS_PSY_A_CORE  FT
Spoke to outpatient psychiatrist. Notes that patient is due for haldol dec 200 mg on 2/3. States that patient has been missing doses of abilify PO and may benefit from taking medication in the day when her mom is home. Wonders whether there may be some environmental stressors that could be contributing to patient's worsening mood recently as well. 

## 2025-03-05 NOTE — BH INPATIENT PSYCHIATRY PROGRESS NOTE - NSBHFUPINTERVALCCFT_PSY_A_CORE
Pt seen f/u mood/psychosis
f/u mood/psychosis
Pt seen f/u mood/psychosis
Pt seen f/u mood/psychosis

## 2025-03-05 NOTE — BH INPATIENT PSYCHIATRY PROGRESS NOTE - NSBHCHARTREVIEWVS_PSY_A_CORE FT
Vital Signs Last 24 Hrs  T(C): --  T(F): --  HR: --  BP: --  BP(mean): --  RR: --  SpO2: --    Orthostatic VS  03-04-25 @ 07:03  Lying BP: --/-- HR: --  Sitting BP: 107/68 HR: 78  Standing BP: 102/65 HR: 101  Site: --  Mode: --  
Vital Signs Last 24 Hrs  T(C): 36.5 (03-04-25 @ 07:03), Max: 36.5 (03-04-25 @ 07:03)  T(F): 97.7 (03-04-25 @ 07:03), Max: 97.7 (03-04-25 @ 07:03)  HR: --  BP: --  BP(mean): --  RR: --  SpO2: --    Orthostatic VS  03-04-25 @ 07:03  Lying BP: --/-- HR: --  Sitting BP: 107/68 HR: 78  Standing BP: 102/65 HR: 101  Site: --  Mode: --  Orthostatic VS  03-03-25 @ 06:58  Lying BP: --/-- HR: --  Sitting BP: 118/75 HR: 73  Standing BP: 111/73 HR: 90  Site: --  Mode: --  Orthostatic VS  03-02-25 @ 20:16  Lying BP: --/-- HR: --  Sitting BP: 116/84 HR: 82  Standing BP: 109/77 HR: 104  Site: --  Mode: --  
Vital Signs Last 24 Hrs  T(C): 36.5 (03-01-25 @ 07:30), Max: 36.5 (03-01-25 @ 07:30)  T(F): 97.7 (03-01-25 @ 07:30), Max: 97.7 (03-01-25 @ 07:30)  HR: --  BP: --  BP(mean): --  RR: --  SpO2: --    Orthostatic VS  03-01-25 @ 07:30  Lying BP: --/-- HR: --  Sitting BP: 103/64 HR: 75  Standing BP: 106/68 HR: 94  Site: --  Mode: electronic  Orthostatic VS  02-28-25 @ 19:30  Lying BP: --/-- HR: --  Sitting BP: 114/75 HR: 75  Standing BP: --/-- HR: --  Site: --  Mode: --  Orthostatic VS  02-28-25 @ 06:57  Lying BP: --/-- HR: --  Sitting BP: 119/81 HR: 73  Standing BP: 126/88 HR: 81  Site: --  Mode: --  Orthostatic VS  02-27-25 @ 14:40  Lying BP: --/-- HR: --  Sitting BP: 112/69 HR: 98  Standing BP: 103/76 HR: 108  Site: --  Mode: --  
Vital Signs Last 24 Hrs  T(C): 36.4 (03-03-25 @ 06:58), Max: 36.4 (03-03-25 @ 06:58)  T(F): 97.5 (03-03-25 @ 06:58), Max: 97.5 (03-03-25 @ 06:58)  HR: --  BP: --  BP(mean): --  RR: --  SpO2: --    Orthostatic VS  03-03-25 @ 06:58  Lying BP: --/-- HR: --  Sitting BP: 118/75 HR: 73  Standing BP: 111/73 HR: 90  Site: --  Mode: --  Orthostatic VS  03-02-25 @ 20:16  Lying BP: --/-- HR: --  Sitting BP: 116/84 HR: 82  Standing BP: 109/77 HR: 104  Site: --  Mode: --  Orthostatic VS  03-02-25 @ 06:36  Lying BP: --/-- HR: --  Sitting BP: 111/73 HR: 77  Standing BP: 112/74 HR: 85  Site: --  Mode: --  Orthostatic VS  03-01-25 @ 20:27  Lying BP: --/-- HR: --  Sitting BP: 115/73 HR: 76  Standing BP: 109/74 HR: 95  Site: --  Mode: --  
Vital Signs Last 24 Hrs  T(C): 36.2 (02-28-25 @ 06:57), Max: 36.2 (02-28-25 @ 06:57)  T(F): 97.2 (02-28-25 @ 06:57), Max: 97.2 (02-28-25 @ 06:57)  HR: --  BP: --  BP(mean): --  RR: --  SpO2: --    Orthostatic VS  02-28-25 @ 06:57  Lying BP: --/-- HR: --  Sitting BP: 119/81 HR: 73  Standing BP: 126/88 HR: 81  Site: --  Mode: --  Orthostatic VS  02-27-25 @ 14:40  Lying BP: --/-- HR: --  Sitting BP: 112/69 HR: 98  Standing BP: 103/76 HR: 108  Site: --  Mode: --  
Vital Signs Last 24 Hrs  T(C): 36.6 (03-02-25 @ 06:36), Max: 36.6 (03-02-25 @ 06:36)  T(F): 97.9 (03-02-25 @ 06:36), Max: 97.9 (03-02-25 @ 06:36)  HR: --  BP: --  BP(mean): --  RR: --  SpO2: --    Orthostatic VS  03-02-25 @ 06:36  Lying BP: --/-- HR: --  Sitting BP: 111/73 HR: 77  Standing BP: 112/74 HR: 85  Site: --  Mode: --  Orthostatic VS  03-01-25 @ 20:27  Lying BP: --/-- HR: --  Sitting BP: 115/73 HR: 76  Standing BP: 109/74 HR: 95  Site: --  Mode: --  Orthostatic VS  03-01-25 @ 07:30  Lying BP: --/-- HR: --  Sitting BP: 103/64 HR: 75  Standing BP: 106/68 HR: 94  Site: --  Mode: electronic  Orthostatic VS  02-28-25 @ 19:30  Lying BP: --/-- HR: --  Sitting BP: 114/75 HR: 75  Standing BP: --/-- HR: --  Site: --  Mode: --
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
## 2025-03-05 NOTE — BH INPATIENT PSYCHIATRY PROGRESS NOTE - NSTXDEPRESGOAL_PSY_ALL_CORE
Exhibit improvements in self-grooming, hygiene, sleep and appetite

## 2025-03-05 NOTE — BH INPATIENT PSYCHIATRY PROGRESS NOTE - NSTXDCSOCGOAL_PSY_ALL_CORE
Will accept referrals to support groups, clubhouse, senior centers, etc.

## 2025-03-05 NOTE — BH INPATIENT PSYCHIATRY PROGRESS NOTE - NSBHATTESTBILLING_PSY_A_CORE
68247-Ttjljoxzwo OBS or IP - moderate complexity OR 35-49 mins
84135-Nnwkhmpoci OBS or IP - moderate complexity OR 35-49 mins
90013-Tghtfkvrff OBS or IP - moderate complexity OR 35-49 mins
00594-Dbhivxghbl OBS or IP - low complexity OR 25-34 mins
05868-Mqdwyefgik OBS or IP - moderate complexity OR 35-49 mins
39222-Jofifhveaa OBS or IP - moderate complexity OR 35-49 mins

## 2025-03-05 NOTE — BH INPATIENT PSYCHIATRY PROGRESS NOTE - NSBHATTESTTYPEVISIT_PSY_A_CORE
Attending Only
HEATHER without on-site Attending supervision
Attending Only
Attending Only
HEATHER without on-site Attending supervision
Attending Only

## 2025-03-05 NOTE — BH INPATIENT PSYCHIATRY PROGRESS NOTE - PRN MEDS
MEDICATIONS  (PRN):  acetaminophen     Tablet .. 650 milliGRAM(s) Oral every 6 hours PRN Mild Pain (1 - 3), Moderate Pain (4 - 6)  haloperidol     Tablet 5 milliGRAM(s) Oral every 6 hours PRN agitation secondary to psychosis  haloperidol    Injectable 5 milliGRAM(s) IntraMuscular once PRN extreme agitation secondary psychosis  LORazepam     Tablet 2 milliGRAM(s) Oral every 6 hours PRN Anxiety  LORazepam   Injectable 2 milliGRAM(s) IntraMuscular once PRN exreme anxiety  melatonin. 3 milliGRAM(s) Oral at bedtime PRN Insomnia  polyethylene glycol 3350 17 Gram(s) Oral daily PRN constipation  

## 2025-03-05 NOTE — BH INPATIENT PSYCHIATRY PROGRESS NOTE - NSDCCRITERIA_PSY_ALL_CORE
When pt is no longer an acute or imminent risk of harm to self or others, and is able to care for self safely, pt may then be discharged.  

## 2025-03-06 VITALS — TEMPERATURE: 98 F

## 2025-03-06 PROCEDURE — 90853 GROUP PSYCHOTHERAPY: CPT

## 2025-03-06 RX ADMIN — METFORMIN HYDROCHLORIDE 500 MILLIGRAM(S): 850 TABLET ORAL at 08:44

## 2025-03-06 RX ADMIN — ARIPIPRAZOLE 10 MILLIGRAM(S): 2 TABLET ORAL at 08:44

## 2025-03-06 NOTE — BH PSYCHOLOGY - GROUP THERAPY NOTE - NSBHPSYCHOLGRPDUR_PSY_A_CORE
ANTICOAGULATION MANAGEMENT     Patient Name:  Rajani Guo  Date:  3/22/2021    ASSESSMENT /SUBJECTIVE:    Today's INR result of 4.0 is supratherapeutic. Goal INR of 2.5-3.5      Warfarin dose taken: Warfarin taken as instructed    Diet: No new diet changes affecting INR    Medication changes/ interactions: No new medications/supplements affecting INR, continues on amiodarone, no dose changes     Previous INR: Therapeutic 2.80    S/S of bleeding or thromboembolism: No    New injury or illness: Yes: swelling in ankles and feet     Upcoming surgery, procedure or cardioversion: No    Additional findings: None      PLAN:    Telephone call with home care nurse Nora regarding INR result and instructed:     Warfarin Dosing Instructions: Change your warfarin dose to 6mg Sun,Wed,Fri; 4mg all other days  . (6 % change)    Instructed patient to follow up no later than: 3 days  Orders given to  Homecare nurse/facility to recheck    Education provided:, None required      Nora verbalizes understanding and agrees to warfarin dosing plan.    Instructed to call the Anticoagulation Clinic for any changes, questions or concerns. (#[phone])        Isabel Vazquez RN CACP       OBJECTIVE:  Recent labs: (last 7 days)     21   INR 2.80* 4.0*         Anticoagulation Summary  As of 3/22/2021    INR goal:  2.5-3.5   TTR:  17.9 % (2.2 mo)   INR used for dosin.0 (3/22/2021)   Warfarin maintenance plan:  6 mg (2 mg x 3) every Sun, Wed, Fri; 4 mg (2 mg x 2) all other days   Full warfarin instructions:  6 mg every Sun, Wed, Fri; 4 mg all other days   Weekly warfarin total:  34 mg   Plan last modified:  Isabel Vazquez RN (3/22/2021)   Next INR check:  3/25/2021   Priority:  High   Target end date:  Indefinite    Indications    S/P MVR (mitral valve repair) [Z98.890]  S/P AVR (aortic valve replacement) [Z95.2]             Anticoagulation Episode Summary     INR check location:      Preferred lab:      Send 
INR reminders to:  GURVINDER DARDEN    Comments:  MVR & AVR placed 12/31/2020. RPH manage until 03/31/2021      Anticoagulation Care Providers     Provider Role Specialty Phone number    Quinton Handy PA Referring Cardiovascular & Thoracic Surgery [phone]    Lakeshia Rubio, APRN CNP Referring Internal Medicine [phone]    Saprkle Bird APRN CNP Referring Emergency Medicine [phone]             
45 minutes

## 2025-03-06 NOTE — BH DISCHARGE NOTE NURSING/SOCIAL WORK/PSYCH REHAB - NSDCPRGOAL_PSY_ALL_CORE
Over the course of the current hospitalization, Psychiatric Rehabilitation Staff and patient discussed level of functioning, addressed concerns surrounding the hospitalization, discharge, engaged in skill development and safety planning. Patient maintained good behavioral control throughout the duration of her stay. Patient was engaged and receptive to treatment. Patient met specified goal of identifying 2 coping skills that help mitigate hallucinations. Progress towards goal was evidenced by patient’s ability to identify listening to music, socializing, and journaling as coping skills that help her. Throughout the course of the patient’s current hospitalization, the patient attended many of the Psychiatric Rehabilitation group programs and was engaged with meaningful participation. Patient completed a safety plan upon discharge. A copy of the safety plan was given upon discharge for reference.

## 2025-03-06 NOTE — BH PSYCHOLOGY - GROUP THERAPY NOTE - TOKEN PULL-DIAGNOSIS
Primary Diagnosis:  Schizophrenia [F20.9]        Problem Dx:   

## 2025-03-06 NOTE — BH DISCHARGE NOTE NURSING/SOCIAL WORK/PSYCH REHAB - NSCDUDCCRISIS_PSY_A_CORE
[facility] Well  1 (134) [facility]-WELL ([phone])  Text "WELL" to 79871  Website: www.[place]/.National Suicide Prevention Lifeline 6 (427) 198-7632/.  [employer]  1 (864) LIF[employer] (499-6374)/.  [facility]  [address] [id number] [phone]   Hours:  Monday through Friday from 9 AM to 3 PM/988 Suicide and Crisis Lifeline

## 2025-03-06 NOTE — BH DISCHARGE NOTE NURSING/SOCIAL WORK/PSYCH REHAB - DISCHARGE INSTRUCTIONS AFTERCARE APPOINTMENTS
In order to check the location, date, or time of your aftercare appointment, please refer to your Discharge Instructions Document given to you upon leaving the hospital.  If you have lost the instructions please call [phone]

## 2025-03-06 NOTE — BH PSYCHOLOGY - GROUP THERAPY NOTE - NSPSYCHOLGRPBILLING_PSY_A_CORE
[id number] - Group Psychotherapy
08841 - Group Psychotherapy
[id number] - Group Psychotherapy
[id number] - Group Psychotherapy

## 2025-03-06 NOTE — BH PSYCHOLOGY - GROUP THERAPY NOTE - NSPSYCHOLGRPGENPT_PSY_A_CORE FT
Patient attended the cognitive behavior therapy group session. Group focused on topics including identifying stress, understanding the physical reactions to stress, and learning adaptive coping methods. The focus of learning how to manage stress rather than its elimination was also reviewed. Group expectations and guidelines, as well as confidentiality and its limitations, were addressed. Principles of cognitive behavior therapy related to today's group topic were reviewed and discussed. Group members illustrated understanding of these concepts by giving personal examples based on their current experiences. Discussions stemmed from the group topics to the causal connection between thoughts, feelings, and behaviors.
Patient attended the cognitive behavior therapy group session. Group session focused on the topic of problem solving.  Discussion revolved around the identification of problems, exploring the maximization of benefits while reducing the cost of negative consequences as well as tangible strategies to solving problems.  Group expectations and guidelines, as well as confidentiality and its limitations, were addressed. Principles of cognitive behavior therapy related to today's group topic were reviewed and discussed. Group members illustrated understanding of these concepts by giving personal examples based on their current experiences. Discussions stemmed from the group topics to the causal connection between thoughts, feelings, and behaviors.
Patient attended the cognitive behavior therapy group session. Group focused on topics including depression. Specifically, group addressed symptoms of depression, causes of depression, and what can be done to combat depression. Normal depression (sadness) and major depression were distinguished between each other, describing its impact on individuals in different ways. Group expectations and guidelines, as well as confidentiality and its limitations, were addressed. Principles of cognitive behavior therapy related to today's group topic were reviewed and discussed. Group members illustrated understanding of these concepts by giving personal examples based on their current experiences. Discussions stemmed from the group topics to the causal connection between thoughts, feelings, and behaviors.
Patient attended the cognitive behavior therapy (CBT) group session.  Group focused on the topic of depression including the relationship between mood and its impact on everyday life events.  Patient was encouraged to list social interactions as well as write down useful and pleasant activities to assist in replacing negative thoughts that may have hindered progress in the past.  Group expectations and guidelines (as well as confidentiality and its limitations) were addressed.  Principles of cognitive behavior therapy related to today's group topic were reviewed.  Group members illustrated understanding of these concepts by giving personal examples based on their current experiences.  Discussions stemmed from the group topics to the causal connection between thoughts, feelings, and behaviors.

## 2025-03-06 NOTE — BH DISCHARGE NOTE NURSING/SOCIAL WORK/PSYCH REHAB - PATIENT PORTAL LINK FT
You can access the FollowMyHealth Patient Portal offered by [facility] by registering at the following website: [email or web address]. By joining [facility]’s FollowMyHealth portal, you will also be able to view your health information using other applications (apps) compatible with our system.

## 2025-03-06 NOTE — BH DISCHARGE NOTE NURSING/SOCIAL WORK/PSYCH REHAB - NSDCPRRECOMMEND_PSY_ALL_CORE
Psychiatric Rehabilitation staff recommends that patient continue to explore and utilize coping skills for improved symptom management and sustained recovery. Psychiatric Rehabilitation staff recommends patient follow up with outpatient treatment for continued medication management, support and psychotherapy.

## 2025-03-06 NOTE — BH PSYCHOLOGY - GROUP THERAPY NOTE - NSBHPSYCHOLRESPONSE_PSY_A_CORE
Symptoms reduced/Coping skills acquired/Accepted support
Never smoker

## 2025-03-10 PROCEDURE — 99214 OFFICE O/P EST MOD 30 MIN: CPT

## 2025-03-10 PROCEDURE — 90833 PSYTX W PT W E/M 30 MIN: CPT

## 2025-03-17 PROCEDURE — 90834 PSYTX W PT 45 MINUTES: CPT | Mod: 93

## 2025-03-24 PROCEDURE — 90832 PSYTX W PT 30 MINUTES: CPT | Mod: 93

## 2025-03-31 PROCEDURE — 99214 OFFICE O/P EST MOD 30 MIN: CPT

## 2025-03-31 PROCEDURE — 90833 PSYTX W PT W E/M 30 MIN: CPT

## 2025-03-31 PROCEDURE — 90832 PSYTX W PT 30 MINUTES: CPT | Mod: 93

## 2025-04-10 ENCOUNTER — EMERGENCY (EMERGENCY)
Facility: HOSPITAL | Age: 30
LOS: 1 days | End: 2025-04-10
Attending: STUDENT IN AN ORGANIZED HEALTH CARE EDUCATION/TRAINING PROGRAM | Admitting: STUDENT IN AN ORGANIZED HEALTH CARE EDUCATION/TRAINING PROGRAM
Payer: MEDICAID

## 2025-04-10 VITALS
WEIGHT: 229.94 LBS | HEART RATE: 80 BPM | SYSTOLIC BLOOD PRESSURE: 98 MMHG | DIASTOLIC BLOOD PRESSURE: 67 MMHG | TEMPERATURE: 98 F | RESPIRATION RATE: 16 BRPM | OXYGEN SATURATION: 98 % | HEIGHT: 64 IN

## 2025-04-10 PROBLEM — R73.03 PREDIABETES: Chronic | Status: ACTIVE | Noted: 2025-02-27

## 2025-04-10 PROCEDURE — 99284 EMERGENCY DEPT VISIT MOD MDM: CPT

## 2025-04-10 RX ORDER — AMOXICILLIN 500 MG/1
1 CAPSULE ORAL
Qty: 14 | Refills: 0
Start: 2025-04-10 | End: 2025-04-16

## 2025-04-15 ENCOUNTER — APPOINTMENT (OUTPATIENT)
Age: 30
End: 2025-04-15

## 2025-04-19 NOTE — H&P ADULT - PROBLEM SELECTOR PLAN 1
To get better and follow your care plan as instructed.
--presumed R patella dislocation. Unclear from history if analgesic use or knee immobilizer use has been consistent. Regardless, she remains mostly bedbound and unable to perform ADLs. Mother having difficulty caring for her and assisting with transfers.   --Ortho consulted by ED, follow up recommendations. Might there be benefit of evaluating knee with MRI for alternative diagnosis?  --will start standing NSAIDs with tylenol PRN. For severe pain, can use low dose oxycodone PRN.  --PT consult, likely to need [facility] admission.

## 2025-05-08 ENCOUNTER — NON-APPOINTMENT (OUTPATIENT)
Age: 30
End: 2025-05-08

## 2025-05-28 PROCEDURE — 90833 PSYTX W PT W E/M 30 MIN: CPT

## 2025-05-28 PROCEDURE — 99214 OFFICE O/P EST MOD 30 MIN: CPT

## 2025-06-02 PROCEDURE — 90832 PSYTX W PT 30 MINUTES: CPT | Mod: 93

## 2025-06-02 NOTE — BH INPATIENT PSYCHIATRY DISCHARGE NOTE - POSTFACE STATEMENT FOR MINUTES SPENT
[Well developed] : well developed [Dysmorphic] : no dysmorphic [Labored breathing] : non- labored breathing [TextBox_92] : circumcised with no adhesions. I do not see a cyst on the ventrum at all minutes on the discharge service.

## 2025-06-06 ENCOUNTER — INPATIENT (INPATIENT)
Facility: HOSPITAL | Age: 30
LOS: 9 days | Discharge: HOME | End: 2025-06-16
Attending: PSYCHIATRY & NEUROLOGY | Admitting: PSYCHIATRY & NEUROLOGY
Payer: MEDICAID

## 2025-06-06 VITALS
HEART RATE: 82 BPM | OXYGEN SATURATION: 96 % | WEIGHT: 229.94 LBS | DIASTOLIC BLOOD PRESSURE: 87 MMHG | SYSTOLIC BLOOD PRESSURE: 126 MMHG | HEIGHT: 64 IN | RESPIRATION RATE: 16 BRPM | TEMPERATURE: 98 F

## 2025-06-06 DIAGNOSIS — F29 UNSPECIFIED PSYCHOSIS NOT DUE TO A SUBSTANCE OR KNOWN PHYSIOLOGICAL CONDITION: ICD-10-CM

## 2025-06-06 LAB
ADD ON TEST-SPECIMEN IN LAB: SIGNIFICANT CHANGE UP
ALBUMIN SERPL ELPH-MCNC: 4.3 G/DL — SIGNIFICANT CHANGE UP (ref 3.3–5)
ALP SERPL-CCNC: 89 U/L — SIGNIFICANT CHANGE UP (ref 40–120)
ALT FLD-CCNC: 13 U/L — SIGNIFICANT CHANGE UP (ref 4–33)
ANION GAP SERPL CALC-SCNC: 10 MMOL/L — SIGNIFICANT CHANGE UP (ref 7–14)
APAP SERPL-MCNC: <10 UG/ML — LOW (ref 15–25)
AST SERPL-CCNC: 15 U/L — SIGNIFICANT CHANGE UP (ref 4–32)
BASOPHILS # BLD AUTO: 0.03 K/UL — SIGNIFICANT CHANGE UP (ref 0–0.2)
BASOPHILS NFR BLD AUTO: 0.5 % — SIGNIFICANT CHANGE UP (ref 0–2)
BILIRUB SERPL-MCNC: 0.2 MG/DL — SIGNIFICANT CHANGE UP (ref 0.2–1.2)
BUN SERPL-MCNC: 6 MG/DL — LOW (ref 7–23)
CALCIUM SERPL-MCNC: 9.4 MG/DL — SIGNIFICANT CHANGE UP (ref 8.4–10.5)
CHLORIDE SERPL-SCNC: 103 MMOL/L — SIGNIFICANT CHANGE UP (ref 98–107)
CO2 SERPL-SCNC: 24 MMOL/L — SIGNIFICANT CHANGE UP (ref 22–31)
CREAT SERPL-MCNC: 0.72 MG/DL — SIGNIFICANT CHANGE UP (ref 0.5–1.3)
EGFR: 114 ML/MIN/1.73M2 — SIGNIFICANT CHANGE UP
EGFR: 114 ML/MIN/1.73M2 — SIGNIFICANT CHANGE UP
EOSINOPHIL # BLD AUTO: 0.11 K/UL — SIGNIFICANT CHANGE UP (ref 0–0.5)
EOSINOPHIL NFR BLD AUTO: 1.7 % — SIGNIFICANT CHANGE UP (ref 0–6)
ETHANOL SERPL-MCNC: <10 MG/DL — SIGNIFICANT CHANGE UP
GLUCOSE SERPL-MCNC: 87 MG/DL — SIGNIFICANT CHANGE UP (ref 70–99)
HCT VFR BLD CALC: 37 % — SIGNIFICANT CHANGE UP (ref 34.5–45)
HGB BLD-MCNC: 11.7 G/DL — SIGNIFICANT CHANGE UP (ref 11.5–15.5)
IANC: 3.63 K/UL — SIGNIFICANT CHANGE UP (ref 1.8–7.4)
IMM GRANULOCYTES NFR BLD AUTO: 0.5 % — SIGNIFICANT CHANGE UP (ref 0–0.9)
LYMPHOCYTES # BLD AUTO: 2.42 K/UL — SIGNIFICANT CHANGE UP (ref 1–3.3)
LYMPHOCYTES # BLD AUTO: 36.3 % — SIGNIFICANT CHANGE UP (ref 13–44)
MCHC RBC-ENTMCNC: 28.1 PG — SIGNIFICANT CHANGE UP (ref 27–34)
MCHC RBC-ENTMCNC: 31.6 G/DL — LOW (ref 32–36)
MCV RBC AUTO: 88.7 FL — SIGNIFICANT CHANGE UP (ref 80–100)
MONOCYTES # BLD AUTO: 0.44 K/UL — SIGNIFICANT CHANGE UP (ref 0–0.9)
MONOCYTES NFR BLD AUTO: 6.6 % — SIGNIFICANT CHANGE UP (ref 2–14)
NEUTROPHILS # BLD AUTO: 3.63 K/UL — SIGNIFICANT CHANGE UP (ref 1.8–7.4)
NEUTROPHILS NFR BLD AUTO: 54.4 % — SIGNIFICANT CHANGE UP (ref 43–77)
NRBC # BLD AUTO: 0 K/UL — SIGNIFICANT CHANGE UP (ref 0–0)
NRBC # FLD: 0 K/UL — SIGNIFICANT CHANGE UP (ref 0–0)
NRBC BLD AUTO-RTO: 0 /100 WBCS — SIGNIFICANT CHANGE UP (ref 0–0)
PLATELET # BLD AUTO: 405 K/UL — HIGH (ref 150–400)
POTASSIUM SERPL-MCNC: 4.4 MMOL/L — SIGNIFICANT CHANGE UP (ref 3.5–5.3)
POTASSIUM SERPL-SCNC: 4.4 MMOL/L — SIGNIFICANT CHANGE UP (ref 3.5–5.3)
PROT SERPL-MCNC: 8.5 G/DL — HIGH (ref 6–8.3)
RBC # BLD: 4.17 M/UL — SIGNIFICANT CHANGE UP (ref 3.8–5.2)
RBC # FLD: 16.6 % — HIGH (ref 10.3–14.5)
SALICYLATES SERPL-MCNC: <0.3 MG/DL — LOW (ref 15–30)
SARS-COV-2 RNA SPEC QL NAA+PROBE: SIGNIFICANT CHANGE UP
SODIUM SERPL-SCNC: 137 MMOL/L — SIGNIFICANT CHANGE UP (ref 135–145)
TOXICOLOGY SCREEN, DRUGS OF ABUSE, SERUM RESULT: SIGNIFICANT CHANGE UP
TSH SERPL-MCNC: 1.07 UIU/ML — SIGNIFICANT CHANGE UP (ref 0.27–4.2)
WBC # BLD: 6.66 K/UL — SIGNIFICANT CHANGE UP (ref 3.8–10.5)
WBC # FLD AUTO: 6.66 K/UL — SIGNIFICANT CHANGE UP (ref 3.8–10.5)

## 2025-06-06 PROCEDURE — 99285 EMERGENCY DEPT VISIT HI MDM: CPT | Mod: GC

## 2025-06-06 PROCEDURE — 99285 EMERGENCY DEPT VISIT HI MDM: CPT

## 2025-06-06 RX ORDER — LORAZEPAM 4 MG/ML
2 VIAL (ML) INJECTION ONCE
Refills: 0 | Status: DISCONTINUED | OUTPATIENT
Start: 2025-06-06 | End: 2025-06-09

## 2025-06-06 RX ORDER — ARIPIPRAZOLE 2 MG/1
10 TABLET ORAL AT BEDTIME
Refills: 0 | Status: DISCONTINUED | OUTPATIENT
Start: 2025-06-06 | End: 2025-06-16

## 2025-06-06 RX ORDER — LORAZEPAM 4 MG/ML
2 VIAL (ML) INJECTION EVERY 6 HOURS
Refills: 0 | Status: DISCONTINUED | OUTPATIENT
Start: 2025-06-06 | End: 2025-06-09

## 2025-06-06 RX ORDER — HALOPERIDOL 10 MG/1
5 TABLET ORAL EVERY 6 HOURS
Refills: 0 | Status: DISCONTINUED | OUTPATIENT
Start: 2025-06-06 | End: 2025-06-16

## 2025-06-06 RX ORDER — HALOPERIDOL 10 MG/1
5 TABLET ORAL ONCE
Refills: 0 | Status: DISCONTINUED | OUTPATIENT
Start: 2025-06-06 | End: 2025-06-16

## 2025-06-06 RX ADMIN — ARIPIPRAZOLE 10 MILLIGRAM(S): 2 TABLET ORAL at 21:42

## 2025-06-06 NOTE — ED BEHAVIORAL HEALTH ASSESSMENT NOTE - NSSUICPROTFACT_PSY_ALL_CORE
Responsibility to children, family, or others/Identifies reasons for living/Supportive social network of family or friends/Engaged in work or school/Positive therapeutic relationships/Other

## 2025-06-06 NOTE — ED PROVIDER NOTE - NSICDXPASTMEDICALHX_GEN_ALL_CORE_FT
PAST MEDICAL HISTORY:  Autism     Pre-diabetes     Schizo-affective psychosis     Schizophrenia
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
## 2025-06-06 NOTE — ED ADULT NURSE REASSESSMENT NOTE - NS ED NURSE REASSESS COMMENT FT1
Pt in NAD, breathing even and unlabored, denies SI/HI, denies hallucinations, calm and cooperative, tolerated PO intake, awaiting bed assignment, will continue to monitor.

## 2025-06-06 NOTE — BH CHART NOTE - NSEVENTNOTEFT_PSY_ALL_CORE
I have reviewed the information from the ED and evaluated the patient.  I confirm that the patient has a mental illness for which care and treatment in an inpatient psychiatric hospital is appropriate.  The patient is suitable for a voluntary status.

## 2025-06-06 NOTE — ED PROVIDER NOTE - OBJECTIVE STATEMENT
31 y/o female with PMHx of Autism and Schizophrenia who presented to the ED for increased agitation today. Pt states she has been hearing voices and getting agitated more agitated. Pt was reported to have yelled at day program staff and pushed a chair at home. Pt denies any fever, chills, nausea, vomiting, SOB, chest pain,  or abd pain. Pt admits to auditory hallucinations that are getting worse. Denies SI or HI.

## 2025-06-06 NOTE — ED ADULT NURSE NOTE - NSSUHOSCREENINGYN_ED_ALL_ED
Anesthesia Plan      History & Physical Review      ASA Status:  3 .    NPO Status:  > 6 hours    Plan for MAC with Intravenous induction. Maintenance will be TIVA.      Additional equipment: 2nd IV [place]  Fentanyl-based anesthetic      Postoperative Care  Postoperative pain management:  IV analgesics.      Consents  Anesthetic plan, risks, benefits and alternatives discussed with:  Patient.  Use of blood products discussed: Yes.   Use of blood products discussed with Patient.  Consented to blood products.  .                          .  
Yes - the patient is able to be screened

## 2025-06-06 NOTE — ED BEHAVIORAL HEALTH ASSESSMENT NOTE - RISK ASSESSMENT
Acute risk factors include worsening AH/depression/anxiety, command AH to hurt self/others, increased psychosocial stress with demands of various programs  Chronic risk factors include ASD, chronic mental illness, history of aggression  Protective factors include treatment seeking behavior, compliance and good response to medications, residential stability, engagement with programs, no legal issues, no substance use

## 2025-06-06 NOTE — ED ADULT NURSE NOTE - TEMPERATURE IN FAHRENHEIT (DEGREES F)
Thank you for choosing [facility].  It was a pleasure to see you for your office visit today.   Delon Malhotra MD PhD, Denice Cuellar MD,   Iona Ortega, M[facility]ick[facility],  Zuly Ramos, RN CNP  Samantha Sarmiento MD    If you had any blood work, imaging or other tests:  Normal test results will be mailed to your home address in a letter.  Abnormal results will be communicated to you via phone call / letter.  Please allow 2 weeks for processing/interpretation of most lab work.  For urgent issues that cannot wait until the next business day, call [phone] and ask for the Pediatric Endocrinologist on call.    RN Care Coordinators (non urgent) Mon- Fri:  Millicent Brock MS,RN  [phone]  Teresa Anne [phone]  Please leave a message on one line only. Calls will be returned as soon as possible.  Requests for results will be returned after your physician has been able to review the results.  Main Office: [phone]  Fax: [phone]  Growth Hormone Coordinator:  Lexie Al [phone]  Medication renewals: Contact your pharmacy. Allow 3-4 days for completion.     Scheduling:    Pediatric Call Center, [phone]  Infusion Center: [phone] (for stimulation tests)  Radiology/ [phone]     Services:   [phone]     Please try the Passport to [facility] ([facility]) phone application for Virtual Tours, Procedure Preparation, Resources, Preparation for Hospital Stay and the Coloring Board.        98

## 2025-06-06 NOTE — ED BEHAVIORAL HEALTH ASSESSMENT NOTE - DESCRIPTION
During course of ED visit patient was calm and cooperative. Patient was not aggressive or violent and did not require PRN medications.    T(C): 36.7 (06-06-25 @ 10:19), Max: 36.8 (06-06-25 @ 09:49)  HR: 80 (06-06-25 @ 10:19) (80 - 82)  BP: 121/78 (06-06-25 @ 10:19) (121/78 - 126/87)  RR: 16 (06-06-25 @ 10:19) (16 - 16)  SpO2: 99% (06-06-25 @ 10:19) (96% - 99%) Domiciled with her mother and grandparents, attends program [facility]. Also attends a dance program and clubhouse. prediabetes, obesity

## 2025-06-06 NOTE — ED BEHAVIORAL HEALTH NOTE - BEHAVIORAL HEALTH NOTE
PSYCKES:        Active Quality Flags • as of monthly QI report 5/1/2025   QARR - Improvement Measure  Adherence - Antipsychotic (Schiz) • No Diabetes Monitoring (HbA1C and LDL-C) Diabetes and Schiz • No Metabolic Monitoring (LDL-C) on Antipsychotic  General Medical Health  No Diabetes Monitoring (HbA1c) Diabetes • No Metabolic Monitoring (Gluc/HbA1c and LDL-C) on Antipsychotic (All)  High Utilization - Inpt/ER  2+ ER - Medical • 2+ Inpatient -  • 2+ Inpatient - [facility] Performance Tracking Measure (as of 11/01/2024)  No Engagement after  Inpatient  Mental Health Placement Consideration  1 or more [facility] services in past 5 years • 1 or more inpatient MH stays in past 5 years • Evidence of Supplemental Security Income (SSI) or SSD AND Any [facility]  Service in past 5 years  Treatment Engagement  Adherence - Antipsychotic (Schiz)  Diagnoses Past Year  Behavioral Health (4)  Most Recent:Intellectual Disabilities • Autism Spectrum Disorder • Schizophrenia • Schizoaffective Disorder  Most Frequent (# of services):Intellectual Disabilities(78) • Schizophrenia(13) • Autism Spectrum Disorder(3) • Schizoaffective Disorder(1)  Medical (10)  5 Most Recent:Illness, unspecified • Overexertion and strenuous or repetitive movements • Fracture of skull and facial bones • Other diseases of hard tissues of teeth • Place of occurrence of the external cause ...  5 Most Frequent (# of services):Other general symptoms and signs(59) • Illness, unspecified(20) • Overexertion and strenuous or repetitive movements(1) • Fracture of skull and facial bones(1) • Other diseases of hard tissues of teeth(1) ...  Medications Past YearLast   Clotrimazole- Betamethasone (Clotrimazole-Betamethasone) • Antifungals - Topical5/8/2025Dose: 1-0.05 %/day • Quantity: 45  Nystatin (Klayesta) • Antifungals - Topical5/8/2025Dose: 303444 UNIT/GM, 1/day • Quantity: 60  Nystatin (Nystatin) • Antifungals - Topical5/8/2025Dose: 963256 UNIT/GM, 1/day • Quantity: 60  Aripiprazole (Aripiprazole) • Antipsychotic5/4/2025Dose: 10 MG, 1/day • Quantity: 30  Metformin Hcl (Metformin Hcl Er (Osm)) • Biguanides5/4/2025Dose: 500 MG, 1/day • Quantity: 30  Haloperidol Decanoate (Haloperidol Decanoate) • Antipsychotic4/24/2025Dose: 100 MG/ML, .07/day • Quantity: 2  Amoxicillin (Amoxicillin) • Aminopenicillins4/10/2025Dose: 500 MG, 2/day • Quantity: 14  Ibuprofen (Ibuprofen) • Nonsteroidal Anti-inflammatory Agents (NSAIDs)3/31/2025Dose: 600 MG, 1/day • Quantity: 30  Metformin Hcl (Metformin Hcl) • Biguanides1/6/2025Dose: 500 MG, 1/day • Quantity: 30  Hydrocortisone (Hydrocortisone (Perianal)) • Rectal Bhggmjlk7912/16/2024Dose: 1 %, 1.89/day • Quantity: 28  Haloperidol (Haloperidol) • Laysgkrmuwayv73/8/2024Dose: 10 MG, 2/day • Quantity: 60  Outpatient Providers Past YearLast Service Date & Type  [facility]7/18/2024Clin -  Specialty  [facility]7/18/2024Multi-Type Group  All Hospital and Crisis Utilization • 5 Years  ER Visits# ProvidersLast ER Visit  1Xcrqhcf85/10/2025 at [facility]  Inpatient Admissions# ProvidersLast Inpatient Admission  [address] [place], [place] [address]/27/2025 at [facility]  Crisis Services# ProvidersLast Crisis Service  7Crisis Ddjcblkfdwy16/7/2025 at [facility]  Brief Overview as of 6/3/2025  RAYA

## 2025-06-06 NOTE — BH PATIENT PROFILE - HOME MEDICATIONS
amoxicillin 500 mg oral tablet , 1 tab(s) orally every 12 hours  ARIPiprazole 10 mg oral tablet , 1 tab(s) orally once a day  metFORMIN 500 mg oral tablet , 1 tab(s) orally once a day  haloperidol decanoate 100 mg/mL intramuscular solution , 200 milligram(s) intramuscularly every 4 weeks Last received 3/3/25

## 2025-06-06 NOTE — ED PROVIDER NOTE - CLINICAL SUMMARY MEDICAL DECISION MAKING FREE TEXT BOX
31 y/o female with PMHx of Autism and Schizophrenia who presented to the ED for increased agitation today.   Concern for Schizophrenia with auditory hallucinations. Pt currently calm and cooperative  Will get Psych for eval

## 2025-06-06 NOTE — ED ADULT TRIAGE NOTE - CHIEF COMPLAINT QUOTE
Hx schizoaffective disorder, Autism. Mother brought pt into ED stating pt was at day program and became agitated and yelled inappropriately at staff member. This morning pt was frustrated and pushed her chair at home but was not aggressive towards family. Pt states she feels anxious.

## 2025-06-06 NOTE — ED ADULT NURSE NOTE - OBJECTIVE STATEMENT
Pt presents history of schizophrenia, autism, coming to ED brought in by mother. Pt endorses that she got frustrated with staff member at facility, + auditory hallucinations, denies SI/HI, breathing even and unlabored, awaiting psychiatric eval, calm and cooperative at present, will continue to monitor.

## 2025-06-06 NOTE — ED ADULT NURSE REASSESSMENT NOTE - NS ED NURSE REASSESS COMMENT FT1
Pt in NAD, report on pt given to RN at [address] [place] [address], pt remains calm and cooperative, denies SI/HI, endorses visual and auditory hallucinations, legal paper work and belongings to be brought to unit with .

## 2025-06-06 NOTE — ED BEHAVIORAL HEALTH ASSESSMENT NOTE - OTHER PAST PSYCHIATRIC HISTORY (INCLUDE DETAILS REGARDING ONSET, COURSE OF ILLNESS, INPATIENT/OUTPATIENT TREATMENT)
PPHx ASD w/ schizophrenia, at least 10 previous admissions- last at [facility] 2/27-3/6/25, multiple ED presentations, no hx of suicide attempts, Outpatient with [facility], Attends [facility] program 3x a week.

## 2025-06-06 NOTE — ED BEHAVIORAL HEALTH ASSESSMENT NOTE - NSBHATTESTCOMMENTATTENDFT_PSY_A_CORE
Pt is a 31 yo woman, on SSD, PPHx ASD w/ schizophrenia, at least 10 previous admissions- last at [facility] 2/27-3/6/25, multiple ED presentations, no hx of suicide attempts, history of aggression in the past (hitting), no legal issues, no substance use. PMH of obesity, pre-diabetes, in [facility] and in outpatient treatment at [facility], brought in by her mother today for agitation this morning with increased symptoms of AH/VH, depression, anxiety.     The patient presents with worsening symptoms of psychosis, depression, and anxiety, including auditory and visual hallucinations that are impacting her ability to regulate her emotions, leading to a violent outburst this morning. AH/VH becoming worse in intensity and content, commanding her to hurt others and suggesting hurting herself, although pt reports she has no intent on following through with these commands. Based on pt and collateral, notable change from baseline. Patient is requesting and agreeing to voluntary inpatient admission, appropriate for voluntary admission for symptom stabilization and safety.

## 2025-06-06 NOTE — ED ADULT NURSE NOTE - NSICDXFAMILYHX_GEN_ALL_CORE_FT
Care Coordination Note    Called [facility], left message with MD's nurse line informing staff pt needs follow up appointment with MD Flores s/p 2 hospital discharges ([facility] and [facility]) for GI bleed and pt is currently being seen per MD Lewis today for lung mass, pt states she will have biopsy and port placement soon. Requested staff call pt back.     Venkat Monahan RN  Community Care Coordinator    10/3/2019, 11:36 AM       FAMILY HISTORY:  FH: rheumatoid arthritis, In Mother

## 2025-06-06 NOTE — ED BEHAVIORAL HEALTH ASSESSMENT NOTE - SUMMARY
Pt is a 31 yo woman, on SSD, PPHx ASD w/ schizophrenia, at least 10 previous admissions- last at [facility] 2/27-3/6/25, multiple ED presentations, no hx of suicide attempts, history of aggression in the past (hitting), no legal issues, no substance use. PMH of obesity, pre-diabetes, in [facility] and in outpatient treatment at [facility], brought in by her mother today for agitation this morning with increased symptoms of AH/VH, depression, anxiety.     The patient presents with worsening symptoms of psychosis, depression, and anxiety, including auditory and visual hallucinations that are impacting her ability to regulate her emotions, leading to a violent outburst this morning. AH/VH becoming worse in intensity and content, commanding her to hurt others and suggesting hurting herself, although pt reports she has no intent on following through with these commands. Based on pt and collateral, notable change from baseline. Patient is requesting and agreeing to voluntary inpatient admission, appropriate for voluntary admission for symptom stabilization and safety.     Plan:  1. Admit 9.13 Voluntary  2. Continue Haldol 200mg IM (last given 5/28/25, due 6/28)  3. Continue Abilify 10mg QHS  4. PRN Haldol 5mg PO/IM PRN, Ativan 2mg PO/IM PRN   For agitation please attempt verbal de-escalation and behavioral intervention first. If patient does not respond to above, may give recommended PRNs if no contraindications. If patient is refusing PO, remains an imminent danger to self or others and If Patient's  qtc <500, can escalate to IM formulation. If IM antipsychotic is administered, please perform follow-up ECG for QTc monitoring.   5. Diet: regular

## 2025-06-06 NOTE — ED BEHAVIORAL HEALTH ASSESSMENT NOTE - NSBHMSEKNOW_PSY_A_CORE
Pt aaox3 verbalized understanding of discharge instructions, had no questions, agreed to follow up with doctor as directed. Pt ambulated out with a steady gait. Normal

## 2025-06-06 NOTE — ED BEHAVIORAL HEALTH ASSESSMENT NOTE - HPI (INCLUDE ILLNESS QUALITY, SEVERITY, DURATION, TIMING, CONTEXT, MODIFYING FACTORS, ASSOCIATED SIGNS AND SYMPTOMS)
Mother brought pt into ED stating pt was at day program and became agitated and yelled inappropriately at staff member. This morning pt was frustrated and pushed her chair at home but was not aggressive towards family. Pt states she feels anxious. Pt is a 29 yo woman, on SSD, PPHx ASD w/ schizophrenia, at least 10 previous admissions- last at [facility] 2/27-3/6/25, multiple ED presentations, no hx of suicide attempts, history of aggression in the past (hitting), no legal issues, no substance use. PMH of obesity, pre-diabetes, in [facility] and in outpatient treatment at [facility], brought in by her mother today for agitation this morning with increased symptoms of AH/VH, depression, anxiety.     Pt reports that her mom brought her here today because she got agitated and upset this morning. States she was on the phone with someone from the program who said things to her that were upsetting, telling her that she was a disappointment for not coming into the program today. Pt felt triggered by this and heard AH/VH teling her things like "you're useless, you don't do anything, you're a disappointment." Pt became upset and began hitting and kicking the refrigerator, yelling and screaming. Pt's mother and grandmother were in the house, pt reports she was not physically violent towards them. States she was able to calm down after a while, but pt and her mother felt that she would benefit from getting more help at the hospital.     At baseline, has chronic AH/VH where she sees "lots of angels" who look like "dark figures" and she hears their voices. Has AH/VH constantly everyday, they never went away even after hospitalizations. More recently, reports that voices have been getting more intense and saying things to her that are more upsetting such as commanding her to hurt other people, they do not directly command her to hurt herself but say things like "I don't care you die, you might as well just grab that knife." Pt states she does not intend to follow these commands and has no intention of hurting others or herself but that she feels increasingly upset and conflicted because the voices would not stop. Feels paranoid at times that the angels would harm her, states there is one rachael that she is scared would rape her.     Also endorsing increased depressed mood and anxiety, has been having more days where she is "happy on the outside but crying on the inside." Has been getting about 4-5 hours of sleep per night, no changes in appetite. Currently, denies SI/SIB/HI. Denies thought blocking or insertion, denies ideas of reference, denies symptoms of atlha. Denies substance use. Denies access to firearms.      Collateral from mother (Priyanka Galeas [phone]):  Mom states that pt was supposed to go to the program this AM but stated she had sharp pains in her stomach. She was also endorsing that she wasn't feeling well mentally and saying that she wanted some help, so they called [phone] together and received guidance that they should maybe go to the hospital. Pt called her program to let them know that she won't be coming in that day, but spoke to a particular staff member who seemed to be telling her upsetting things about how many patients she has to see and how much workload the patient is adding by being inconsistent with her program. This seemed to trigger the patient and she subsequently became flustered and upset, started stomping on the floor, hitting a chair, threw her chair towards the wall, stating that she wants to hurt the person who she spoke to on the phone. Mom states that the pt never attempted to her hurt or her grandmother and that she directed her anger only towards the furniture.   Mom says pt has been under increased stress lately: has been participating in dancing/acting program that has been demanding a lot of her time and responsibilities, coming home extremely tired, pt continued to pour a lot of energy into this saying she didn't want to disappoint anyone. Pt also graduated from the [id number] program a couple weeks ago.   Feels pt's VH/AH has been getting worse. The voices telling her to hurt other people are more unusual and recent. She had voices telling her to hurt herself a few years ago, but not any recently. Also feels that her depression and anxiety are worse, mom has been noticing that she's been having a harder time getting up in the morning and doing things. Has been getting 4-5 hours sleep. She missed her abilify once or twice but has been mostly compliant. Received Haldol ZHAO last week 5/28. Has been attending [facility] 3x/week.

## 2025-06-07 NOTE — BH INPATIENT PSYCHIATRY ASSESSMENT NOTE - HPI (INCLUDE ILLNESS QUALITY, SEVERITY, DURATION, TIMING, CONTEXT, MODIFYING FACTORS, ASSOCIATED SIGNS AND SYMPTOMS)
As per ER Behavioral Health Assessment Note filed at [facility] on 6/6/2025: "Pt is a 31 yo woman, on SSD, PPHx ASD w/ schizophrenia, at least 10 previous admissions- last at [facility] 2/27-3/6/25, multiple ED presentations, no hx of suicide attempts, history of aggression in the past (hitting), no legal issues, no substance use. PMH of obesity, pre-diabetes, in [facility] and in outpatient treatment at [facility], brought in by her mother today for agitation this morning with increased symptoms of AH/VH, depression, anxiety.  Pt reports that her mom brought her here today because she got agitated and upset this morning. States she was on the phone with someone from the program who said things to her that were upsetting, telling her that she was a disappointment for not coming into the program today. Pt felt triggered by this and heard AH/VH teling her things like "you're useless, you don't do anything, you're a disappointment." Pt became upset and began hitting and kicking the refrigerator, yelling and screaming. Pt's mother and grandmother were in the house, pt reports she was not physically violent towards them. States she was able to calm down after a while, but pt and her mother felt that she would benefit from getting more help at the hospital.  At baseline, has chronic AH/VH where she sees "lots of angels" who look like "dark figures" and she hears their voices. Has AH/VH constantly everyday, they never went away even after hospitalizations. More recently, reports that voices have been getting more intense and saying things to her that are more upsetting such as commanding her to hurt other people, they do not directly command her to hurt herself but say things like "I don't care you die, you might as well just grab that knife." Pt states she does not intend to follow these commands and has no intention of hurting others or herself but that she feels increasingly upset and conflicted because the voices would not stop. Feels paranoid at times that the angels would harm her, states there is one rachael that she is scared would rape her. Also endorsing increased depressed mood and anxiety, has been having more days where she is "happy on the outside but crying on the inside." Has been getting about 4-5 hours of sleep per night, no changes in appetite. Currently, denies SI/SIB/HI. Denies thought blocking or insertion, denies ideas of reference, denies symptoms of talha. Denies substance use. Denies access to firearms.    Collateral from mother (Priyanka Galeas [phone]):  Mom states that pt was supposed to go to the program this AM but stated she had sharp pains in her stomach. She was also endorsing that she wasn't feeling well mentally and saying that she wanted some help, so they called [phone] together and received guidance that they should maybe go to the hospital. Pt called her program to let them know that she won't be coming in that day, but spoke to a particular staff member who seemed to be telling her upsetting things about how many patients she has to see and how much workload the patient is adding by being inconsistent with her program. This seemed to trigger the patient and she subsequently became flustered and upset, started stomping on the floor, hitting a chair, threw her chair towards the wall, stating that she wants to hurt the person who she spoke to on the phone. Mom states that the pt never attempted to her hurt or her grandmother and that she directed her anger only towards the furniture. Mom says pt has been under increased stress lately: has been participating in dancing/acting program that has been demanding a lot of her time and responsibilities, coming home extremely tired, pt continued to pour a lot of energy into this saying she didn't want to disappoint anyone. Pt also graduated from the [id number] program a couple weeks ago. Feels pt's VH/AH has been getting worse. The voices telling her to hurt other people are more unusual and recent. She had voices telling her to hurt herself a few years ago, but not any recently. Also feels that her depression and anxiety are worse, mom has been noticing that she's been having a harder time getting up in the morning and doing things. Has been getting 4-5 hours sleep. She missed her abilify once or twice but has been mostly compliant. Received Haldol ZHAO last week 5/28. Has been attending [facility] 3x/week."    On 2W interview, pt states she got upset after speaking to staff member at her program.  She states she has a tendency to overreact to things and states she should have found a better way to cope, like calling [employer], instead of getting angry.  Pt states at the time, her chronic voices got worse.  She states she is feeling better now, and the voices have gone back to usual and they are manageable.  Pt denies any thoughts of harming self or others now.  Pt states that she has [facility] three days a week, her dance program, and her clubhouse, but she states she needs something to do on the weekends (states she might want to join some sports) and also on Mondays, states that she feels this would help her remain calm and distracted.  Pt denies any new medical problems or allergies.  She also denies any recent ETOH or recreational drug use.

## 2025-06-07 NOTE — BH INPATIENT PSYCHIATRY ASSESSMENT NOTE - NSBHCHARTREVIEWVS_PSY_A_CORE FT
Vital Signs Last 24 Hrs  T(C): 36.7 (06-07-25 @ 07:51), Max: 36.7 (06-06-25 @ 13:59)  T(F): 98.1 (06-07-25 @ 07:51), Max: 98.1 (06-07-25 @ 07:51)  HR: 78 (06-06-25 @ 13:59) (78 - 78)  BP: 131/75 (06-06-25 @ 13:59) (131/75 - 131/75)  BP(mean): --  RR: 17 (06-07-25 @ 07:51) (16 - 18)  SpO2: 99% (06-06-25 @ 13:59) (99% - 99%)    Orthostatic VS  06-07-25 @ 07:51  Lying BP: --/-- HR: --  Sitting BP: 105/69 HR: 77  Standing BP: 110/72 HR: 85  Site: --  Mode: --  Orthostatic VS  06-06-25 @ 18:16  Lying BP: --/-- HR: --  Sitting BP: 112/66 HR: 91  Standing BP: 126/68 HR: 100  Site: --  Mode: --

## 2025-06-07 NOTE — PSYCHIATRIC REHAB INITIAL EVALUATION - NSBHPRRECOMMEND_PSY_ALL_CORE
The patient engaged appropriately with the writer during the initial session. The patient was able to endure the orienting session, oriented to the unit 2W, as well as the role/ function of  and Inpatient Psychiatric rehabilitation programing. Per the patient’s chart, she was BIB her mom for agitation with increased symptoms of AH/VH, depression, and anxiety. Writer identified an apt goal for the patient defined in the  plan of care. The patient’s Psychiatric Rehabilitation goal is to identify thoughts and self-talk that contribute to depression for her depressive symptoms goal. Psychiatric Rehabilitation staff will meet with patient weekly to review progress towards identified goal.

## 2025-06-07 NOTE — BH INPATIENT PSYCHIATRY ASSESSMENT NOTE - OTHER PAST PSYCHIATRIC HISTORY (INCLUDE DETAILS REGARDING ONSET, COURSE OF ILLNESS, INPATIENT/OUTPATIENT TREATMENT)
PPHx ASD w/ schizophrenia, at least 10 previous admissions- last at [facility] 2/27-3/6/25, multiple ED presentations, no hx of suicide attempts, Outpatient with [facility], Attends [facility] program 3x a week.

## 2025-06-07 NOTE — BH INPATIENT PSYCHIATRY ASSESSMENT NOTE - MSE UNSTRUCTURED FT
Appearance: Dressed appropriately.  Behavior: Cooperative.    Motor: No abnormal movements, no psychomotor slowing or activation.  Speech: Regular rate.  Mood: "Better."  Affect: Pleasant.  Thought Process: Linear.  Associations: Fair.  Thought Content: Chronic AH.  Denies SIIP or HIIP.  Insight: Limited.  Judgment: Fair on interview.  Attention: Fair.  Language: Fluent.  Gait: Intact.

## 2025-06-07 NOTE — BH INPATIENT PSYCHIATRY ASSESSMENT NOTE - NSBHMETABOLIC_PSY_ALL_CORE_FT
BMI: BMI (kg/m2): 40.7 (06-06-25 @ 18:16)  HbA1c: A1C with Estimated Average Glucose Result: 5.2 % (06-06-25 @ 13:04)    Glucose: POCT Blood Glucose.: 88 mg/dL (02-27-25 @ 07:30)    BP: 131/75 (06-06-25 @ 13:59) (121/78 - 131/75)Vital Signs Last 24 Hrs  T(C): 36.7 (06-07-25 @ 07:51), Max: 36.7 (06-06-25 @ 13:59)  T(F): 98.1 (06-07-25 @ 07:51), Max: 98.1 (06-07-25 @ 07:51)  HR: 78 (06-06-25 @ 13:59) (78 - 78)  BP: 131/75 (06-06-25 @ 13:59) (131/75 - 131/75)  BP(mean): --  RR: 17 (06-07-25 @ 07:51) (16 - 18)  SpO2: 99% (06-06-25 @ 13:59) (99% - 99%)    Orthostatic VS  06-07-25 @ 07:51  Lying BP: --/-- HR: --  Sitting BP: 105/69 HR: 77  Standing BP: 110/72 HR: 85  Site: --  Mode: --  Orthostatic VS  06-06-25 @ 18:16  Lying BP: --/-- HR: --  Sitting BP: 112/66 HR: 91  Standing BP: 126/68 HR: 100  Site: --  Mode: --    Lipid Panel: Date/Time: 02-27-25 @ 10:32  Cholesterol, Serum: 148  LDL Cholesterol Calculated: 86  HDL Cholesterol, Serum: 50  Total Cholesterol/HDL Ration Measurement: --  Triglycerides, Serum: 57

## 2025-06-07 NOTE — BH INPATIENT PSYCHIATRY ASSESSMENT NOTE - CURRENT MEDICATION
MEDICATIONS  (STANDING):  ARIPiprazole 10 milliGRAM(s) Oral at bedtime    MEDICATIONS  (PRN):  haloperidol     Tablet 5 milliGRAM(s) Oral every 6 hours PRN Mild-moderate agitation, secondary to psychosis, talha, or poor impulse control  haloperidol    Injectable 5 milliGRAM(s) IntraMuscular once PRN Severe agitation secondary to psychosis, talha, or poor impulse control.  LORazepam     Tablet 2 milliGRAM(s) Oral every 6 hours PRN Mild-moderate agitation secondary to psychosis, talha, or poor impulse, Mild-moderate anxiety  LORazepam   Injectable 2 milliGRAM(s) IntraMuscular once PRN Severe agitation secondary to psychosis, talha, or poor impulse control.  Severe anxiety.

## 2025-06-07 NOTE — BH INPATIENT PSYCHIATRY ASSESSMENT NOTE - DESCRIPTION
Domiciled with her mother and grandparents, attends program [facility]. Also attends a dance program and clubhouse.

## 2025-06-08 RX ADMIN — ARIPIPRAZOLE 10 MILLIGRAM(S): 2 TABLET ORAL at 20:02

## 2025-06-09 PROCEDURE — 99232 SBSQ HOSP IP/OBS MODERATE 35: CPT

## 2025-06-09 RX ORDER — LORAZEPAM 4 MG/ML
2 VIAL (ML) INJECTION EVERY 6 HOURS
Refills: 0 | Status: DISCONTINUED | OUTPATIENT
Start: 2025-06-09 | End: 2025-06-16

## 2025-06-09 RX ORDER — LORAZEPAM 4 MG/ML
2 VIAL (ML) INJECTION ONCE
Refills: 0 | Status: DISCONTINUED | OUTPATIENT
Start: 2025-06-09 | End: 2025-06-16

## 2025-06-09 RX ADMIN — ARIPIPRAZOLE 10 MILLIGRAM(S): 2 TABLET ORAL at 21:00

## 2025-06-09 NOTE — LEGAL STATUS PROGRESS NOTE - NSLSEXPIRATIONDATE_PSY_ALL_CORE
03-Oct-2025 00:00
1y1m male otherwise healthy to the ER via triage with parents c/o of rash. Pt mother reports that pt was prescribed amoxicillin about a week ago by pediatrician and has since developed a rash. Pt mother reports that pt has been itching at rash and has been more irritable since. Pt eating and drinking normally, making wet diapers.

## 2025-06-09 NOTE — BH INPATIENT PSYCHIATRY PROGRESS NOTE - NSBHATTESTATTENDBILLTIME2_PSY_A_CORE
From: Jayy Her  To: Tayler Flores MD  Sent: 4/11/2017 10:51 AM EDT  Subject: Medication Renewal Request    Original authorizing provider: MD Jayy Syed would like a refill of the following medications:  FLUoxetine (PROZAC) 20 mg tablet Tayler Flores MD]    Preferred pharmacy: [address]    Comment:
I have reviewed and verified the documentation.

## 2025-06-10 PROCEDURE — 99232 SBSQ HOSP IP/OBS MODERATE 35: CPT

## 2025-06-10 RX ADMIN — ARIPIPRAZOLE 10 MILLIGRAM(S): 2 TABLET ORAL at 21:35

## 2025-06-10 NOTE — BH SOCIAL WORK INITIAL PSYCHOSOCIAL EVALUATION - OTHER PAST PSYCHIATRIC HISTORY (INCLUDE DETAILS REGARDING ONSET, COURSE OF ILLNESS, INPATIENT/OUTPATIENT TREATMENT)
Pt is a 31 yo woman, on SSD, PPHx ASD w/ schizophrenia, at least 10 previous admissions- last at [facility] 2/27-3/6 , multiple ED presentations, no hx of suicide attempts, history of aggression in the past (hitting), no legal issues, no substance use. PMH of obesity, pre-diabetes who in treatment at [facility], last seen on 2/3/25, bib today by her mom for worsening of +ah, depression and anxiety. pt presented to the  ED due to increased feelings of depression and anxiety, as well as more frequent auditory hallucinations. She endorses chronic auditory hallucinations, describing the voices saying they are  "bigger and better" than her and making "vain" remarks.  She stated that over the past week, the voices have become louder, and her coping skills are no longer effective.  She attributes her increased depression and anxiety over the last few days to these intensified auditory hallucinations .In addition to auditory hallucinations, she reports chronic visual hallucinations of "dark spirits and angels."  She described having a demonic side that will have to kill the voices with either a knife or chainsaw, but clarified this would be done "spiritually," not with a physical weapon.  She denies command auditory hallucinations. The patient also reported poor sleep over the last few days, attributing it to the voices, and stated she is only sleeping about four hours a night.  Last week, she missed one day of her program due to feeling overwhelmed by the voices and was distressed by employee stating she should come to treatment. She reports crying more often because of the worsening auditory hallucinations. This AM she called [phone] because she was crying, felt overwhelmed by the voices and didn't know what to do. Patient adamantly denies SI, intent or plan; denies any HI, violent thoughts.     as per Ed colleteral, Dr. Crowe requesting call to ED- He reports patient was in respite recently in [place] x 1 week last month. She was feeling lonely and depressed/AH were bothersome. She has been struggling recently. She recently restarted individual therapy and asked for group therapy.  He stated patient occasionally will miss a dose of Abilify but is otherwise compliant. He is not sure what the precipitating factor is. He does not think there is secondary gain. He stated if patient wants admission that would be fine.      [facility] program [phone]    Spoke with Mother (see  note)- Per mother patient takes medication at night because it makes her drowsy in the AM. So she switched to QHS.  She misses medication 3x a week. Mother stated medication has to be crushed and mixed with applesauce. Mother stated she will get home 730p/8p and patient will already be sleeping and miss her dose of Abilify. Trigger may have been patient being bullied in her program by a peer- that peer was reported and was not longer in the program.

## 2025-06-10 NOTE — BH SOCIAL WORK INITIAL PSYCHOSOCIAL EVALUATION - NSPROPTRIGHTSUPPORTPERSON_GEN_A_NUR
"Please see \"Imaging\" tab under \"Chart Review\" for details of today's visit.    Dionicio Emmanuel    " same name as above

## 2025-06-10 NOTE — BH SOCIAL WORK INITIAL PSYCHOSOCIAL EVALUATION - NSBHHOUSE_PSY_ALL_CORE
[facility] MEDICINE PROGRESS NOTE    Patient: Charley Odonnell Today's Date: 1/12/2022   YOB: 1931 Admission Date: 1/5/2022  7:34 PM   MRN: [id number] Inpatient LOS: 7 day(s)   Room:  [address] Hospital Day:  Hospital Day: 8       History and Subjective complaints     Chief Complaint:  Patient seen and examined.  No new complaints today.    Interval history and Overnight events:  No acute events overnight    Patient seen and examined by me in follow up.    Hospital Course  Patient is 89-year-old female who was initially admitted to the ICU for altered mental status.  Found to have AFib with RVR along with hypertensive emergency.  Advanced age with cognitive impairment and gait abnormalities.  Recent C. difficile infection along with CKD and hypertension along with diabetes mellitus.  Patient is now on the medical floor.        Reviewed Pertinent Histories: Medical History, Surgical History, Social History, Family History,     ROS: 12-point review of systems negative except as above.    Medications: Reviewed     Scheduled Medications:    [START ON 1/13/2022] aspirin, 81 mg, Daily  atorvastatin, 40 mg, Nightly  carvedilol, 12.5 mg, 2 times per day  levETIRAcetam, 500 mg, 2 times per day  calcitRIOL, 0.25 mcg, Every Other Day  Potassium Standard Replacement Protocol, , See Admin Instructions  Magnesium Standard Replacement Protocol, , See Admin Instructions  Phosphorus Standard Replacement Protocol, , See Admin Instructions  heparin (porcine), 5,000 Units, 3 times per day  insulin regular (human), , 4 times per day  sodium chloride (PF), 2 mL, 2 times per day      Continuous Infusions:    • sodium chloride 0.9% infusion     • sodium chloride 0.9% infusion       PRN Medications:  labetalol, sodium chloride, sodium chloride, sodium chloride, sodium chloride, polyethylene glycol, docusate sodium-sennosides, magnesium hydroxide, acetaminophen **OR** acetaminophen, dextrose, dextrose,  glucagon, dextrose, dextrose, hydrALAZINE (APRESOLINE) injection, sodium chloride      Physical Examination       Vital 24 Hour Range Most Recent Value   Temperature Temp  Min: 98 °F (36.7 °C)  Max: 98.2 °F (36.8 °C) 98.1 °F (36.7 °C)   Pulse Pulse  Min: 81  Max: 113 81   Respiratory Resp  Min: 16  Max: 18 16   Blood Pressure BP  Min: 135/81  Max: 169/78 (!) 160/77   Pulse Oximetry SpO2  Min: 97 %  Max: 98 % 97 %   Arterial BP No data recorded     O2 No data recorded       Intake and Output:      Intake/Output Summary (Last 24 hours) at 1/12/2022 1308  Last data filed at 1/11/2022 1500  Gross per 24 hour   Intake 480 ml   Output --   Net 480 ml       Last Stool Occurrence:  1 (01/12/22 0930)    Vital Most Recent Value First Value   Weight 56.9 kg (125 lb 6.4 oz) Weight: 62 kg (136 lb 11 oz)   Height 5' 2\" (157.5 cm) Height: 5' 2\" (157.5 cm)   BMI 22.94 N/A     General: Looks acutely ill, well developed, well nourished  CV: irregularly irregular  Resp: clear to auscultation bilaterally  Abd: soft, nontender and nondistended  Ext: no rashes, lesions, or ulcers noted  Skin: no rashes, lesions, or ulcers noted  Neuro: no focal deficits noted  Psych: Mild cognitive impairement    Test Results     Labs: The Laboratory values listed below have been reviewed and pertinent findings discussed in the Assessment and Plan.    Laboratory values:   Recent Labs   Lab 01/10/22  0503 01/09/22  0655 01/08/22  0550   WBC 7.6 10.7 11.9*   HGB 13.3 15.1 13.5   HCT 40.9 47.4* 41.6    276 250       Recent Labs   Lab 01/10/22  0503 01/09/22  0655 01/08/22  0937   SODIUM 144 144 145   POTASSIUM 3.6 3.6 3.6   CHLORIDE 112* 111* 112*   CO2 25 25 27   CALCIUM 8.6 9.3 9.4   GLUCOSE 179* 233* 208*   BUN 21* 28* 30*   CREATININE 1.24* 1.31* 1.56*        Recent Labs   Lab 01/07/22  0415 01/06/22  0459 01/05/22  1954   ALBUMIN 2.9*  --  3.6   PHOS 2.9 3.9  --    AST 29  --  26   GPT 31  --  25   BILIRUBIN 0.4  --  0.2     Recent Labs   Lab  01/06/22  0459   PCT 0.62*     Recent Labs   Lab 01/11/22  1406 01/11/22  2345 01/12/22  0926 01/12/22  1244   GLUCOSE BEDSIDE 290* 144* 175* 261*         Recent Labs   Lab 01/06/22  1825 01/06/22  1800 01/06/22  0459 01/05/22  1954 01/05/22 1954   HTROPI 95* 101* 96*   < > 25   CPK  --   --   --   --  89   NTPROB  --   --  9,828*  --   --     < > = values in this interval not displayed.       Lab Results   Component Value Date    HGBA1C 6.7 (H) 01/07/2022        Lab Results   Component Value Date    CHOLESTEROL 118 01/06/2022    HDL 63 01/06/2022    CALCLDL 30 01/06/2022        Lab Results   Component Value Date    USPG >1.030 (H) 01/06/2022    UPROT 100  (A) 01/06/2022    UWBC Negative 01/06/2022    URBC Negative 01/06/2022    UPH 5.0 01/06/2022    UBACTRA Few (A) 01/06/2022             Radiology: Imaging studies have been reviewed and pertinent findings discussed in the Assessment and Plan.    No results found for any visits on 01/05/22 (from the past 48 hour(s)).    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  Continue with Keppra at this time.  EEG is completed and discontinued.  Patient looks overall better.  Slowly progressing and making good strides.  Definitely would benefit from therapy.    AFib with rapid ventricular response -underlying paroxysmal atrial fibrillation.  Patient has opted for no for systemic anticoagulation.  Will reassess that with the patient.CHADSVASc Stroke Risk Score = 5    Type 2 non STEMI likely secondary to demand ischemia    Hypertensive emergency improved at this time.    Chronic kidney disease stage 4 creatinine at baseline of 1.5 at this time.      Diabetes mellitus type 2 check blood sugars 4 times a day and sliding scale insulin.                  Consults:    CONSULT TO TELESTROKE  IP CONSULT TO SMOKING  CESSATION PROGRAM  IP CONSULT TO SOCIAL WORK  IP CONSULT TO PHYSICAL MEDICINE & REHAB  IP CONSULT TO NUTRITIONAL SERVICES - TUBE FEEDING  IP CONSULT TO NEUROLOGY  IP CONSULT TO SOCIAL WORK    Diet:  Regular Diet  Nursing To Pass Tray - Periodic Supervision  Therapy Orders:    PT and OT Orders Placed this Encounter   Procedures   • Occupational Therapy   • Occupational Therapy   • Physical Therapy   • Physical Therapy       Smoking status- {     Tobacco Use: Low Risk    • Smoking Tobacco Use: Never Smoker   • Smokeless Tobacco Use: Never Used     Nutrition status- appropriate  Body mass index is 22.94 kg/m². - appropriate weight BMI 18.5-24  DVT Prophylaxis - Heparin 5000 units sub q tid  Limited English proficiency with :  No         Advanced Directives     Code Status: Selective Treatment/DNR           Discharge Plan     The patients treatment plans were discussed with patient.     Recommendations for Discharge   SW Other (comment) (Location not determined at this time )   PT Post acute therapy,Intensive therapy program   OT Sub-acute nursing home   SLP SNF referrals     Anticipated discharge destination: Skilled Nursing Facility SNF  Expected Discharge Date:  01/13/2022     Barriers to Discharge:  Mostly awaiting placement at this time.        Denis Campbell MD   Hospitalist  1/12/2022  1:08 PM    (Contact by secure chat)    Home alone

## 2025-06-11 PROCEDURE — 99232 SBSQ HOSP IP/OBS MODERATE 35: CPT

## 2025-06-11 RX ADMIN — ARIPIPRAZOLE 10 MILLIGRAM(S): 2 TABLET ORAL at 21:29

## 2025-06-11 RX ADMIN — Medication 1 LOZENGE: at 18:53

## 2025-06-12 PROCEDURE — 99231 SBSQ HOSP IP/OBS SF/LOW 25: CPT

## 2025-06-12 RX ADMIN — ARIPIPRAZOLE 10 MILLIGRAM(S): 2 TABLET ORAL at 20:26

## 2025-06-13 PROCEDURE — 99231 SBSQ HOSP IP/OBS SF/LOW 25: CPT

## 2025-06-13 RX ORDER — ARIPIPRAZOLE 2 MG/1
1 TABLET ORAL
Qty: 15 | Refills: 0
Start: 2025-06-13 | End: 2025-06-27

## 2025-06-13 RX ADMIN — ARIPIPRAZOLE 10 MILLIGRAM(S): 2 TABLET ORAL at 20:41

## 2025-06-13 NOTE — BH INPATIENT PSYCHIATRY PROGRESS NOTE - NSTXDCOTHRGOAL_PSY_ALL_CORE
Patient will report improved mood and insight into coping skills for symptoms, and with no SIIP.

## 2025-06-13 NOTE — BH INPATIENT PSYCHIATRY PROGRESS NOTE - NSBHFUPINTERVALCCFT_PSY_A_CORE
Seen for follow up of agitation.
Seen for follow up for ASD, Schizoaffective Disorder.

## 2025-06-13 NOTE — BH INPATIENT PSYCHIATRY PROGRESS NOTE - NSBHASSESSSUMMFT_PSY_ALL_CORE
30F w/ASD and schizophrenia, admitted with worsening voices after emotional dysregulation from talking with her program.      Continues to be emotionally regulated, in good behavioral control on unit this morning.    1. No CO 1:1 for now.  2. Continue home meds for Schizophrenia.  3. Collateral if pt allows for HIPAA release.  4. Dispo: Home with outpatient care - [facility] and clubhouse.

## 2025-06-13 NOTE — BH INPATIENT PSYCHIATRY PROGRESS NOTE - NSBHATTESTAPPBILLTIME_PSY_A_CORE
I attest my time as HEATHER is greater than 50% of the total combined time spent on qualifying patient care activities. I have reviewed and verified the documentation.

## 2025-06-13 NOTE — BH INPATIENT PSYCHIATRY PROGRESS NOTE - ATTENDING COMMENTS
Care was discussed and reviewed in the interdisciplinary treatment team.  I, Mirian Tucker MD, have reviewed and verified the documentation.      
Care was discussed and reviewed in the interdisciplinary treatment team.  IMirian MD, have reviewed and verified the documentation.      Patient was seen and evaluated with the NP present during the assessment. Patient was calm and pleasant. She is cooperative, willing to take medications and work on a safe discharge plan. Patient admitted to experiencing AH prior coming to the hospital despite been compliant with the treatment. Denies any symptoms at the moment and contracts for safety. 
Care was discussed and reviewed in the interdisciplinary treatment team.  I, Mirian Tucker MD, have reviewed and verified the documentation.      
Care was discussed and reviewed in the interdisciplinary treatment team.  I, Mirian Tucker MD, have reviewed and verified the documentation.

## 2025-06-13 NOTE — BH INPATIENT PSYCHIATRY PROGRESS NOTE - NSBHCHARTREVIEWVS_PSY_A_CORE FT
Vital Signs Last 24 Hrs  T(C): 36.8 (06-09-25 @ 08:11), Max: 36.8 (06-09-25 @ 08:11)  T(F): 98.3 (06-09-25 @ 08:11), Max: 98.3 (06-09-25 @ 08:11)  HR: --  BP: --  BP(mean): --  RR: --  SpO2: --    Orthostatic VS  06-09-25 @ 08:11  Lying BP: --/-- HR: --  Sitting BP: 129/70 HR: 72  Standing BP: 113/71 HR: 87  Site: --  Mode: --  Orthostatic VS  06-08-25 @ 08:18  Lying BP: --/-- HR: --  Sitting BP: 121/73 HR: 86  Standing BP: 113/79 HR: 97  Site: --  Mode: --  
Vital Signs Last 24 Hrs  T(C): 36.9 (06-10-25 @ 07:53), Max: 36.9 (06-10-25 @ 07:53)  T(F): 98.4 (06-10-25 @ 07:53), Max: 98.4 (06-10-25 @ 07:53)  HR: --  BP: --  BP(mean): --  RR: 18 (06-10-25 @ 07:53) (18 - 18)  SpO2: --    Orthostatic VS  06-10-25 @ 07:53  Lying BP: --/-- HR: --  Sitting BP: 109/68 HR: 85  Standing BP: 120/74 HR: 82  Site: --  Mode: --  Orthostatic VS  06-09-25 @ 08:11  Lying BP: --/-- HR: --  Sitting BP: 129/70 HR: 72  Standing BP: 113/71 HR: 87  Site: --  Mode: --  
Vital Signs Last 24 Hrs  T(C): 36.9 (06-08-25 @ 08:18), Max: 36.9 (06-08-25 @ 08:18)  T(F): 98.4 (06-08-25 @ 08:18), Max: 98.4 (06-08-25 @ 08:18)  HR: --  BP: --  BP(mean): --  RR: 20 (06-08-25 @ 08:18) (20 - 20)  SpO2: --    Orthostatic VS  06-08-25 @ 08:18  Lying BP: --/-- HR: --  Sitting BP: 121/73 HR: 86  Standing BP: 113/79 HR: 97  Site: --  Mode: --  Orthostatic VS  06-07-25 @ 07:51  Lying BP: --/-- HR: --  Sitting BP: 105/69 HR: 77  Standing BP: 110/72 HR: 85  Site: --  Mode: --  Orthostatic VS  06-06-25 @ 18:16  Lying BP: --/-- HR: --  Sitting BP: 112/66 HR: 91  Standing BP: 126/68 HR: 100  Site: --  Mode: --  
Vital Signs Last 24 Hrs  T(C): 37.1 (06-13-25 @ 08:25), Max: 37.1 (06-13-25 @ 08:25)  T(F): 98.7 (06-13-25 @ 08:25), Max: 98.7 (06-13-25 @ 08:25)  HR: --  BP: --  BP(mean): --  RR: 17 (06-13-25 @ 08:25) (17 - 17)  SpO2: --    Orthostatic VS  06-13-25 @ 08:25  Lying BP: --/-- HR: --  Sitting BP: 112/74 HR: 86  Standing BP: 106/69 HR: 100  Site: --  Mode: --  Orthostatic VS  06-12-25 @ 07:39  Lying BP: --/-- HR: --  Sitting BP: 117/78 HR: 90  Standing BP: 109/83 HR: 108  Site: --  Mode: --  
Vital Signs Last 24 Hrs  T(C): 37.4 (06-12-25 @ 07:39), Max: 37.4 (06-12-25 @ 07:39)  T(F): 99.3 (06-12-25 @ 07:39), Max: 99.3 (06-12-25 @ 07:39)  HR: --  BP: --  BP(mean): --  RR: 20 (06-12-25 @ 07:39) (20 - 20)  SpO2: --    Orthostatic VS  06-12-25 @ 07:39  Lying BP: --/-- HR: --  Sitting BP: 117/78 HR: 90  Standing BP: 109/83 HR: 108  Site: --  Mode: --  Orthostatic VS  06-11-25 @ 07:38  Lying BP: --/-- HR: --  Sitting BP: 114/71 HR: 91  Standing BP: 100/62 HR: 93  Site: --  Mode: --  
Vital Signs Last 24 Hrs  T(C): 37.1 (06-11-25 @ 07:38), Max: 37.1 (06-11-25 @ 07:38)  T(F): 98.8 (06-11-25 @ 07:38), Max: 98.8 (06-11-25 @ 07:38)  HR: --  BP: --  BP(mean): --  RR: 20 (06-11-25 @ 07:38) (20 - 20)  SpO2: --    Orthostatic VS  06-11-25 @ 07:38  Lying BP: --/-- HR: --  Sitting BP: 114/71 HR: 91  Standing BP: 100/62 HR: 93  Site: --  Mode: --  Orthostatic VS  06-10-25 @ 07:53  Lying BP: --/-- HR: --  Sitting BP: 109/68 HR: 85  Standing BP: 120/74 HR: 82  Site: --  Mode: --

## 2025-06-13 NOTE — BH INPATIENT PSYCHIATRY PROGRESS NOTE - NSBHATTESTTYPEVISIT_PSY_A_CORE
Attending Only
On-site Attending supervising HEATHER (99XXX codes)
Attending Only
On-site Attending supervising HEATHER (99XXX codes)

## 2025-06-13 NOTE — BH INPATIENT PSYCHIATRY PROGRESS NOTE - NSBHFUPINTERVALHXFT_PSY_A_CORE
Chart reviewed and case discussed in team meeting. Patient was calm, cooperative, and pleasant. Patient reported feeling great today. Patient is denying all psychiatric symptoms, said that she is feeling better and is looking forward for discharge. She denied SIIP, HIIP, AVH.   
Chart reviewed and case discussed in team meeting. Patient was calm, cooperative, and pleasant. Patient reported feeling better. Patient is denying all psychiatric symptoms, said that she is feeling better and is looking forward for discharge. Discussed discharge planned for Monday, she agreed. She denied SIIP, HIIP, AVH.   
Chart reviewed and case discussed in team meeting. Patient was calm, cooperative, and pleasant. Patient reported feeling better. Patient endorsed AVH, of good angles and God. God is telling her "you're going to be fine". Patient reported going to groups and socializing has helped. Encouraged her to engage in Day program and PROS after discharge. Discussed Abilify ZHAO, she is agreeable. She denied SIIP, HIIP, AVH.   
No overnight events.  Pt states her moods are good.  Pt reports adequate sleep and appetite.  Pt denies hearing any voices or having any suicidality or homicidality. 
Chart reviewed and case discussed in team meeting. Patient was calm, cooperative, and pleasant. Patient reported feeling better. Patient endorsed AVH of good things, seeing "them dressed in rainbows and smiley faces". Patient reported going to groups and socializing has help. Encouraged her to engage in Day program and PROS after discharge. She denied SIIP, HIIP, AVH.   
Chart reviewed and case discussed in team meeting. Patient was calm, cooperative, and pleasant. Patient reported she responded inappropriately to disappointing news and now understands she could have responded better. She endorse AVH prior admission, denied currently. Patient reported she was adherent with medications prior to admission, Abilify 10mg daily and Haldol Dec 100mg monthly, last injection 5/28/25. She denied SIIP, HIIP, AVH.

## 2025-06-13 NOTE — BH INPATIENT PSYCHIATRY PROGRESS NOTE - MSE UNSTRUCTURED FT
Appearance: Dressed appropriately.  Behavior: Cooperative.    Motor: No abnormal movements, no psychomotor slowing or activation.  Speech: Regular rate.  Mood: "Good."  Affect: Pleasant.  Thought Process: Linear.  Associations: Fair.  Thought Content: Chronic AH, not present at time of interview.  Denies SIIP or HIIP.  Insight: Limited.  Judgment: Fair on interview.  Attention: Fair.  Language: Fluent.  Gait: Intact. 

## 2025-06-13 NOTE — BH INPATIENT PSYCHIATRY PROGRESS NOTE - NSBHATTESTBILLING_PSY_A_CORE
94746-Iczwzegzcy OBS or IP - moderate complexity OR 35-49 mins
59765-Blzwqenuvt OBS or IP - low complexity OR 25-34 mins
87220-Jcpksqirwz OBS or IP - moderate complexity OR 35-49 mins
14468-Ujtueqkove OBS or IP - moderate complexity OR 35-49 mins
83862-Cknufhrjsh OBS or IP - low complexity OR 25-34 mins
65766-Sbkwhymtik OBS or IP - moderate complexity OR 35-49 mins

## 2025-06-13 NOTE — BH INPATIENT PSYCHIATRY PROGRESS NOTE - NSBHMETABOLIC_PSY_ALL_CORE_FT
BMI: BMI (kg/m2): 40.7 (06-06-25 @ 18:16)  HbA1c: A1C with Estimated Average Glucose Result: 5.2 % (06-06-25 @ 13:04)    Glucose: POCT Blood Glucose.: 88 mg/dL (02-27-25 @ 07:30)    BP: --Vital Signs Last 24 Hrs  T(C): 37.1 (06-13-25 @ 08:25), Max: 37.1 (06-13-25 @ 08:25)  T(F): 98.7 (06-13-25 @ 08:25), Max: 98.7 (06-13-25 @ 08:25)  HR: --  BP: --  BP(mean): --  RR: 17 (06-13-25 @ 08:25) (17 - 17)  SpO2: --    Orthostatic VS  06-13-25 @ 08:25  Lying BP: --/-- HR: --  Sitting BP: 112/74 HR: 86  Standing BP: 106/69 HR: 100  Site: --  Mode: --  Orthostatic VS  06-12-25 @ 07:39  Lying BP: --/-- HR: --  Sitting BP: 117/78 HR: 90  Standing BP: 109/83 HR: 108  Site: --  Mode: --    Lipid Panel: Date/Time: 02-27-25 @ 10:32  Cholesterol, Serum: 148  LDL Cholesterol Calculated: 86  HDL Cholesterol, Serum: 50  Total Cholesterol/HDL Ration Measurement: --  Triglycerides, Serum: 57  
BMI: BMI (kg/m2): 40.7 (06-06-25 @ 18:16)  HbA1c: A1C with Estimated Average Glucose Result: 5.2 % (06-06-25 @ 13:04)    Glucose: POCT Blood Glucose.: 88 mg/dL (02-27-25 @ 07:30)    BP: --Vital Signs Last 24 Hrs  T(C): 36.8 (06-09-25 @ 08:11), Max: 36.8 (06-09-25 @ 08:11)  T(F): 98.3 (06-09-25 @ 08:11), Max: 98.3 (06-09-25 @ 08:11)  HR: --  BP: --  BP(mean): --  RR: --  SpO2: --    Orthostatic VS  06-09-25 @ 08:11  Lying BP: --/-- HR: --  Sitting BP: 129/70 HR: 72  Standing BP: 113/71 HR: 87  Site: --  Mode: --  Orthostatic VS  06-08-25 @ 08:18  Lying BP: --/-- HR: --  Sitting BP: 121/73 HR: 86  Standing BP: 113/79 HR: 97  Site: --  Mode: --    Lipid Panel: Date/Time: 02-27-25 @ 10:32  Cholesterol, Serum: 148  LDL Cholesterol Calculated: 86  HDL Cholesterol, Serum: 50  Total Cholesterol/HDL Ration Measurement: --  Triglycerides, Serum: 57  
BMI: BMI (kg/m2): 40.7 (06-06-25 @ 18:16)  HbA1c: A1C with Estimated Average Glucose Result: 5.2 % (06-06-25 @ 13:04)    Glucose: POCT Blood Glucose.: 88 mg/dL (02-27-25 @ 07:30)    BP: --Vital Signs Last 24 Hrs  T(C): 37.1 (06-11-25 @ 07:38), Max: 37.1 (06-11-25 @ 07:38)  T(F): 98.8 (06-11-25 @ 07:38), Max: 98.8 (06-11-25 @ 07:38)  HR: --  BP: --  BP(mean): --  RR: 20 (06-11-25 @ 07:38) (20 - 20)  SpO2: --    Orthostatic VS  06-11-25 @ 07:38  Lying BP: --/-- HR: --  Sitting BP: 114/71 HR: 91  Standing BP: 100/62 HR: 93  Site: --  Mode: --  Orthostatic VS  06-10-25 @ 07:53  Lying BP: --/-- HR: --  Sitting BP: 109/68 HR: 85  Standing BP: 120/74 HR: 82  Site: --  Mode: --    Lipid Panel: Date/Time: 02-27-25 @ 10:32  Cholesterol, Serum: 148  LDL Cholesterol Calculated: 86  HDL Cholesterol, Serum: 50  Total Cholesterol/HDL Ration Measurement: --  Triglycerides, Serum: 57  
BMI: BMI (kg/m2): 40.7 (06-06-25 @ 18:16)  HbA1c: A1C with Estimated Average Glucose Result: 5.2 % (06-06-25 @ 13:04)    Glucose: POCT Blood Glucose.: 88 mg/dL (02-27-25 @ 07:30)    BP: --Vital Signs Last 24 Hrs  T(C): 36.9 (06-10-25 @ 07:53), Max: 36.9 (06-10-25 @ 07:53)  T(F): 98.4 (06-10-25 @ 07:53), Max: 98.4 (06-10-25 @ 07:53)  HR: --  BP: --  BP(mean): --  RR: 18 (06-10-25 @ 07:53) (18 - 18)  SpO2: --    Orthostatic VS  06-10-25 @ 07:53  Lying BP: --/-- HR: --  Sitting BP: 109/68 HR: 85  Standing BP: 120/74 HR: 82  Site: --  Mode: --  Orthostatic VS  06-09-25 @ 08:11  Lying BP: --/-- HR: --  Sitting BP: 129/70 HR: 72  Standing BP: 113/71 HR: 87  Site: --  Mode: --    Lipid Panel: Date/Time: 02-27-25 @ 10:32  Cholesterol, Serum: 148  LDL Cholesterol Calculated: 86  HDL Cholesterol, Serum: 50  Total Cholesterol/HDL Ration Measurement: --  Triglycerides, Serum: 57  
BMI: BMI (kg/m2): 40.7 (06-06-25 @ 18:16)  HbA1c: A1C with Estimated Average Glucose Result: 5.2 % (06-06-25 @ 13:04)    Glucose: POCT Blood Glucose.: 88 mg/dL (02-27-25 @ 07:30)    BP: 131/75 (06-06-25 @ 13:59) (121/78 - 131/75)Vital Signs Last 24 Hrs  T(C): 36.9 (06-08-25 @ 08:18), Max: 36.9 (06-08-25 @ 08:18)  T(F): 98.4 (06-08-25 @ 08:18), Max: 98.4 (06-08-25 @ 08:18)  HR: --  BP: --  BP(mean): --  RR: 20 (06-08-25 @ 08:18) (20 - 20)  SpO2: --    Orthostatic VS  06-08-25 @ 08:18  Lying BP: --/-- HR: --  Sitting BP: 121/73 HR: 86  Standing BP: 113/79 HR: 97  Site: --  Mode: --  Orthostatic VS  06-07-25 @ 07:51  Lying BP: --/-- HR: --  Sitting BP: 105/69 HR: 77  Standing BP: 110/72 HR: 85  Site: --  Mode: --  Orthostatic VS  06-06-25 @ 18:16  Lying BP: --/-- HR: --  Sitting BP: 112/66 HR: 91  Standing BP: 126/68 HR: 100  Site: --  Mode: --    Lipid Panel: Date/Time: 02-27-25 @ 10:32  Cholesterol, Serum: 148  LDL Cholesterol Calculated: 86  HDL Cholesterol, Serum: 50  Total Cholesterol/HDL Ration Measurement: --  Triglycerides, Serum: 57  
BMI: BMI (kg/m2): 40.7 (06-06-25 @ 18:16)  HbA1c: A1C with Estimated Average Glucose Result: 5.2 % (06-06-25 @ 13:04)    Glucose: POCT Blood Glucose.: 88 mg/dL (02-27-25 @ 07:30)    BP: --Vital Signs Last 24 Hrs  T(C): 37.4 (06-12-25 @ 07:39), Max: 37.4 (06-12-25 @ 07:39)  T(F): 99.3 (06-12-25 @ 07:39), Max: 99.3 (06-12-25 @ 07:39)  HR: --  BP: --  BP(mean): --  RR: 20 (06-12-25 @ 07:39) (20 - 20)  SpO2: --    Orthostatic VS  06-12-25 @ 07:39  Lying BP: --/-- HR: --  Sitting BP: 117/78 HR: 90  Standing BP: 109/83 HR: 108  Site: --  Mode: --  Orthostatic VS  06-11-25 @ 07:38  Lying BP: --/-- HR: --  Sitting BP: 114/71 HR: 91  Standing BP: 100/62 HR: 93  Site: --  Mode: --    Lipid Panel: Date/Time: 02-27-25 @ 10:32  Cholesterol, Serum: 148  LDL Cholesterol Calculated: 86  HDL Cholesterol, Serum: 50  Total Cholesterol/HDL Ration Measurement: --  Triglycerides, Serum: 57

## 2025-06-13 NOTE — BH TREATMENT PLAN - NSTXPSYCHOINTERPR_PSY_ALL_CORE
Writer identified an apt goal for the patient defined in the  plan of care. The patient’s Psychiatric Rehabilitation goal is to identify thoughts and self-talk that contribute to depression for her depressive symptoms goal. Psychiatric Rehabilitation staff will meet with patient weekly to review progress towards identified goal.

## 2025-06-13 NOTE — BH INPATIENT PSYCHIATRY PROGRESS NOTE - PRN MEDS
MEDICATIONS  (PRN):  haloperidol     Tablet 5 milliGRAM(s) Oral every 6 hours PRN Mild-moderate agitation, secondary to psychosis, talha, or poor impulse control  haloperidol    Injectable 5 milliGRAM(s) IntraMuscular once PRN Severe agitation secondary to psychosis, talha, or poor impulse control.  LORazepam     Tablet 2 milliGRAM(s) Oral every 6 hours PRN Mild-moderate agitation secondary to psychosis, talha, or poor impulse, Mild-moderate anxiety  LORazepam   Injectable 2 milliGRAM(s) IntraMuscular once PRN Severe agitation secondary to psychosis, talha, or poor impulse control.  Severe anxiety.  

## 2025-06-14 RX ADMIN — ARIPIPRAZOLE 10 MILLIGRAM(S): 2 TABLET ORAL at 20:13

## 2025-06-14 NOTE — BH SAFETY PLAN - THE ONE THING THAT IS MOST IMPORTANT TO ME AND WORTH LIVING FOR IS:
Having a whole support system motivates me. Looking forward to finding a job, exercising, going to a day spa, getting my nails and hair done, and making new friends.

## 2025-06-14 NOTE — BH DISCHARGE NOTE NURSING/SOCIAL WORK/PSYCH REHAB - PATIENT PORTAL LINK FT
You can access the FollowMyHealth Patient Portal offered by [facility] by registering at the following website: [email or web address]. By joining [facility]’s FollowMyHealth portal, you will also be able to view your health information using other applications (apps) compatible with our system.

## 2025-06-14 NOTE — BH DISCHARGE NOTE NURSING/SOCIAL WORK/PSYCH REHAB - NSDCPRRECOMMEND_PSY_ALL_CORE
Upon discharge, it is recommended that patient continue to attend a structured and supportive intervention to sustain noted improvements.

## 2025-06-14 NOTE — BH DISCHARGE NOTE NURSING/SOCIAL WORK/PSYCH REHAB - FINANCIAL ASSISTANCE
[facility] provides services at a reduced cost to those who are determined to be eligible through [facility]’s financial assistance program. Information regarding [facility]’s financial assistance program can be found by going to [email or web address] or by calling [phone].

## 2025-06-14 NOTE — BH DISCHARGE NOTE NURSING/SOCIAL WORK/PSYCH REHAB - NSCDUDCCRISIS_PSY_A_CORE
[facility]  1 (928) [facility] hospital[facility] ([phone])  Text "WELL" to 42387  Website: www.[employer]/.National Suicide Prevention Lifeline 1 (693) 812-9825/.  [employer]  1 (619) LIFENET (554-0431)/.  [facility]  [address] [address] [phone]   Hours:  Monday through Friday from 9 AM to 3 PM/988 Suicide and Crisis Lifeline

## 2025-06-14 NOTE — BH DISCHARGE NOTE NURSING/SOCIAL WORK/PSYCH REHAB - DISCHARGE INSTRUCTIONS AFTERCARE APPOINTMENTS
In order to check the location, date, or time of your aftercare appointment, please refer to your Discharge Instructions Document given to you upon leaving the hospital.  If you have lost the instructions please call [phone]

## 2025-06-14 NOTE — BH DISCHARGE NOTE NURSING/SOCIAL WORK/PSYCH REHAB - NSDCPRGOAL_PSY_ALL_CORE
The patient met her specified goal to identify 2 coping skills that help mitigate hallucinations for her psychotic symptoms goal. Progress was evidenced by an improved ability to tolerate safety planning and engaging in appropriate and supportive dialogue with peers during communal engagement. The patient attended 90% of the Psychiatric Rehabilitation groups throughout her stay. The writer completed the patient’s safety plan during this session.

## 2025-06-15 RX ADMIN — ARIPIPRAZOLE 10 MILLIGRAM(S): 2 TABLET ORAL at 20:27

## 2025-06-16 VITALS — TEMPERATURE: 98 F | RESPIRATION RATE: 18 BRPM

## 2025-06-16 PROCEDURE — 90832 PSYTX W PT 30 MINUTES: CPT | Mod: 93

## 2025-06-16 PROCEDURE — 99238 HOSP IP/OBS DSCHRG MGMT 30/<: CPT

## 2025-06-16 RX ORDER — ARIPIPRAZOLE 2 MG/1
1 TABLET ORAL
Qty: 15 | Refills: 0
Start: 2025-06-16 | End: 2025-06-30

## 2025-06-16 NOTE — BH INPATIENT PSYCHIATRY DISCHARGE NOTE - ATTENDING DISCHARGE PHYSICAL EXAMINATION:
Care was discussed and reviewed in the interdisciplinary treatment team.  I, Mirian Tucker MD, have reviewed and verified the documentation.       Patient was seen and evaluated today by this writer. Patient reported that she is feeling much better and is looking forward to being able to leave the hospital. Patient is denying any SI and has been able to contract for safety. Denies any HI or hallucinations, and no delusions have been elicited. The patient has been educated about the safety plan and understands that if she feels like harming herself or others, she can call 911 or go to any ER. During the hospital stay, the patient has not demonstrated any aggressive or self-injurious behaviors, and this has been consistent with my observations and the observations of the staff.

## 2025-06-16 NOTE — BH INPATIENT PSYCHIATRY DISCHARGE NOTE - HOSPITAL COURSE
Dates of hospitalization: 6/6/25-6/16/25      On admission interview, patient presented with the following signs and symptoms:  brought in by her mother today for agitation this morning with increased symptoms of AH/VH, depression, anxiety.  Pt reports that her mom brought her here today because she got agitated and upset this morning. States she was on the phone with someone from the program who said things to her that were upsetting, telling her that she was a disappointment for not coming into the program today. Pt felt triggered by this and heard AH/VH teling her things like "you're useless, you don't do anything, you're a disappointment." Pt became upset and began hitting and kicking the refrigerator, yelling and screaming. Pt's mother and grandmother were in the house, pt reports she was not physically violent towards them. States she was able to calm down after a while, but pt and her mother felt that she would benefit from getting more help at the hospital.  At baseline, has chronic AH/VH where she sees "lots of angels" who look like "dark figures" and she hears their voices. Has AH/VH constantly everyday, they never went away even after hospitalizations. More recently, reports that voices have been getting more intense and saying things to her that are more upsetting such as commanding her to hurt other people, they do not directly command her to hurt herself but say things like "I don't care you die, you might as well just grab that knife." Pt states she does not intend to follow these commands and has no intention of hurting others or herself but that she feels increasingly upset and conflicted because the voices would not stop. Feels paranoid at times that the angels would harm her, states there is one rachael that she is scared would rape her. Also endorsing increased depressed mood and anxiety, has been having more days where she is "happy on the outside but crying on the inside." Has been getting about 4-5 hours of sleep per night, no changes in appetite. Currently, denies SI/SIB/HI. Denies thought blocking or insertion, denies ideas of reference, denies symptoms of talha. Denies substance use. Denies access to firearms.       Patient was continued on Abilify 10mg with good tolerability. Patient’s symptoms stabilized during hospitalization with groups and socialization. At time of discharge, patient ready to go home and seek further care as an outpatient (never endorsed current or past SI).      There were no behavioral problems on the unit.  Patient did not become agitated and did not require emergent intramuscular medications or seclusion/restraints.  Patient did not self-harm on the unit. Patient remained actively engaged in treatment. Patient participated in individual, group, and milieu therapy. Patient got along appropriately with staff and peers. Family was not contacted at time of admission to obtain collateral (no consents). Safety planning and disposition planning were performed.  Patient did not have any medical problems during this hospitalization.  There were no medical consultations.       On day of discharge, the patient has improved significantly and no longer requires inpatient treatment and care. Patient denies all suicidal and aggressive ideation, intent and plan. Patient displays no psychotic symptoms. Patient is not judged to be an acute danger to self or others at this time. She is stable for transition to outpatient level of care.         Risk Assessment: The patient is at chronic risk of harm to self and others given diagnosis of Schizophrenia in addition to current psychiatric hospitalization.      Protective factors include no access to firearms, Mother, stable housing, multiple treatment resources, patient engagement with treatment and desire to obtain treatment (even prior to hospitalization).      On admission the patient was felt to be at an acutely elevated risk of harm to self or others as they were suffering from emotional dysregulation, worsening AVH without abortive medication or pharmacotherapy established with the goal of treating/preventing them. Over the hospital course medications were started and patient was set up with after-care plans.     Although patient has an elevated chronic risk of harm to self or others, acute risk has been addressed during inpatient hospitalization. Further hospitalization is no longer warranted. Patient is ready for transition to outpatient care for further management.        Patient will be discharged with the following DSM5 diagnoses:   1. Schizophrenia	     Patient will be discharged on the following medications:   1. Abilify 10mg daily - 15 days 	  2. Haldol Decanoate IM - last given 5/28/25	           1.	Will d/c home on current regimen. 2. Psychoeducation provided regarding importance of compliance with outpatient appointments and medications. 3. Pt was advised to reduce substance use (MJ/alcohol). 4. Pt was advised to dial 911, return to the ER, or visit the [facility] if they become a danger to self or others or need urgent help.

## 2025-06-16 NOTE — BH INPATIENT PSYCHIATRY DISCHARGE NOTE - NSBHDCHANDOFFFT_PSY_ALL_CORE
Discharge summary emailed to TRISTEN AOAUDREY/MARILYN. Discussed treatment plan and meds included my contact information Margaret Garcias NP, [phone].

## 2025-06-16 NOTE — BH INPATIENT PSYCHIATRY DISCHARGE NOTE - NSBHMETABOLIC_PSY_ALL_CORE_FT
BMI: BMI (kg/m2): 40.7 (06-14-25 @ 07:41)  HbA1c: A1C with Estimated Average Glucose Result: 5.2 % (06-06-25 @ 13:04)    Glucose: POCT Blood Glucose.: 88 mg/dL (02-27-25 @ 07:30)    BP: --Vital Signs Last 24 Hrs  T(C): 36.4 (06-16-25 @ 07:35), Max: 36.4 (06-16-25 @ 07:35)  T(F): 97.5 (06-16-25 @ 07:35), Max: 97.5 (06-16-25 @ 07:35)  HR: --  BP: --  BP(mean): --  RR: 18 (06-16-25 @ 07:35) (18 - 18)  SpO2: --    Orthostatic VS  06-16-25 @ 07:35  Lying BP: --/-- HR: --  Sitting BP: 111/69 HR: 83  Standing BP: 113/70 HR: 90  Site: --  Mode: --  Orthostatic VS  06-15-25 @ 07:59  Lying BP: --/-- HR: --  Sitting BP: 115/70 HR: 82  Standing BP: 125/65 HR: 92  Site: --  Mode: --    Lipid Panel: Date/Time: 02-27-25 @ 10:32  Cholesterol, Serum: 148  LDL Cholesterol Calculated: 86  HDL Cholesterol, Serum: 50  Total Cholesterol/HDL Ration Measurement: --  Triglycerides, Serum: 57

## 2025-06-16 NOTE — BH INPATIENT PSYCHIATRY DISCHARGE NOTE - NSBHFUPINTERVALHXFT_PSY_A_CORE
Chart reviewed and case discussed in team meeting. Patient was calm, cooperative, and pleasant. Patient reported feeling better. Patient is denying all psychiatric symptoms, said that she is feeling better and is looking forward for discharge. Discussed discharge planned for Monday, she agreed. She denied SIIP, HIIP, AVH.      Patient was in agreement with continuing treatment and attending outpatient appointment. Educated patient about crisis center as a resource and calling 911 or going to the emergency room if there is a safety concern.  Chart reviewed and case discussed in team meeting. Patient was calm, cooperative, and pleasant. Patient reported feeling better. Patient is denying all psychiatric symptoms, said that she is feeling better. Patient is looking forward to going home. She feels safe to go home. Patient reported "good" AVH, hearing God.  She denied SIIP, HIIP, AVH.      Patient was in agreement with continuing treatment and attending outpatient appointment. Educated patient about crisis center as a resource and calling 911 or going to the emergency room if there is a safety concern.  Chart reviewed and case discussed in team meeting. Patient was calm, cooperative, and pleasant. Patient reported feeling better. Patient is denying all psychiatric symptoms, said that she is feeling better. Patient is looking forward to going home. She feels safe to go home. Patient reported "good" AVH, hearing God.  She denied SIIP, HIIP.      Patient was in agreement with continuing treatment and attending outpatient appointment. Educated patient about crisis center as a resource and calling 911 or going to the emergency room if there is a safety concern.

## 2025-06-16 NOTE — BH INPATIENT PSYCHIATRY DISCHARGE NOTE - NSDCMRMEDTOKEN_GEN_ALL_CORE_FT
ARIPiprazole 10 mg oral tablet: 1 tab(s) orally once a day  haloperidol decanoate 100 mg/mL intramuscular solution: 200 milligram(s) intramuscularly every 4 weeks Last received 5/28/25

## 2025-06-16 NOTE — BH INPATIENT PSYCHIATRY DISCHARGE NOTE - OTHER PAST PSYCHIATRIC HISTORY (INCLUDE DETAILS REGARDING ONSET, COURSE OF ILLNESS, INPATIENT/OUTPATIENT TREATMENT)
Pt is a 31 yo woman, on SSD, PPHx ASD w/ schizophrenia, at least 10 previous admissions- last at [facility] 2/27-3/6 , multiple ED presentations, no hx of suicide attempts, history of aggression in the past (hitting), no legal issues, no substance use. PMH of obesity, pre-diabetes who in treatment at [facility], last seen on 2/3/25, bib today by her mom for worsening of +ah, depression and anxiety. pt presented to the  ED due to increased feelings of depression and anxiety, as well as more frequent auditory hallucinations. She endorses chronic auditory hallucinations, describing the voices saying they are  "bigger and better" than her and making "vain" remarks.  She stated that over the past week, the voices have become louder, and her coping skills are no longer effective.  She attributes her increased depression and anxiety over the last few days to these intensified auditory hallucinations .In addition to auditory hallucinations, she reports chronic visual hallucinations of "dark spirits and angels."  She described having a demonic side that will have to kill the voices with either a knife or chainsaw, but clarified this would be done "spiritually," not with a physical weapon.  She denies command auditory hallucinations. The patient also reported poor sleep over the last few days, attributing it to the voices, and stated she is only sleeping about four hours a night.  Last week, she missed one day of her program due to feeling overwhelmed by the voices and was distressed by employee stating she should come to treatment. She reports crying more often because of the worsening auditory hallucinations. This AM she called [phone] because she was crying, felt overwhelmed by the voices and didn't know what to do. Patient adamantly denies SI, intent or plan; denies any HI, violent thoughts.     as per Ed colleteral, Dr. Crowe requesting call to ED- He reports patient was in respite recently in [place] x 1 week last month. She was feeling lonely and depressed/AH were bothersome. She has been struggling recently. She recently restarted individual therapy and asked for group therapy.  He stated patient occasionally will miss a dose of Abilify but is otherwise compliant. He is not sure what the precipitating factor is. He does not think there is secondary gain. He stated if patient wants admission that would be fine.      [facility] program [phone]    Spoke with Mother (see  note)- Per mother patient takes medication at night because it makes her drowsy in the AM. So she switched to QHS.  She misses medication 3x a week. Mother stated medication has to be crushed and mixed with applesauce. Mother stated she will get home 730p/8p and patient will already be sleeping and miss her dose of Abilify. Trigger may have been patient being bullied in her program by a peer- that peer was reported and was not longer in the program.

## 2025-06-16 NOTE — BH INPATIENT PSYCHIATRY DISCHARGE NOTE - REASON FOR ADMISSION
Patient was bib by her mom for worsening of +ah, depression and anxiety after argument with staff member.

## 2025-06-16 NOTE — BH INPATIENT PSYCHIATRY DISCHARGE NOTE - HPI (INCLUDE ILLNESS QUALITY, SEVERITY, DURATION, TIMING, CONTEXT, MODIFYING FACTORS, ASSOCIATED SIGNS AND SYMPTOMS)
As per ER Behavioral Health Assessment Note filed at [facility] on 6/6/2025: "Pt is a 29 yo woman, on SSD, PPHx ASD w/ schizophrenia, at least 10 previous admissions- last at [facility] 2/27-3/6/25, multiple ED presentations, no hx of suicide attempts, history of aggression in the past (hitting), no legal issues, no substance use. PMH of obesity, pre-diabetes, in [facility] and in outpatient treatment at [facility], brought in by her mother today for agitation this morning with increased symptoms of AH/VH, depression, anxiety.  Pt reports that her mom brought her here today because she got agitated and upset this morning. States she was on the phone with someone from the program who said things to her that were upsetting, telling her that she was a disappointment for not coming into the program today. Pt felt triggered by this and heard AH/VH teling her things like "you're useless, you don't do anything, you're a disappointment." Pt became upset and began hitting and kicking the refrigerator, yelling and screaming. Pt's mother and grandmother were in the house, pt reports she was not physically violent towards them. States she was able to calm down after a while, but pt and her mother felt that she would benefit from getting more help at the hospital.  At baseline, has chronic AH/VH where she sees "lots of angels" who look like "dark figures" and she hears their voices. Has AH/VH constantly everyday, they never went away even after hospitalizations. More recently, reports that voices have been getting more intense and saying things to her that are more upsetting such as commanding her to hurt other people, they do not directly command her to hurt herself but say things like "I don't care you die, you might as well just grab that knife." Pt states she does not intend to follow these commands and has no intention of hurting others or herself but that she feels increasingly upset and conflicted because the voices would not stop. Feels paranoid at times that the angels would harm her, states there is one rachael that she is scared would rape her. Also endorsing increased depressed mood and anxiety, has been having more days where she is "happy on the outside but crying on the inside." Has been getting about 4-5 hours of sleep per night, no changes in appetite. Currently, denies SI/SIB/HI. Denies thought blocking or insertion, denies ideas of reference, denies symptoms of talha. Denies substance use. Denies access to firearms.    Collateral from mother (Priyanka Galeas [phone]):  Mom states that pt was supposed to go to the program this AM but stated she had sharp pains in her stomach. She was also endorsing that she wasn't feeling well mentally and saying that she wanted some help, so they called [phone] together and received guidance that they should maybe go to the hospital. Pt called her program to let them know that she won't be coming in that day, but spoke to a particular staff member who seemed to be telling her upsetting things about how many patients she has to see and how much workload the patient is adding by being inconsistent with her program. This seemed to trigger the patient and she subsequently became flustered and upset, started stomping on the floor, hitting a chair, threw her chair towards the wall, stating that she wants to hurt the person who she spoke to on the phone. Mom states that the pt never attempted to her hurt or her grandmother and that she directed her anger only towards the furniture. Mom says pt has been under increased stress lately: has been participating in dancing/acting program that has been demanding a lot of her time and responsibilities, coming home extremely tired, pt continued to pour a lot of energy into this saying she didn't want to disappoint anyone. Pt also graduated from the [id number] a couple weeks ago. Feels pt's VH/AH has been getting worse. The voices telling her to hurt other people are more unusual and recent. She had voices telling her to hurt herself a few years ago, but not any recently. Also feels that her depression and anxiety are worse, mom has been noticing that she's been having a harder time getting up in the morning and doing things. Has been getting 4-5 hours sleep. She missed her abilify once or twice but has been mostly compliant. Received Haldol ZHAO last week 5/28. Has been attending [facility] 3x/week."    On 2W interview, pt states she got upset after speaking to staff member at her program.  She states she has a tendency to overreact to things and states she should have found a better way to cope, like calling [employer], instead of getting angry.  Pt states at the time, her chronic voices got worse.  She states she is feeling better now, and the voices have gone back to usual and they are manageable.  Pt denies any thoughts of harming self or others now.  Pt states that she has [facility] three days a week, her dance program, and her clubhouse, but she states she needs something to do on the weekends (states she might want to join some sports) and also on Mondays, states that she feels this would help her remain calm and distracted.  Pt denies any new medical problems or allergies.  She also denies any recent ETOH or recreational drug use.